# Patient Record
Sex: FEMALE | Race: WHITE | Employment: OTHER | RURAL
[De-identification: names, ages, dates, MRNs, and addresses within clinical notes are randomized per-mention and may not be internally consistent; named-entity substitution may affect disease eponyms.]

---

## 2017-01-06 ENCOUNTER — OFFICE VISIT (OUTPATIENT)
Dept: FAMILY MEDICINE CLINIC | Age: 82
End: 2017-01-06

## 2017-01-06 VITALS
BODY MASS INDEX: 21.38 KG/M2 | SYSTOLIC BLOOD PRESSURE: 143 MMHG | OXYGEN SATURATION: 96 % | DIASTOLIC BLOOD PRESSURE: 98 MMHG | HEIGHT: 66 IN | HEART RATE: 49 BPM | WEIGHT: 133 LBS

## 2017-01-06 DIAGNOSIS — I10 ESSENTIAL HYPERTENSION: ICD-10-CM

## 2017-01-06 DIAGNOSIS — E83.52 HYPERCALCEMIA: ICD-10-CM

## 2017-01-06 DIAGNOSIS — E05.20 TOXIC MULTINODULAR GOITER: Primary | ICD-10-CM

## 2017-01-06 DIAGNOSIS — E21.3 HYPERPARATHYROIDISM (HCC): ICD-10-CM

## 2017-01-06 PROBLEM — Z90.12 S/P LEFT MASTECTOMY: Status: ACTIVE | Noted: 2017-01-06

## 2017-01-06 RX ORDER — GLUCOSAMINE SULFATE 1500 MG
POWDER IN PACKET (EA) ORAL DAILY
COMMUNITY
End: 2017-05-12

## 2017-01-06 RX ORDER — DOCUSATE SODIUM 100 MG/1
100 CAPSULE, LIQUID FILLED ORAL
COMMUNITY
End: 2019-04-25 | Stop reason: ALTCHOICE

## 2017-01-06 RX ORDER — POTASSIUM CHLORIDE 750 MG/1
TABLET, FILM COATED, EXTENDED RELEASE ORAL
Qty: 180 TAB | Refills: 3 | Status: SHIPPED | OUTPATIENT
Start: 2017-01-06 | End: 2017-02-18 | Stop reason: SDUPTHER

## 2017-01-06 NOTE — PATIENT INSTRUCTIONS
Hyperthyroidism: Care Instructions  Your Care Instructions  Hyperthyroidism occurs when the thyroid gland makes too much thyroid hormone. This speeds up your metabolismhow your body uses energy. This condition can cause you to be very active, lose weight, and have sleep problems, eye problems, and a fast heart rate. It can also cause a goiter. A goiter is an enlarged thyroid gland that you can see at the front of the neck. Hyperthyroidism is often caused by Graves disease. In Graves' disease, the body's defense (immune) system attacks the thyroid gland. Your doctor may prescribe a beta-blocker medicine to slow your pulse and calm you down. But this is not a treatment for hyperthyroidism. It is given for your fast heart rate. Your doctor may also give you antithyroid medicine. This medicine keeps excess thyroid hormone in check. In some cases, doctors recommend radioactive iodine or surgery to remove the thyroid. After either of these treatments, you may need to take medicine to replace thyroid hormone for the rest of your life. Follow-up care is a key part of your treatment and safety. Be sure to make and go to all appointments, and call your doctor if you are having problems. Its also a good idea to know your test results and keep a list of the medicines you take. How can you care for yourself at home? · Take your medicines exactly as prescribed. You need to take the thyroid medicine at the same time each day. Call your doctor if you think you are having a problem with your medicine. · Graves' disease can make your eyes sore. Use artificial tears, eye drops, and sunglasses to protect your eyes from dryness, wind, and sun. Raise your head with pillows at night to prevent your eyes from swelling. In some cases, taping your eyelids shut at night will keep your eyes from being dry in the morning. · Make sure you get enough calcium.  Foods that are rich in calcium include milk, yogurt, cheese, and dark green vegetables. · If you need to gain weight, ask your doctor about special diets. · Do not eat kelp. Evelyne Barney is high in iodine, which can make hyperthyroidism worse. Evelyne Barney is commonly used in [employer] and other [place] foods. You can use iodized salt and eat bread and seafood. Try to eat a balanced diet. · Do not use caffeine and other stimulants. These can make symptoms worse, such as a fast heartbeat, nervousness, and problems focusing. · Do not smoke. Smoking can make your condition worse and may lead to more serious eye problems. If you need help quitting, talk to your doctor about stop-smoking programs and medicines. These can increase your chances of quitting for good. · Lower your stress. Learn to use biofeedback, guided imagery, meditation, or other methods to relax. · Use creams or ointments for irritated skin. Ask your doctor which type to use. · Tell all your doctors about your condition. They need to know because some medicines contain iodine. When should you call for help? Call your doctor now or seek immediate medical care if:  · You have symptoms of a sudden, very high thyroid level (thyroid storm). These include:  ¨ Being nauseated, vomiting, and having diarrhea. ¨ Sweating a lot. ¨ Feeling extremely restless and confused. ¨ Having a high fever. ¨ Having a fast heartbeat. · You have sudden vision changes or eye pain. · You have a fever or severe sore throat and are taking antithyroid medicines, such as PTU or methimazole. Watch closely for changes in your health, and be sure to contact your doctor if:  · You have a sore throat or have problems swallowing. · You have swollen, itchy, or red eyes or your other eye symptoms get worse, or you have new vision problems. · You have signs of a low thyroid level (hypothyroidism). You may feel very tired, confused, or weak. Where can you learn more? Go to http://frandy-josé miguel.info/.   Enter R657 in the search box to learn more about \"Hyperthyroidism: Care Instructions. \"  Current as of: July 28, 2016  Content Version: 11.1  © 8536-0885 [employer]. Care instructions adapted under license by [employer] (which disclaims liability or warranty for this information). If you have questions about a medical condition or this instruction, always ask your healthcare professional. [place] [address] any warranty or liability for your use of this information.

## 2017-01-06 NOTE — PROGRESS NOTES
Oakley Homans is a 80 y.o. female presenting for/with:    Hypertension    HPI  Grief reaction  Pt had recent loss of her  early Oct 2016 from a brief episode of cancer. Feelings of being down have been mild lately. Tremor has been improving. PHQ 2 / 9, over the last two weeks 1/6/2017   Little interest or pleasure in doing things Not at all   Feeling down, depressed or hopeless Not at all   Total Score PHQ 2 0   Trouble falling or staying asleep, or sleeping too much -   Feeling tired or having little energy -   Poor appetite or overeating -   Feeling bad about yourself - or that you are a failure or have let yourself or your family down -   Trouble concentrating on things such as school, work, reading or watching TV -   Moving or speaking so slowly that other people could have noticed; or the opposite being so fidgety that others notice -   Thoughts of being better off dead, or hurting yourself in some way -   PHQ 9 Score -     Pre-Diabetes. Sugars controlled well  Hypoglycemia: none  Tolerating current treatment well  Current medications include diet only. Never on meds for DM2. Lab Results   Component Value Date/Time    Hemoglobin A1c 5.7 04/08/2016 12:23 PM    Hemoglobin A1c 6.7 10/21/2015 10:15 AM    Hemoglobin A1c 6.6 06/04/2015 12:10 PM    Glucose 88 10/26/2016 12:13 PM    LDL, calculated 76 10/21/2015 10:15 AM    Creatinine 0.81 10/26/2016 12:13 PM     Lab Results   Component Value Date/Time    Microalbumin, urine 114.3 12/22/2015 12:21 PM     Follow- up for abn thyroid levels and abn PTH  Multinodular toxic goiter. Last labs show normal parathyroid but con't overactive thyroid. We added methimazole 5mg every day last visit and con't bisoprolol at 10mg every day. Still having some tremor. Less issues with  racing thoughts.     Lab Results   Component Value Date/Time    TSH 0.057 10/26/2016 12:13 PM    TSH 0.064 06/16/2016 12:57 PM    T3 Uptake 26 04/11/2016 05:42 PM    T4, Free 1.35 06/16/2016 12:57 PM    T4, Total 11.6 04/11/2016 05:42 PM     Lab Results   Component Value Date/Time    Calcium 11.4 10/26/2016 12:13 PM    PTH, Intact 36 10/26/2016 12:13 PM     Hypertension. Blood pressures have been stable. Management at last visit included con't bisoprolol 10mg qd. Current regimen: beta-blocker (due to hx of hyperthyroid). Also taking and magnesium, potassium. Prev on loop diuretic, has been held lately. Symptoms include no symptoms. Patient denies palpitations, edema. Lab review:   Lab Results   Component Value Date/Time    Sodium 142 10/26/2016 12:13 PM    Potassium 4.6 10/26/2016 12:13 PM    Chloride 102 10/26/2016 12:13 PM    CO2 24 10/26/2016 12:13 PM    Glucose 88 10/26/2016 12:13 PM    BUN 18 10/26/2016 12:13 PM    Creatinine 0.81 10/26/2016 12:13 PM    BUN/Creatinine ratio 22 10/26/2016 12:13 PM    GFR est AA 78 10/26/2016 12:13 PM    GFR est non-AA 67 10/26/2016 12:13 PM    Calcium 11.4 10/26/2016 12:13 PM     PMH, SH, Medications/Allergies: reviewed, on chart. Pt's  d/c from cancer late Oct 2016.     ROS:  Constitutional: No fever, chills or weight loss  Respiratory: No cough, SOB   CV: No recent chest pain, No Palpitations    Visit Vitals    BP (!) 143/98 (BP 1 Location: Right arm, BP Patient Position: At rest)    Pulse (!) 49    Ht 5' 6\" (1.676 m)    Wt 133 lb (60.3 kg)    SpO2 96%    BMI 21.47 kg/m2     Wt Readings from Last 3 Encounters:   01/06/17 133 lb (60.3 kg)   12/02/16 133 lb 3.2 oz (60.4 kg)   11/11/16 130 lb (59 kg)     BP Readings from Last 3 Encounters:   01/06/17 (!) 143/98   12/02/16 142/65   11/11/16 140/64     Physical Examination: General appearance - alert, well appearing, and in no distress  Mental status - alert, oriented to person, place, and time  Eyes - pupils equal and reactive, extraocular eye movements intact  ENT - bilateral external ears and nose normal. Normal lips  Neck - supple, no significant adenopathy, no thyromegaly or mass  Lymphatics - no palpable lymphadenopathy, no hepatosplenomegaly  Chest - clear to auscultation, no wheezes, rales or rhonchi, symmetric air entry  Heart - normal rate, regular rhythm, normal S1, S2, no murmurs, rubs, clicks or gallops  Extremities - peripheral pulses normal, no pedal edema, no clubbing or cyanosis    A/P:  Multinodular, mildly toxic goiter. Doing well on bisoprolol 10mg every day, DURANT 5mg every day. recheck labs today. Movement disorder/tremor  Better s/p starting DURANT. Probably due to uncontrolled hyperthyroid. Discussed thyroidectomy. PT is considering. Will think about surgical eval in next few visits. Grief reaction  Doing ok since last visit. Monitor. Osteoporosis. Fixing thyroid should help, but given fx risk, pt does meet criteria for 2y course of bisphosphonate or other agent. Plan DXA 2018    HTN  Decent control. con't current regimen. RF KCL 40meq /d. BrCA s/p L mastectomy  Mastectomy bra x2 and prosthesis ore  F/U 1mo or per labs.

## 2017-01-06 NOTE — MR AVS SNAPSHOT
Visit Information Date & Time Provider Department Dept. Phone Encounter #  
 1/6/2017 11:30 AM Hermelinda Kim, Jared Schultz [id number] Follow-up Instructions Return in about 4 weeks (around 2/3/2017). Upcoming Health Maintenance Date Due DTaP/Tdap/Td series (1 - Tdap) 6/2/2008 MEDICARE YEARLY EXAM 12/22/2016 GLAUCOMA SCREENING Q2Y 2/3/2018 Allergies as of 1/6/2017  Review Complete On: 1/6/2017 By: Hermelinda Kim MD  
  
 Severity Noted Reaction Type Reactions Contrast Agent [Iodine]  03/02/2015    Unknown (comments) Doxycycline  03/02/2015    Unknown (comments) Erythromycin  03/02/2015    Unknown (comments) Fosamax [Alendronate]  01/11/2016    Other (comments) Macrodantin [Nitrofurantoin Macrocrystalline]  03/02/2015    Unknown (comments) Sulfa (Sulfonamide Antibiotics)  03/02/2015    Hives Eyes turned red Current Immunizations  Reviewed on 10/26/2015 Name Date Influenza High Dose Vaccine PF 9/2/2016, 10/1/2015 Influenza Vaccine 10/17/2014 Pneumococcal Conjugate (PCV-13) 9/2/2016 Pneumococcal Vaccine (Unspecified Type) 10/1/2009 Td 6/1/2008 Zoster Vaccine, Live 6/1/2007 Not reviewed this visit You Were Diagnosed With   
  
 Codes Comments Toxic multinodular goiter    -  Primary ICD-10-CM: E05.20 ICD-9-CM: 242.20 Hyperparathyroidism ([facility]     ICD-10-CM: E21.3 ICD-9-CM: 252.00 Hypercalcemia     ICD-10-CM: T76.96 
ICD-9-CM: 275.42 Essential hypertension     ICD-10-CM: I10 
ICD-9-CM: 401.9 Vitals BP Pulse Height(growth percentile) Weight(growth percentile) SpO2 BMI  
 (!) 143/98 (BP 1 Location: Right arm, BP Patient Position: At rest) (!) 49 5' 6\" (1.676 m) 133 lb (60.3 kg) 96% 21.47 kg/m2 OB Status Smoking Status Hysterectomy Never Smoker BMI and BSA Data  Body Mass Index Body Surface Area  
 21.47 kg/m 2 1.68 m 2  
  
 Preferred Pharmacy Pharmacy Name Phone [facility] [facility] [address] Carry [phone] Your Updated Medication List  
  
   
This list is accurate as of: 1/6/17 12:45 PM.  Always use your most recent med list.  
  
  
  
  
 aspirin delayed-release 81 mg tablet Take  by mouth daily. bisoprolol 10 mg tablet Commonly known as:  Mary  Take 1 Tab by mouth daily. Indications: thyroid, pressure, heart  
  
 glucosamine-chondroitin 750-600 mg Tab Take  by mouth. methIMAzole 5 mg tablet Commonly known as:  TAPAZOLE  
TAKE 1 TABLET BY MOUTH DAILY  
  
 omeprazole 40 mg capsule Commonly known as:  PRILOSEC Take 1 Cap by mouth daily. potassium chloride SR 10 mEq tablet Commonly known as:  KLOR-CON 10  
TAKE 2 TABLETS BY MOUTH TWICE DAILY  
  
 STOOL SOFTENER 100 mg capsule Generic drug:  docusate sodium Take 100 mg by mouth two (2) times a day. VITAMIN B-12 1,000 mcg tablet Generic drug:  cyanocobalamin Take 1,000 mcg by mouth daily. VITAMIN D3 1,000 unit Cap Generic drug:  cholecalciferol Take  by mouth daily. Prescriptions Sent to Pharmacy Refills  
 potassium chloride SR (KLOR-CON 10) 10 mEq tablet 3 Sig: TAKE 2 TABLETS BY MOUTH TWICE DAILY Class: Normal  
 Pharmacy: [facility] [place] [address], [address] [place] [address] [address] Ph #: [phone] We Performed the Following CBC WITH AUTOMATED DIFF [39260 CPT(R)] COLLECTION VENOUS BLOOD,VENIPUNCTURE [id number] CPT(R)] METABOLIC PANEL, COMPREHENSIVE [08296 CPT(R)] TSH RFX ON ABNORMAL TO FREE T4 [[id number] Custom] Follow-up Instructions Return in about 4 weeks (around 2/3/2017). Patient Instructions Hyperthyroidism: Care Instructions Your Care Instructions Hyperthyroidism occurs when the thyroid gland makes too much thyroid hormone. This speeds up your metabolismhow your body uses energy. This condition can cause you to be very active, lose weight, and have sleep problems, eye problems, and a fast heart rate. It can also cause a goiter. A goiter is an enlarged thyroid gland that you can see at the front of the neck. Hyperthyroidism is often caused by Graves disease. In Graves' disease, the body's defense (immune) system attacks the thyroid gland. Your doctor may prescribe a beta-blocker medicine to slow your pulse and calm you down. But this is not a treatment for hyperthyroidism. It is given for your fast heart rate. Your doctor may also give you antithyroid medicine. This medicine keeps excess thyroid hormone in check. In some cases, doctors recommend radioactive iodine or surgery to remove the thyroid. After either of these treatments, you may need to take medicine to replace thyroid hormone for the rest of your life. Follow-up care is a key part of your treatment and safety. Be sure to make and go to all appointments, and call your doctor if you are having problems. Its also a good idea to know your test results and keep a list of the medicines you take. How can you care for yourself at home? · Take your medicines exactly as prescribed. You need to take the thyroid medicine at the same time each day. Call your doctor if you think you are having a problem with your medicine. · Graves' disease can make your eyes sore. Use artificial tears, eye drops, and sunglasses to protect your eyes from dryness, wind, and sun. Raise your head with pillows at night to prevent your eyes from swelling. In some cases, taping your eyelids shut at night will keep your eyes from being dry in the morning. · Make sure you get enough calcium. Foods that are rich in calcium include milk, yogurt, cheese, and dark green vegetables. · If you need to gain weight, ask your doctor about special diets. · Do not eat kelp. Mary Ellen Nolen is high in iodine, which can make hyperthyroidism worse. Mary Ellen Pinedas is commonly used in sushi and other [place] foods. You can use iodized salt and eat bread and seafood. Try to eat a balanced diet. · Do not use caffeine and other stimulants. These can make symptoms worse, such as a fast heartbeat, nervousness, and problems focusing. · Do not smoke. Smoking can make your condition worse and may lead to more serious eye problems. If you need help quitting, talk to your doctor about stop-smoking programs and medicines. These can increase your chances of quitting for good. · Lower your stress. Learn to use biofeedback, guided imagery, meditation, or other methods to relax. · Use creams or ointments for irritated skin. Ask your doctor which type to use. · Tell all your doctors about your condition. They need to know because some medicines contain iodine. When should you call for help? Call your doctor now or seek immediate medical care if: 
· You have symptoms of a sudden, very high thyroid level (thyroid storm). These include: ¨ Being nauseated, vomiting, and having diarrhea. ¨ Sweating a lot. ¨ Feeling extremely restless and confused. ¨ Having a high fever. ¨ Having a fast heartbeat. · You have sudden vision changes or eye pain. · You have a fever or severe sore throat and are taking antithyroid medicines, such as PTU or methimazole. Watch closely for changes in your health, and be sure to contact your doctor if: 
· You have a sore throat or have problems swallowing. · You have swollen, itchy, or red eyes or your other eye symptoms get worse, or you have new vision problems. · You have signs of a low thyroid level (hypothyroidism). You may feel very tired, confused, or weak. Where can you learn more? Go to http://frandy-josé miguel.info/. Enter A704 in the search box to learn more about \"Hyperthyroidism: Care Instructions. \" Current as of: July 28, 2016 Content Version: 11.1 © 1034-1202 [employer]. Care instructions adapted under license by [employer] (which disclaims liability or warranty for this information). If you have questions about a medical condition or this instruction, always ask your healthcare professional. [address] any warranty or liability for your use of this information. Introducing [facility]! Dear Maxwell Sibley: 
Thank you for requesting a [employer] account. Our records indicate that you already have an active [employer] account. You can access your account anytime at [email or web address] Did you know that you can access your hospital and ER discharge instructions at any time in [employer]? You can also review all of your test results from your hospital stay or ER visit. Additional Information If you have questions, please visit the Frequently Asked Questions section of the [employer] website at [email or web address] Remember, [employer] is NOT to be used for urgent needs. For medical emergencies, dial 911. Now available from your iPhone and Android! Please provide this summary of care documentation to your next provider. Your primary care clinician is listed as Laurian Goodell K. Bavuso. If you have any questions after today's visit, please call [phone].

## 2017-01-07 LAB
ALBUMIN SERPL-MCNC: 4.1 G/DL (ref 3.5–4.7)
ALBUMIN/GLOB SERPL: 1.5 {RATIO} (ref 1.1–2.5)
ALP SERPL-CCNC: 74 IU/L (ref 39–117)
ALT SERPL-CCNC: 8 IU/L (ref 0–32)
AST SERPL-CCNC: 14 IU/L (ref 0–40)
BASOPHILS # BLD AUTO: 0.1 X10E3/UL (ref 0–0.2)
BASOPHILS NFR BLD AUTO: 1 %
BILIRUB SERPL-MCNC: 1.4 MG/DL (ref 0–1.2)
BUN SERPL-MCNC: 22 MG/DL (ref 8–27)
BUN/CREAT SERPL: 23 (ref 11–26)
CALCIUM SERPL-MCNC: 11.5 MG/DL (ref 8.7–10.3)
CHLORIDE SERPL-SCNC: 104 MMOL/L (ref 96–106)
CO2 SERPL-SCNC: 26 MMOL/L (ref 18–29)
CREAT SERPL-MCNC: 0.94 MG/DL (ref 0.57–1)
EOSINOPHIL # BLD AUTO: 0.3 X10E3/UL (ref 0–0.4)
EOSINOPHIL NFR BLD AUTO: 6 %
ERYTHROCYTE [DISTWIDTH] IN BLOOD BY AUTOMATED COUNT: 14.4 % (ref 12.3–15.4)
GLOBULIN SER CALC-MCNC: 2.7 G/DL (ref 1.5–4.5)
GLUCOSE SERPL-MCNC: 104 MG/DL (ref 65–99)
HCT VFR BLD AUTO: 41.9 % (ref 34–46.6)
HGB BLD-MCNC: 14.1 G/DL (ref 11.1–15.9)
IMM GRANULOCYTES # BLD: 0.1 X10E3/UL (ref 0–0.1)
IMM GRANULOCYTES NFR BLD: 1 %
LYMPHOCYTES # BLD AUTO: 1.8 X10E3/UL (ref 0.7–3.1)
LYMPHOCYTES NFR BLD AUTO: 40 %
MCH RBC QN AUTO: 28.7 PG (ref 26.6–33)
MCHC RBC AUTO-ENTMCNC: 33.7 G/DL (ref 31.5–35.7)
MCV RBC AUTO: 85 FL (ref 79–97)
MONOCYTES # BLD AUTO: 0.4 X10E3/UL (ref 0.1–0.9)
MONOCYTES NFR BLD AUTO: 9 %
NEUTROPHILS # BLD AUTO: 2 X10E3/UL (ref 1.4–7)
NEUTROPHILS NFR BLD AUTO: 43 %
PLATELET # BLD AUTO: 244 X10E3/UL (ref 150–379)
POTASSIUM SERPL-SCNC: 4.9 MMOL/L (ref 3.5–5.2)
PROT SERPL-MCNC: 6.8 G/DL (ref 6–8.5)
RBC # BLD AUTO: 4.91 X10E6/UL (ref 3.77–5.28)
SODIUM SERPL-SCNC: 143 MMOL/L (ref 134–144)
TSH SERPL DL<=0.005 MIU/L-ACNC: 0.68 UIU/ML (ref 0.45–4.5)
WBC # BLD AUTO: 4.5 X10E3/UL (ref 3.4–10.8)

## 2017-01-16 ENCOUNTER — TELEPHONE (OUTPATIENT)
Dept: FAMILY MEDICINE CLINIC | Age: 82
End: 2017-01-16

## 2017-01-16 NOTE — TELEPHONE ENCOUNTER
Pt called having problems with her eye (watery and red) after starting tapazole.  advised pt to stop and bring it in on the next to discuss with the

## 2017-01-16 NOTE — TELEPHONE ENCOUNTER
Leanne would like to talk to Sigrid Laboy in regards to an eye problem she thinks is related to a new medication. Please give her a call back. Thank you.

## 2017-02-03 ENCOUNTER — OFFICE VISIT (OUTPATIENT)
Dept: FAMILY MEDICINE CLINIC | Age: 82
End: 2017-02-03

## 2017-02-03 VITALS
WEIGHT: 135 LBS | HEART RATE: 73 BPM | BODY MASS INDEX: 21.69 KG/M2 | DIASTOLIC BLOOD PRESSURE: 78 MMHG | TEMPERATURE: 97.8 F | HEIGHT: 66 IN | OXYGEN SATURATION: 98 % | SYSTOLIC BLOOD PRESSURE: 162 MMHG | RESPIRATION RATE: 16 BRPM

## 2017-02-03 DIAGNOSIS — C50.911 BILATERAL MALIGNANT NEOPLASM OF BREAST IN FEMALE, UNSPECIFIED SITE OF BREAST: ICD-10-CM

## 2017-02-03 DIAGNOSIS — Z13.31 SCREENING FOR DEPRESSION: ICD-10-CM

## 2017-02-03 DIAGNOSIS — Z13.39 SCREENING FOR ALCOHOLISM: ICD-10-CM

## 2017-02-03 DIAGNOSIS — I10 ESSENTIAL HYPERTENSION: ICD-10-CM

## 2017-02-03 DIAGNOSIS — Z79.899 ENCOUNTER FOR LONG-TERM (CURRENT) DRUG USE: ICD-10-CM

## 2017-02-03 DIAGNOSIS — Z00.00 ROUTINE GENERAL MEDICAL EXAMINATION AT A HEALTH CARE FACILITY: Primary | ICD-10-CM

## 2017-02-03 DIAGNOSIS — C50.912 BILATERAL MALIGNANT NEOPLASM OF BREAST IN FEMALE, UNSPECIFIED SITE OF BREAST: ICD-10-CM

## 2017-02-03 DIAGNOSIS — E05.20 TOXIC MULTINODULAR GOITER: ICD-10-CM

## 2017-02-03 NOTE — PROGRESS NOTES
Leanne Bhatt is a 80 y.o. female presenting for/with: Annual Wellness Visit and Advance Care Planning    HPI  Grief reaction  Pt had recent loss of her  early Oct 2016 from a brief episode of cancer. Feelings of being down have been mild lately. Tremor has been improving. PHQ 2 / 9, over the last two weeks 2/3/2017   Little interest or pleasure in doing things Not at all   Feeling down, depressed or hopeless Not at all   Total Score PHQ 2 0   Trouble falling or staying asleep, or sleeping too much -   Feeling tired or having little energy -   Poor appetite or overeating -   Feeling bad about yourself - or that you are a failure or have let yourself or your family down -   Trouble concentrating on things such as school, work, reading or watching TV -   Moving or speaking so slowly that other people could have noticed; or the opposite being so fidgety that others notice -   Thoughts of being better off dead, or hurting yourself in some way -   PHQ 9 Score -     Pre-Diabetes. Sugars controlled well  Hypoglycemia: none  Tolerating current treatment well  Current medications include diet only. Never on meds for DM2. Lab Results   Component Value Date/Time    Hemoglobin A1c 5.7 04/08/2016 12:23 PM    Hemoglobin A1c 6.7 10/21/2015 10:15 AM    Hemoglobin A1c 6.6 06/04/2015 12:10 PM    Glucose 104 01/06/2017 12:42 PM    LDL, calculated 76 10/21/2015 10:15 AM    Creatinine 0.94 01/06/2017 12:42 PM     Lab Results   Component Value Date/Time    Microalbumin, urine 114.3 12/22/2015 12:21 PM     Follow- up for abn thyroid levels and abn PTH  Multinodular toxic goiter. Last labs show normal parathyroid and improved overactive thyroid. We con't methimazole 5mg daily last visit, but pt noticed some itching rash 2wk ago and we d/c that. Sx resolved after d/c med. Con't bisoprolol at 10mg every day. Still having some tremor. Less issues with  racing thoughts.     Lab Results   Component Value Date/Time    TSH 0.684 01/06/2017 12:42 PM    TSH 0.064 06/16/2016 12:57 PM    T3 Uptake 26 04/11/2016 05:42 PM    T4, Free 1.35 06/16/2016 12:57 PM    T4, Total 11.6 04/11/2016 05:42 PM     Lab Results   Component Value Date/Time    Calcium 11.5 01/06/2017 12:42 PM    PTH, Intact 36 10/26/2016 12:13 PM     Hypertension. Blood pressures have been stable. Management at last visit included con't bisoprolol 10mg qd. Current regimen: beta-blocker (due to hx of hyperthyroid). Also taking and magnesium, potassium. Prev on loop diuretic, has been held lately. Symptoms include no symptoms. Patient denies palpitations, edema. Lab review:   Lab Results   Component Value Date/Time    Sodium 143 01/06/2017 12:42 PM    Potassium 4.9 01/06/2017 12:42 PM    Chloride 104 01/06/2017 12:42 PM    CO2 26 01/06/2017 12:42 PM    Glucose 104 01/06/2017 12:42 PM    BUN 22 01/06/2017 12:42 PM    Creatinine 0.94 01/06/2017 12:42 PM    BUN/Creatinine ratio 23 01/06/2017 12:42 PM    GFR est AA 65 01/06/2017 12:42 PM    GFR est non-AA 56 01/06/2017 12:42 PM    Calcium 11.5 01/06/2017 12:42 PM     PMH, SH, Medications/Allergies: reviewed, on chart. Pt's  d/c from cancer late Oct 2016.     ROS:  Constitutional: No fever, chills or weight loss  Respiratory: No cough, SOB   CV: No recent chest pain, No Palpitations    Visit Vitals    /78    Pulse 73    Temp 97.8 °F (36.6 °C) (Oral)    Resp 16    Ht 5' 6\" (1.676 m)    Wt 135 lb (61.2 kg)    SpO2 98%    BMI 21.79 kg/m2     Wt Readings from Last 3 Encounters:   02/03/17 135 lb (61.2 kg)   01/06/17 133 lb (60.3 kg)   12/02/16 133 lb 3.2 oz (60.4 kg)     BP Readings from Last 3 Encounters:   02/03/17 162/78   01/06/17 (!) 143/98   12/02/16 142/65     Physical Examination: General appearance - alert, well appearing, and in no distress  Mental status - alert, oriented to person, place, and time  Eyes - pupils equal and reactive, extraocular eye movements intact  ENT - bilateral external ears and nose normal. Normal lips  Neck - supple, no significant adenopathy, no thyromegaly or mass  Lymphatics - no palpable lymphadenopathy, no hepatosplenomegaly  Chest - clear to auscultation, no wheezes, rales or rhonchi, symmetric air entry  Heart - normal rate, regular rhythm, normal S1, S2, no murmurs, rubs, clicks or gallops  Extremities - peripheral pulses normal, no pedal edema, no clubbing or cyanosis    A/P:  Multinodular, mildly toxic goiter. Doing well on bisoprolol 10mg every day, but had rash with DURANT. Again discussed thyroidectomy. PT is considering. Will think about surgical eval in next few visits. Discussed PTU tx. Movement disorder/tremor  Better lately. Monitor. Grief reaction  Doing ok since last visit. Monitor. Osteoporosis. Fixing thyroid should help, but given fx risk, pt does meet criteria for other agent. PT does have breast ca hx, so evista may be a good choice. Pt will consider. Plan DXA 2018    HTN  Up today. Prev ok, ? 2nd to d/c DURANT. Trouble swallowing the KCl. Take sip water prior to taking pill, but if not doing well on that, consider change to powder. F/U 2mo.    ______________________________________________________________________    Lv Acuna is a 80 y.o. female and presents for annual Medicare Wellness Visit. Problem List: Reviewed with patient and discussed risk factors.     Patient Active Problem List   Diagnosis Code    Constipation K59.00    Breast cancer ([facility] C50.919    Hypertension I10    Hypopotassemia E87.6    GERD (gastroesophageal reflux disease) K21.9    Skin cancer of nose C43.1    Encounter for long-term (current) drug use Z79.899    Hypercalcemia E83.52    Hyperparathyroidism ([facility] E21.3    Osteoporosis M81.0    Toxic multinodular goiter E05.20    S/P left mastectomy Z90.12       Current medical providers:  Patient Care Team:  Tae Albert MD as PCP - General (Family Practice)    PMH, SH, Medications/Allergies: reviewed, on chart.    Female Alcohol Screening: On any occasion during the past 3 months, have you had more than 3 drinks containing alcohol? No    Do you average more than 7 drinks per week? No    ROS:  Constitutional: No fever, chills or weight loss  Respiratory: No cough, SOB   CV: No chest pain or Palpitations    Objective:  Visit Vitals    /78    Pulse 73    Temp 97.8 °F (36.6 °C) (Oral)    Resp 16    Ht 5' 6\" (1.676 m)    Wt 135 lb (61.2 kg)    SpO2 98%    BMI 21.79 kg/m2    Body mass index is 21.79 kg/(m^2). Assessment of cognitive impairment: Alert and oriented x 3    Depression Screen:   PHQ 2 / 9, over the last two weeks 2/3/2017   Little interest or pleasure in doing things Not at all   Feeling down, depressed or hopeless Not at all   Total Score PHQ 2 0   Trouble falling or staying asleep, or sleeping too much -   Feeling tired or having little energy -   Poor appetite or overeating -   Feeling bad about yourself - or that you are a failure or have let yourself or your family down -   Trouble concentrating on things such as school, work, reading or watching TV -   Moving or speaking so slowly that other people could have noticed; or the opposite being so fidgety that others notice -   Thoughts of being better off dead, or hurting yourself in some way -   PHQ 9 Score -       Fall Risk Assessment:    Fall Risk Assessment, last 12 mths 2/3/2017   Able to walk? Yes   Fall in past 12 months? No       Functional Ability:   Does the patient exhibit a steady gait? yes   How long did it take the patient to get up and walk from a sitting position? 3s   Is the patient self reliant?  (ie can do own laundry, meals, household chores)  yes     Does the patient handle his/her own medications? yes     Does the patient handle his/her own money? yes     Is the patients home safe (ie good lighting, handrails on stairs and bath, etc.)? yes     Did you notice or did patient express any hearing difficulties? no     Did you notice or did patient express any vision difficulties? no       Advance Care Planning:   Patient was offered the opportunity to discuss advance care planning:  yes     Does patient have an Advance Directive:  no   If no, did you provide information on Caring Connections? yes       Plan:      Health Maintenance   Topic Date Due    DTaP/Tdap/Td series (1 - Tdap) 06/02/2008    MEDICARE YEARLY EXAM  12/22/2016    GLAUCOMA SCREENING Q2Y  02/03/2018    OSTEOPOROSIS SCREENING (DEXA)  Completed    ZOSTER VACCINE AGE 60>  Completed    Pneumococcal 65+ High/Highest Risk  Completed    INFLUENZA AGE 9 TO ADULT  Completed       *Patient verbalized understanding and agreement with the plan. A copy of the After Visit Summary with personalized health plan was given to the patient today.

## 2017-02-03 NOTE — MR AVS SNAPSHOT
Visit Information Date & Time Provider Department Dept. Phone Encounter #  
 2/3/2017 11:30 AM Corinne Bugler, [address] [id number] Follow-up Instructions Return in about 2 months (around 4/3/2017). Follow-up and Disposition History Upcoming Health Maintenance Date Due DTaP/Tdap/Td series (1 - Tdap) 6/2/2008 MEDICARE YEARLY EXAM 12/22/2016 GLAUCOMA SCREENING Q2Y 2/3/2018 Allergies as of 2/3/2017  Review Complete On: 2/3/2017 By: Corinne Bugler, MD  
  
 Severity Noted Reaction Type Reactions Contrast Agent [Iodine]  03/02/2015    Unknown (comments) Doxycycline  03/02/2015    Unknown (comments) Erythromycin  03/02/2015    Unknown (comments) Fosamax [Alendronate]  01/11/2016    Other (comments) Macrodantin [Nitrofurantoin Macrocrystalline]  03/02/2015    Unknown (comments) Sulfa (Sulfonamide Antibiotics)  03/02/2015    Hives Eyes turned red Current Immunizations  Reviewed on 10/26/2015 Name Date Influenza High Dose Vaccine PF 9/2/2016, 10/1/2015 Influenza Vaccine 10/17/2014 Pneumococcal Conjugate (PCV-13) 9/2/2016 Pneumococcal Vaccine (Unspecified Type) 10/1/2009 Td 6/1/2008 Zoster Vaccine, Live 6/1/2007 Not reviewed this visit You Were Diagnosed With   
  
 Codes Comments Routine general medical examination at a health care facility    -  Primary ICD-10-CM: Z00.00 ICD-9-CM: V70.0 Screening for alcoholism     ICD-10-CM: Z13.89 ICD-9-CM: V79.1 Screening for depression     ICD-10-CM: Z13.89 ICD-9-CM: V79.0 Bilateral malignant neoplasm of breast in female, unspecified site of [facility]     ICD-10-CM: C50.911, R85.077 ICD-9-CM: 174.9 Encounter for long-term (current) drug use     ICD-10-CM: Z79.899 ICD-9-CM: V58.69 Essential hypertension     ICD-10-CM: I10 
ICD-9-CM: 401.9 Toxic multinodular goiter     ICD-10-CM: E05.20 ICD-9-CM: 242.20 Vitals BP Pulse Temp Resp Height(growth percentile) Weight(growth percentile) 162/78 73 97.8 °F (36.6 °C) (Oral) 16 5' 6\" (1.676 m) 135 lb (61.2 kg) SpO2 BMI OB Status Smoking Status 98% 21.79 kg/m2 Hysterectomy Never Smoker BMI and BSA Data Body Mass Index Body Surface Area 21.79 kg/m 2 1.69 m 2 Preferred Pharmacy Pharmacy Name Phone [facility] [facility] AT [facility]  [address] Dago Saunders [phone] Your Updated Medication List  
  
   
This list is accurate as of: 2/3/17  1:01 PM.  Always use your most recent med list.  
  
  
  
  
 aspirin delayed-release 81 mg tablet Take  by mouth daily. bisoprolol 10 mg tablet Commonly known as:  Mayelin Everton Take 1 Tab by mouth daily. Indications: thyroid, pressure, heart  
  
 glucosamine-chondroitin 750-600 mg Tab Take  by mouth. omeprazole 40 mg capsule Commonly known as:  PRILOSEC Take 1 Cap by mouth daily. potassium chloride SR 10 mEq tablet Commonly known as:  KLOR-CON 10  
TAKE 2 TABLETS BY MOUTH TWICE DAILY  
  
 STOOL SOFTENER 100 mg capsule Generic drug:  docusate sodium Take 100 mg by mouth two (2) times a day. VITAMIN B-12 1,000 mcg tablet Generic drug:  cyanocobalamin Take 1,000 mcg by mouth daily. VITAMIN D3 1,000 unit Cap Generic drug:  cholecalciferol Take  by mouth daily. We Performed the Following [place] [id number] [[id number] [facility]] Follow-up Instructions Return in about 2 months (around 4/3/2017). Introducing [facility]! Dear Lesly Benavides: 
Thank you for requesting a BESOS account. Our records indicate that you already have an active BESOS account. You can access your account anytime at [email or web address] Did you know that you can access your hospital and ER discharge instructions at any time in [employer]? You can also review all of your test results from your hospital stay or ER visit. Additional Information If you have questions, please visit the Frequently Asked Questions section of the Event[employer] website at [email or web address]. [email or web address][email or web address] Remember, Event[employer]q is NOT to be used for urgent needs. For medical emergencies, dial 911. Now available from your iPhone and Android! Please provide this summary of care documentation to your next provider. Your primary care clinician is listed as Keenan Barnett. If you have any questions after today's visit, please call [phone].

## 2017-03-28 ENCOUNTER — TELEPHONE (OUTPATIENT)
Dept: FAMILY MEDICINE CLINIC | Age: 82
End: 2017-03-28

## 2017-03-28 NOTE — TELEPHONE ENCOUNTER
Baby Hidden,    Could you please call Leanne back at [phone] with an Endocrinologist recommendation. Thank you.

## 2017-04-07 ENCOUNTER — OFFICE VISIT (OUTPATIENT)
Dept: FAMILY MEDICINE CLINIC | Age: 82
End: 2017-04-07

## 2017-04-07 VITALS
WEIGHT: 137 LBS | HEIGHT: 66 IN | OXYGEN SATURATION: 98 % | HEART RATE: 80 BPM | BODY MASS INDEX: 22.02 KG/M2 | RESPIRATION RATE: 16 BRPM | SYSTOLIC BLOOD PRESSURE: 155 MMHG | DIASTOLIC BLOOD PRESSURE: 94 MMHG | TEMPERATURE: 96.6 F

## 2017-04-07 DIAGNOSIS — E05.20 TOXIC MULTINODULAR GOITER: Primary | ICD-10-CM

## 2017-04-07 DIAGNOSIS — I10 ESSENTIAL HYPERTENSION: ICD-10-CM

## 2017-04-07 NOTE — PROGRESS NOTES
China Colindres is a 80 y.o. female presenting for/with:    Hypertension and Hand Pain (thumb on right hand)    HPI    Pre-Diabetes. Sugars controlled well  Hypoglycemia: none  Tolerating current treatment well  Current medications include diet only. Never on meds for DM2. Lab Results   Component Value Date/Time    Hemoglobin A1c 5.7 04/08/2016 12:23 PM    Hemoglobin A1c 6.7 10/21/2015 10:15 AM    Hemoglobin A1c 6.6 06/04/2015 12:10 PM    Glucose 104 01/06/2017 12:42 PM    LDL, calculated 76 10/21/2015 10:15 AM    Creatinine 0.94 01/06/2017 12:42 PM     Lab Results   Component Value Date/Time    Microalbumin, urine 114.3 12/22/2015 12:21 PM     Follow- up for abn thyroid levels and abn PTH  Multinodular toxic goiter. Last labs show normal parathyroid and improved overactive thyroid. We con't to hold methimazole last visit due to hx itching rash. Con't bisoprolol at 10mg every day. Still having some tremor. Less issues with  racing thoughts. Lab Results   Component Value Date/Time    TSH 0.684 01/06/2017 12:42 PM    TSH 0.064 06/16/2016 12:57 PM    T3 Uptake 26 04/11/2016 05:42 PM    T4, Free 1.35 06/16/2016 12:57 PM    T4, Total 11.6 04/11/2016 05:42 PM     Lab Results   Component Value Date/Time    Calcium 11.5 01/06/2017 12:42 PM    PTH, Intact 36 10/26/2016 12:13 PM     Hypertension. Blood pressures have been stable. Management at last visit included con't bisoprolol 10mg qd. Current regimen: beta-blocker (due to hx of hyperthyroid). Also taking and magnesium, potassium. Prev on loop diuretic, has been held lately. Symptoms include no symptoms. Patient denies palpitations, edema.   Lab review:   Lab Results   Component Value Date/Time    Sodium 143 01/06/2017 12:42 PM    Potassium 4.9 01/06/2017 12:42 PM    Chloride 104 01/06/2017 12:42 PM    CO2 26 01/06/2017 12:42 PM    Glucose 104 01/06/2017 12:42 PM    BUN 22 01/06/2017 12:42 PM    Creatinine 0.94 01/06/2017 12:42 PM    BUN/Creatinine ratio 23 01/06/2017 12:42 PM    GFR est AA 65 01/06/2017 12:42 PM    GFR est non-AA 56 01/06/2017 12:42 PM    Calcium 11.5 01/06/2017 12:42 PM     PMH, SH, Medications/Allergies: reviewed, on chart. Pt's  d/c from cancer late Oct 2016. ROS:  Constitutional: No fever, chills or weight loss  Respiratory: No cough, SOB   CV: No recent chest pain, No Palpitations    Visit Vitals    BP (!) 155/94 (BP 1 Location: Right arm, BP Patient Position: Sitting)    Pulse 80    Temp 96.6 °F (35.9 °C) (Oral)    Resp 16    Ht 5' 6\" (1.676 m)    Wt 137 lb (62.1 kg)    SpO2 98%    BMI 22.11 kg/m2     Wt Readings from Last 3 Encounters:   04/07/17 137 lb (62.1 kg)   02/03/17 135 lb (61.2 kg)   01/06/17 133 lb (60.3 kg)     BP Readings from Last 3 Encounters:   04/07/17 (!) 155/94   02/03/17 162/78   01/06/17 (!) 143/98     Physical Examination: General appearance - alert, well appearing, and in no distress  Mental status - alert, oriented to person, place, and time  Eyes - pupils equal and reactive, extraocular eye movements intact  ENT - bilateral external ears and nose normal. Normal lips  Neck - supple, no significant adenopathy, no palpable thyromegaly. Lymphatics - no palpable lymphadenopathy, no hepatosplenomegaly  Chest - clear to auscultation, no wheezes, rales or rhonchi, symmetric air entry  Heart - normal rate, regular rhythm, normal S1, S2, no murmurs, rubs, clicks or gallops  Extremities - peripheral pulses normal, no pedal edema, no clubbing or cyanosis    A/P:  Multinodular, mildly toxic goiter. Doing well on bisoprolol 10mg every day, but had rash with DURANT. Again discussed thyroidectomy. Ready for referral. Sent to Dr. Annette Monk. Movement disorder/tremor  Better lately. Monitor. Grief reaction  Doing ok since last visit. Monitor. Osteoporosis. Fixing thyroid should help, but given fx risk, pt does meet criteria for other agent. PT does have breast ca hx, so evista may be a good choice.  Pt will consider. Plan DXA 2018    HTN  Better. [id number] Fatmata Dave for now. con't current regimen. F/U post spec eval or in 6mo.

## 2017-04-07 NOTE — MR AVS SNAPSHOT
Visit Information Date & Time Provider Department Dept. Phone Encounter #  
 4/7/2017 11:30 AM Liliana Denton, Jared Schultz [id number] Follow-up Instructions Return in about 6 months (around 10/7/2017), or if symptoms worsen or fail to improve. Follow-up and Disposition History Upcoming Health Maintenance Date Due DTaP/Tdap/Td series (1 - Tdap) 6/2/2008 GLAUCOMA SCREENING Q2Y 2/3/2018 MEDICARE YEARLY EXAM 2/4/2018 Allergies as of 4/7/2017  Review Complete On: 4/7/2017 By: Liliana Denton MD  
  
 Severity Noted Reaction Type Reactions Contrast Agent [Iodine]  03/02/2015    Unknown (comments) Doxycycline  03/02/2015    Unknown (comments) Erythromycin  03/02/2015    Unknown (comments) Fosamax [Alendronate]  01/11/2016    Other (comments) Patient stated she is not allergic to this medication. Macrodantin [Nitrofurantoin Macrocrystalline]  03/02/2015    Unknown (comments) Sulfa (Sulfonamide Antibiotics)  03/02/2015    Hives Eyes turned red Tamoxifen  04/07/2017    Other (comments) Current Immunizations  Reviewed on 10/26/2015 Name Date Influenza High Dose Vaccine PF 9/2/2016, 10/1/2015 Influenza Vaccine 10/17/2014 Pneumococcal Conjugate (PCV-13) 9/2/2016 Pneumococcal Vaccine (Unspecified Type) 10/1/2009 Td 6/1/2008 Zoster Vaccine, Live 6/1/2007 Not reviewed this visit You Were Diagnosed With   
  
 Codes Comments Toxic multinodular goiter    -  Primary ICD-10-CM: E05.20 ICD-9-CM: 242.20 Essential hypertension     ICD-10-CM: I10 
ICD-9-CM: 401.9 Vitals BP Pulse Temp Resp Height(growth percentile) Weight(growth percentile) (!) 155/94 (BP 1 Location: Right arm, BP Patient Position: Sitting) 80 96.6 °F (35.9 °C) (Oral) 16 5' 6\" (1.676 m) 137 lb (62.1 kg) SpO2 BMI OB Status Smoking Status 98% 22.11 kg/m2 Hysterectomy Never Smoker BMI and BSA Data Body Mass Index Body Surface Area  
 22.11 kg/m 2 1.7 m 2 Preferred Pharmacy Pharmacy Name Phone [facility], [address] AT [facility] [address] [phone] Your Updated Medication List  
  
   
This list is accurate as of: 4/7/17 12:45 PM.  Always use your most recent med list.  
  
  
  
  
 aspirin delayed-release 81 mg tablet Take  by mouth daily. bisoprolol 10 mg tablet Commonly known as:  Mindy Mighty Take 1 Tab by mouth daily. Indications: thyroid, pressure, heart  
  
 glucosamine-chondroitin 750-600 mg Tab Take  by mouth. omeprazole 40 mg capsule Commonly known as:  PRILOSEC Take 1 Cap by mouth daily. potassium chloride SR 10 mEq tablet Commonly known as:  KLOR-CON 10 Take 2 Tabs by mouth daily. Indications: pressure, new lower dose STOOL SOFTENER 100 mg capsule Generic drug:  docusate sodium Take 100 mg by mouth two (2) times a day. VITAMIN B-12 1,000 mcg tablet Generic drug:  cyanocobalamin Take 1,000 mcg by mouth daily. VITAMIN D3 1,000 unit Cap Generic drug:  cholecalciferol Take  by mouth daily. We Performed the Following REFERRAL TO ENT-OTOLARYNGOLOGY [XYL33 Custom] Comments:  
 Please evaluate patient for toxic multinodular goiter, probably needs resection. Imaging at hospitals. Follow-up Instructions Return in about 6 months (around 10/7/2017), or if symptoms worsen or fail to improve. Referral Information Referral ID Referred By Referred To  
  
 [id number] [facility] [address], [address] [facility] Visits Status Start Date End Date 1 New Request 4/7/17 4/7/18 If your referral has a status of pending review or denied, additional information will be sent to support the outcome of this decision. Patient Instructions Donnie Setting Disease: Exercises Your Care Instructions Here are some examples of typical rehabilitation exercises for your condition. Start each exercise slowly. Ease off the exercise if you start to have pain. Your doctor or your physical or occupational therapist will tell you when you can start these exercises and which ones will work best for you. How to do the exercises Thumb lifts 1. Place your hand on a flat surface, with your palm up. 2. Lift your thumb away from your palm to make a \"C\" shape. 3. Hold for about 6 seconds. 4. Repeat 8 to 12 times. Passive thumb MP flexion 1. Hold your hand in front of you, and turn your hand so your little finger faces down and your thumb faces up. (Your hand should be in the position used for shaking someone's hand.) You may also rest your hand on a flat surface. 2. Use the fingers on your other hand to bend your thumb down at the point where your thumb connects to your palm. 3. Hold for at least 15 to 30 seconds. 4. Repeat 2 to 4 times. Finkelstein stretch 1. Hold your arms out in front of you. (Your hand should be in the position used for shaking someone's hand.) 2. Bend your thumb toward your palm. 3. Use your other hand to gently stretch your thumb and wrist downward until you feel the stretch on the thumb side of your wrist. 
4. Hold for at least 15 to 30 seconds. 5. Repeat 2 to 4 times. Resisted ulnar deviation Note: For this exercise, you will need elastic exercise material, such as surgical tubing or Thera-Band. 1. Sit leaning forward with your legs slightly spread and your elbow on your thigh. 2. Grasp one end of the band with your palm down, and step on the other end with the foot opposite the hand holding the band. 3. Slowly bend your wrist sideways and away from your knee. 4. Repeat 8 to 12 times. Follow-up care is a key part of your treatment and safety.  Be sure to make and go to all appointments, and call your doctor if you are having problems. It's also a good idea to know your test results and keep a list of the medicines you take. Where can you learn more? Go to http://frandy-josé miguel.info/. Enter Y557 in the search box to learn more about \"De Quervain's Disease: Exercises. \" Current as of: May 23, 2016 Content Version: 11.2 © 7813-8820 Advizzer. Care instructions adapted under license by [employer] (which disclaims liability or warranty for this information). If you have questions about a medical condition or this instruction, always ask your healthcare professional. [address] any warranty or liability for your use of this information. If you have any questions regarding LookAcross, you may call LookAcross support at [phone]. Patient Instructions History Introducing [facility]! Dear Janeth Bonilla: 
Thank you for requesting a Messagemind account. Our records indicate that you already have an active Messagemind account. You can access your account anytime at [email or web address] Did you know that you can access your hospital and ER discharge instructions at any time in Messagemind? You can also review all of your test results from your hospital stay or ER visit. Additional Information If you have questions, please visit the Frequently Asked Questions section of the Messagemind website at [email or web address] Remember, Messagemind is NOT to be used for urgent needs. For medical emergencies, dial 911. Now available from your iPhone and Android! Please provide this summary of care documentation to your next provider. Your primary care clinician is listed as Po Barnett. If you have any questions after today's visit, please call [phone].

## 2017-04-07 NOTE — PATIENT INSTRUCTIONS
Meaghan No Disease: Exercises  Your Care Instructions  Here are some examples of typical rehabilitation exercises for your condition. Start each exercise slowly. Ease off the exercise if you start to have pain. Your doctor or your physical or occupational therapist will tell you when you can start these exercises and which ones will work best for you. How to do the exercises  Thumb lifts    1. Place your hand on a flat surface, with your palm up. 2. Lift your thumb away from your palm to make a \"C\" shape. 3. Hold for about 6 seconds. 4. Repeat 8 to 12 times. Passive thumb MP flexion    1. Hold your hand in front of you, and turn your hand so your little finger faces down and your thumb faces up. (Your hand should be in the position used for shaking someone's hand.) You may also rest your hand on a flat surface. 2. Use the fingers on your other hand to bend your thumb down at the point where your thumb connects to your palm. 3. Hold for at least 15 to 30 seconds. 4. Repeat 2 to 4 times. Finkelstein stretch    1. Hold your arms out in front of you. (Your hand should be in the position used for shaking someone's hand.)  2. Bend your thumb toward your palm. 3. Use your other hand to gently stretch your thumb and wrist downward until you feel the stretch on the thumb side of your wrist.  4. Hold for at least 15 to 30 seconds. 5. Repeat 2 to 4 times. Resisted ulnar deviation    Note: For this exercise, you will need elastic exercise material, such as surgical tubing or Thera-Band. 1. Sit leaning forward with your legs slightly spread and your elbow on your thigh. 2. Grasp one end of the band with your palm down, and step on the other end with the foot opposite the hand holding the band. 3. Slowly bend your wrist sideways and away from your knee. 4. Repeat 8 to 12 times. Follow-up care is a key part of your treatment and safety.  Be sure to make and go to all appointments, and call your doctor if you are having problems. It's also a good idea to know your test results and keep a list of the medicines you take. Where can you learn more? Go to http://frandy-josé miguel.info/. Enter N986 in the search box to learn more about \"De Quervain's Disease: Exercises. \"  Current as of: May 23, 2016  Content Version: 11.2  © 1371-1254 ISC8. Care instructions adapted under license by [employer] (which disclaims liability or warranty for this information). If you have questions about a medical condition or this instruction, always ask your healthcare professional. [address] any warranty or liability for your use of this information. If you have any questions regarding Mindmancert, you may call SnapMyAd support at [phone].

## 2017-04-14 ENCOUNTER — OFFICE VISIT (OUTPATIENT)
Dept: FAMILY MEDICINE CLINIC | Age: 82
End: 2017-04-14

## 2017-04-14 VITALS
TEMPERATURE: 96.5 F | RESPIRATION RATE: 19 BRPM | OXYGEN SATURATION: 98 % | DIASTOLIC BLOOD PRESSURE: 76 MMHG | HEART RATE: 61 BPM | SYSTOLIC BLOOD PRESSURE: 138 MMHG

## 2017-04-14 DIAGNOSIS — G25.5 CHOREA: ICD-10-CM

## 2017-04-14 DIAGNOSIS — E05.20 TOXIC MULTINODULAR GOITER: Primary | ICD-10-CM

## 2017-04-14 DIAGNOSIS — I10 ESSENTIAL HYPERTENSION: ICD-10-CM

## 2017-04-14 RX ORDER — QUETIAPINE FUMARATE 25 MG/1
25 TABLET, FILM COATED ORAL
Qty: 30 TAB | Refills: 3 | Status: SHIPPED | OUTPATIENT
Start: 2017-04-14 | End: 2017-05-12

## 2017-04-14 NOTE — PROGRESS NOTES
Miranda Infante is a 80 y.o. female presenting for/with:    Dizziness (Since wednesday feels very dizzy and double vision. ); Ankle swelling (right worse than left); Indigestion (feels extremely dehydrated. ); Tremors (entire body. ); and Fatigue    HPI    Pre-Diabetes. Sugars controlled well  Hypoglycemia: none  Tolerating current treatment well  Current medications include diet only. Never on meds for DM2. Lab Results   Component Value Date/Time    Hemoglobin A1c 5.7 04/08/2016 12:23 PM    Hemoglobin A1c 6.7 10/21/2015 10:15 AM    Hemoglobin A1c 6.6 06/04/2015 12:10 PM    Glucose 104 01/06/2017 12:42 PM    LDL, calculated 76 10/21/2015 10:15 AM    Creatinine 0.94 01/06/2017 12:42 PM     Lab Results   Component Value Date/Time    Microalbumin, urine 114.3 12/22/2015 12:21 PM     Follow- up for abn thyroid levels and abn PTH  Multinodular toxic goiter. Last labs show normal parathyroid and improved overactive thyroid. We con't to hold methimazole last visit due to hx itching rash. Con't bisoprolol at 10mg every day. Still having some tremor. Less issues with  racing thoughts. Lab Results   Component Value Date/Time    TSH 0.684 01/06/2017 12:42 PM    TSH 0.064 06/16/2016 12:57 PM    T3 Uptake 26 04/11/2016 05:42 PM    T4, Free 1.35 06/16/2016 12:57 PM    T4, Total 11.6 04/11/2016 05:42 PM     Lab Results   Component Value Date/Time    Calcium 11.5 01/06/2017 12:42 PM    PTH, Intact 36 10/26/2016 12:13 PM     Hypertension. Blood pressures have been stable. Management at last visit included con't bisoprolol 10mg qd. Current regimen: beta-blocker (due to hx of hyperthyroid). Also taking and magnesium, potassium. Prev on loop diuretic, has been held lately. Symptoms include no symptoms. Patient denies palpitations, edema.   Lab review:   Lab Results   Component Value Date/Time    Sodium 143 01/06/2017 12:42 PM    Potassium 4.9 01/06/2017 12:42 PM    Chloride 104 01/06/2017 12:42 PM    CO2 26 01/06/2017 12:42 PM    Glucose 104 01/06/2017 12:42 PM    BUN 22 01/06/2017 12:42 PM    Creatinine 0.94 01/06/2017 12:42 PM    BUN/Creatinine ratio 23 01/06/2017 12:42 PM    GFR est AA 65 01/06/2017 12:42 PM    GFR est non-AA 56 01/06/2017 12:42 PM    Calcium 11.5 01/06/2017 12:42 PM     PMH, SH, Medications/Allergies: reviewed, on chart. Pt's  d/c from cancer late Oct 2016. ROS:  Constitutional: No fever, chills or weight loss  Respiratory: No cough, SOB   CV: No recent chest pain, No Palpitations    Visit Vitals    /76 (BP 1 Location: Right arm, BP Patient Position: Sitting)    Pulse 61    Temp 96.5 °F (35.8 °C) (Oral)    Resp 19    SpO2 98%     Wt Readings from Last 3 Encounters:   04/07/17 137 lb (62.1 kg)   02/03/17 135 lb (61.2 kg)   01/06/17 133 lb (60.3 kg)     BP Readings from Last 3 Encounters:   04/14/17 138/76   04/07/17 (!) 155/94   02/03/17 162/78     Physical Examination: General appearance - alert, well appearing, and in no distress  Mental status - alert, oriented to person, place, and time  Eyes - pupils equal and reactive, extraocular eye movements intact. No sign copper deposits on direct opthalmoscopy. ENT - bilateral external ears and nose normal. Normal lips  Neck - supple, no significant adenopathy, no palpable thyromegaly. Lymphatics - no palpable lymphadenopathy, no hepatosplenomegaly  Chest - clear to auscultation, no wheezes, rales or rhonchi, symmetric air entry  Heart - normal rate, regular rhythm, normal S1, S2, no murmurs, rubs, clicks or gallops  Extremities - peripheral pulses normal, no pedal edema, no clubbing or cyanosis    A/P:  Multinodular, mildly toxic goiter. Doing well on bisoprolol 10mg every day, but tremor worsening. Try to get in with Dr. Fredrick Cook. 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 Ca++ is high, not low, so probably not hypocalcemia related. Also in ddx is soraida's chorea and brain lesion.  Consider neurology eval. Start seroquel 25mg qhs, and get an MRI head/brain. Meclizine prn dizziness. Not able to easily test for huntingtons, so will defer that to Neuro if we initiate that referral.    Osteoporosis. Fixing thyroid should help, but given fx risk, pt does meet criteria for other agent. PT does have breast ca hx, so evista may be a good choice. Pt will consider. Plan DXA 2018    HTN  In goal. con't current regimen. F/U per labs/studies or per spec eval, or in 3mo.

## 2017-04-14 NOTE — PATIENT INSTRUCTIONS
For dizziness, you can take bonine (meclizine) OTC 12.5mg every 4hours as needed. Chorea Information Page  A patient primer from the [facility]    What is chorea? Chorea refers to involuntary movements characterized by their random, brief, and non-rhythmic character. They are often described as seeming to flow from one body part to another unpredictably, though they can also be confined to a single area of the body (such as the mouth area or hands). Choreic movements can often look like restlessness or fidgeting to those not familiar with them. They can usually be reduced to some degree when the affected person concentrates on suppressing them. The movements can also be masked or hidden when they are combined with normal voluntary movements. Patients with chorea will sometimes become clumsy or drop objects repeatedly, and chorea can lead to falling when it affects walking. The characteristic pattern of walking that occurs in chorea is often easily recognizable by movement disorders neurologists. What causes chorea? Chorea is a symptom and not a specific disease, similar to the way a fever can happen for many different reasons. Chorea can be caused by a variety of abnormal processes in the body, including metabolic derangements, exposure to certain drugs or toxins, genetic and degenerative diseases of the brain, infections, tumors, and disorders of the immune and inflammatory systems of the body. When these processes affect a specific region of the brain called the basal ganglia and its connections, they can produce chorea. Is chorea the same thing as Jewells disease? Jewells disease is a genetic disease caused by a specific gene mutation. It is the most widely recognized cause of chorea, but it is just one cause, and not even the most common one. Do patients with Parkinsons disease get chorea?      In patients who take medications to treat the symptoms of Parkinsons disease, the medications can cause choreic movements that are more commonly referred to as dyskinesias. These movements occur secondary to the anti-parkinsonian medications and are not caused by Parkinsons disease directly. Medications that are used to treat Parkinsons disease can sometimes cause other abnormal movements as well, such as dystonia. My doctor mentioned athetosis. Is athetosis the same thing as chorea? What is choreoathetosis? Athetosis is a form of chorea that is slow and writhing in quality. Most movement disorders neurologists consider it a form of chorea, and often athetosis can change over time into chorea and vice versa. When both chorea and athetosis occur at the same time, it is sometimes referred to as choreoathetosis. Athetosis and choreoathetosis can sometimes be mistaken for another involuntary movement called dystonia. Who does chorea affect? Since many different types of diseases and disorders can cause chorea, it can affect almost anyone from infants and children to the elderly, and it can occur in both males and females. The number of people affected with specific diseases that cause chorea is known and the number of people who have chorea or develop it every year is difficult to study. How is chorea diagnosed? Chorea is diagnosed clinically, meaning that it is based on the examination of an experienced physician. There is currently no objective test that differentiates chorea from other types of involuntary movements. The clinical evaluation includes a detailed history of other medical problems, prior surgeries, previous infections you may have had, exposures to medications and toxins (including alcohol and illegal drugs), and a family history of diseases that occurred in your relatives. You should be ready to answer questions such as when the movements started, whether it was a quick or gradual onset, and any associated symptoms. It can be helpful to try to obtain a list of all the medications you have been prescribed within a year of onset of involuntary movements from your pharmacy. Once the clinical diagnosis of chorea is made, your physician will likely need to order additional tests to try to identify the underlying cause of the movements. This usually includes blood tests and imaging studies of the brain such as magnetic resonance imaging (MRI), and can sometimes include more specialized tests. Determining the cause of chorea can sometimes be challenging even for movement disorders specialists, and in some patients with chorea the cause cannot be identified. How is chorea treated? The treatment of chorea is first directed at treating the underlying cause of the movements if possible. This may include discontinuing a medication, correcting a metabolic abnormality, or medically treating an autoimmune, infectious, rheumatologic, or endocrinologic cause of chorea. In some cases in which chorea is due to prior damage to the brain or an ongoing degenerative process, there may not be a treatment available to influence the underlying disease process. The choreic movements themselves can be treated with medications that can help to suppress them. In very specific cases neurosurgical procedures can sometimes be considered. Where do I find more information on Chorea? Please feel free to contact me, Dr. David Toro, at the [facility] (Contact Us Form) for further information. Other sites online tend to be disease-specific, and are most helpful once a diagnosis is made.

## 2017-04-14 NOTE — MR AVS SNAPSHOT
Visit Information Date & Time Provider Department Dept. Phone Encounter #  
 4/14/2017  2:00 PM Madalyn Salgado, Jared Schultz [id number] Follow-up Instructions Return in about 3 months (around 7/14/2017). Your Appointments 6/9/2017 11:30 AM  
ESTABLISHED PATIENT with MD Kera Montano 38 ([address]) Appt Note: 2 MO F/U  
 [address] [place] [address],[address] [address] [phone]  
  
   
 [address] [place] [address],[address] [address]  
  
    
 10/6/2017 11:30 AM  
ESTABLISHED PATIENT with MD Kera Montano 38 ([address]) Appt Note: 6 mo f/u  
 [address] [place] [address],[address] [address] [phone] Upcoming Health Maintenance Date Due DTaP/Tdap/Td series (1 - Tdap) 6/2/2008 GLAUCOMA SCREENING Q2Y 2/3/2018 MEDICARE YEARLY EXAM 2/4/2018 Allergies as of 4/14/2017  Review Complete On: 4/14/2017 By: Dulce Giron Severity Noted Reaction Type Reactions Contrast Agent [Iodine]  03/02/2015    Unknown (comments) Doxycycline  03/02/2015    Unknown (comments) Erythromycin  03/02/2015    Unknown (comments) Fosamax [Alendronate]  01/11/2016    Other (comments) Patient stated she is not allergic to this medication. Macrodantin [Nitrofurantoin Macrocrystalline]  03/02/2015    Unknown (comments) Sulfa (Sulfonamide Antibiotics)  03/02/2015    Hives Eyes turned red Tamoxifen  04/07/2017    Other (comments) Current Immunizations  Reviewed on 10/26/2015 Name Date Influenza High Dose Vaccine PF 9/2/2016, 10/1/2015 Influenza Vaccine 10/17/2014 Pneumococcal Conjugate (PCV-13) 9/2/2016 Pneumococcal Vaccine (Unspecified Type) 10/1/2009 Td 6/1/2008 Zoster Vaccine, Live 6/1/2007 Not reviewed this visit You Were Diagnosed With   
  
 Codes Comments Toxic multinodular goiter    -  Primary ICD-10-CM: E05.20 ICD-9-CM: 242.20 Essential hypertension     ICD-10-CM: I10 
ICD-9-CM: 401. 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  
  
   
This list is accurate as of: 4/14/17  3:53 PM.  Always use your most recent med list.  
  
  
  
  
 aspirin delayed-release 81 mg tablet Take  by mouth daily. bisoprolol 10 mg tablet Commonly known as:  Glenard Pott Take 1 Tab by mouth daily. Indications: thyroid, pressure, heart  
  
 glucosamine-chondroitin 750-600 mg Tab Take  by mouth. omeprazole 40 mg capsule Commonly known as:  PRILOSEC Take 1 Cap by mouth daily. potassium chloride SR 10 mEq tablet Commonly known as:  KLOR-CON 10 Take 2 Tabs by mouth daily. Indications: pressure, new lower dose QUEtiapine 25 mg tablet Commonly known as:  SEROquel Take 1 Tab by mouth nightly. Indications: tremors/chorea STOOL SOFTENER 100 mg capsule Generic drug:  docusate sodium Take 100 mg by mouth two (2) times a day. VITAMIN B-12 1,000 mcg tablet Generic drug:  cyanocobalamin Take 1,000 mcg by mouth daily. VITAMIN D3 1,000 unit Cap Generic drug:  cholecalciferol Take  by mouth daily. Prescriptions Sent to Pharmacy Refills QUEtiapine (SEROQUEL) 25 mg tablet 3 Sig: Take 1 Tab by mouth nightly. Indications: tremors/chorea Class: Normal  
 Pharmacy: [facility], [address] [facility] [address] [facility] #: [phone] Route: Oral  
  
Follow-up Instructions Return in about 3 months (around 7/14/2017). To-Do List   
 04/14/2017 Imaging:  MRI BRAIN WO CONT Patient Instructions For dizziness, you can take bonine (meclizine) OTC 12.5mg every 4hours as needed. Chorea Information Page A patient primer from the [address] [facility] What is chorea? Chorea refers to involuntary movements characterized by their random, brief, and non-rhythmic character. They are often described as seeming to flow from one body part to another unpredictably, though they can also be confined to a single area of the body (such as the mouth area or hands). Choreic movements can often look like restlessness or fidgeting to those not familiar with them. They can usually be reduced to some degree when the affected person concentrates on suppressing them. The movements can also be masked or hidden when they are combined with normal voluntary movements. Patients with chorea will sometimes become clumsy or drop objects repeatedly, and chorea can lead to falling when it affects walking. The characteristic pattern of walking that occurs in chorea is often easily recognizable by movement disorders neurologists. What causes chorea? Chorea is a symptom and not a specific disease, similar to the way a fever can happen for many different reasons. Chorea can be caused by a variety of abnormal processes in the body, including metabolic derangements, exposure to certain drugs or toxins, genetic and degenerative diseases of the brain, infections, tumors, and disorders of the immune and inflammatory systems of the body. When these processes affect a specific region of the brain called the basal ganglia and its connections, they can produce chorea. Is chorea the same thing as Kensals disease?   
 
Kensals disease is a genetic disease caused by a specific gene mutation. It is the most widely recognized cause of chorea, but it is just one cause, and not even the most common one. Do patients with Parkinsons disease get chorea? In patients who take medications to treat the symptoms of Parkinsons disease, the medications can cause choreic movements that are more commonly referred to as dyskinesias. These movements occur secondary to the anti-parkinsonian medications and are not caused by Parkinsons disease directly. Medications that are used to treat Parkinsons disease can sometimes cause other abnormal movements as well, such as dystonia. My doctor mentioned athetosis. Is athetosis the same thing as chorea? What is choreoathetosis? Athetosis is a form of chorea that is slow and writhing in quality. Most movement disorders neurologists consider it a form of chorea, and often athetosis can change over time into chorea and vice versa. When both chorea and athetosis occur at the same time, it is sometimes referred to as choreoathetosis. Athetosis and choreoathetosis can sometimes be mistaken for another involuntary movement called dystonia. Who does chorea affect? Since many different types of diseases and disorders can cause chorea, it can affect almost anyone from infants and children to the elderly, and it can occur in both males and females. The number of people affected with specific diseases that cause chorea is known and the number of people who have chorea or develop it every year is difficult to study. How is chorea diagnosed? Chorea is diagnosed clinically, meaning that it is based on the examination of an experienced physician. There is currently no objective test that differentiates chorea from other types of involuntary movements.  The clinical evaluation includes a detailed history of other medical problems, prior surgeries, previous infections you may have had, exposures to medications and toxins (including alcohol and illegal drugs), and a family history of diseases that occurred in your relatives. You should be ready to answer questions such as when the movements started, whether it was a quick or gradual onset, and any associated symptoms. It can be helpful to try to obtain a list of all the medications you have been prescribed within a year of onset of involuntary movements from your pharmacy. Once the clinical diagnosis of chorea is made, your physician will likely need to order additional tests to try to identify the underlying cause of the movements. This usually includes blood tests and imaging studies of the brain such as magnetic resonance imaging (MRI), and can sometimes include more specialized tests. Determining the cause of chorea can sometimes be challenging even for movement disorders specialists, and in some patients with chorea the cause cannot be identified. How is chorea treated? The treatment of chorea is first directed at treating the underlying cause of the movements if possible. This may include discontinuing a medication, correcting a metabolic abnormality, or medically treating an autoimmune, infectious, rheumatologic, or endocrinologic cause of chorea. In some cases in which chorea is due to prior damage to the brain or an ongoing degenerative process, there may not be a treatment available to influence the underlying disease process. The choreic movements themselves can be treated with medications that can help to suppress them. In very specific cases neurosurgical procedures can sometimes be considered. Where do I find more information on Chorea? Please feel free to contact me, Dr. Percy Velazquez, at the [facility] (Contact Us Form) for further information. Other sites online tend to be disease-specific, and are most helpful once a diagnosis is made. Introducing \[facility]\"" & HEALTH SERVICES! Dear Shermon Merlin: 
Thank you for requesting a [employer] account. Our records indicate that you already have an active [employer] account. You can access your account anytime at [email or web address] Did you know that you can access your hospital and ER discharge instructions at any time in [employer]? You can also review all of your test results from your hospital stay or ER visit. Additional Information If you have questions, please visit the Frequently Asked Questions section of the [employer] website at [email or web address] Remember, [employer] is NOT to be used for urgent needs. For medical emergencies, dial 911. Now available from your iPhone and Android! Please provide this summary of care documentation to your next provider. Your primary care clinician is listed as Rosa Barnett. If you have any questions after today's visit, please call [phone].

## 2017-04-20 ENCOUNTER — HOSPITAL ENCOUNTER (OUTPATIENT)
Dept: ULTRASOUND IMAGING | Age: 82
Discharge: HOME | End: 2017-04-20
Attending: OTOLARYNGOLOGY
Payer: MEDICARE

## 2017-04-20 ENCOUNTER — HOSPITAL ENCOUNTER (OUTPATIENT)
Dept: MRI IMAGING | Age: 82
Discharge: HOME | End: 2017-04-20
Attending: FAMILY MEDICINE
Payer: MEDICARE

## 2017-04-20 DIAGNOSIS — G25.5 CHOREA: ICD-10-CM

## 2017-04-20 DIAGNOSIS — E04.9 GOITER: ICD-10-CM

## 2017-04-20 PROCEDURE — 70551 MRI BRAIN STEM W/O DYE: CPT

## 2017-04-20 PROCEDURE — 76536 US EXAM OF HEAD AND NECK: CPT

## 2017-04-24 ENCOUNTER — TELEPHONE (OUTPATIENT)
Dept: FAMILY MEDICINE CLINIC | Age: 82
End: 2017-04-24

## 2017-04-24 NOTE — TELEPHONE ENCOUNTER
Leanne Cartagena called and asked if you could please give her a call back at [phone]. She went to see Dr Ronnie Meléndez and they made her an apt to have some needle biopsies done. They wanted her to check with you and make sure it's ok to go off her low dose aspirin until after May 1st.  Could you please call her back at advice. Thank you.

## 2017-04-24 NOTE — TELEPHONE ENCOUNTER
It would be fine for her to stop her aspirin for a couple of months to decrease risk of biopsy problems. Please inform.

## 2017-05-01 ENCOUNTER — HOSPITAL ENCOUNTER (OUTPATIENT)
Dept: ULTRASOUND IMAGING | Age: 82
Discharge: HOME | End: 2017-05-01
Attending: OTOLARYNGOLOGY
Payer: MEDICARE

## 2017-05-01 PROCEDURE — 88173 CYTOPATH EVAL FNA REPORT: CPT | Performed by: OTOLARYNGOLOGY

## 2017-05-01 PROCEDURE — 10022 US GUIDE FINE NDL ASP W IMAGE: CPT

## 2017-05-01 PROCEDURE — 88172 CYTP DX EVAL FNA 1ST EA SITE: CPT | Performed by: OTOLARYNGOLOGY

## 2017-05-12 ENCOUNTER — OFFICE VISIT (OUTPATIENT)
Dept: ENDOCRINOLOGY | Age: 82
End: 2017-05-12

## 2017-05-12 VITALS
BODY MASS INDEX: 22.11 KG/M2 | HEART RATE: 51 BPM | DIASTOLIC BLOOD PRESSURE: 81 MMHG | HEIGHT: 66 IN | WEIGHT: 137.6 LBS | SYSTOLIC BLOOD PRESSURE: 172 MMHG

## 2017-05-12 DIAGNOSIS — E05.20 TOXIC MULTINODULAR GOITER: Primary | ICD-10-CM

## 2017-05-12 DIAGNOSIS — E21.3 HYPERPARATHYROIDISM (HCC): ICD-10-CM

## 2017-05-12 RX ORDER — METHIMAZOLE 5 MG/1
TABLET ORAL
Qty: 16 TAB | Refills: 11 | Status: SHIPPED | OUTPATIENT
Start: 2017-05-12 | End: 2017-05-17 | Stop reason: SDUPTHER

## 2017-05-12 NOTE — MR AVS SNAPSHOT
Visit Information Date & Time Provider Department Dept. Phone Encounter #  
 5/12/2017 10:50 AM Tamar Holland, [address] [place] [address] Diabetes and Endocrinology [phone] [id number] Follow-up Instructions Return in about 5 months (around 10/12/2017). Your Appointments 6/9/2017 11:30 AM  
ESTABLISHED PATIENT with MD Kera Mooney 38 ([address]) Appt Note: 2 MO F/U  
 [address] [address] [phone]  
  
   
 [address] [address]  
  
    
 10/6/2017 11:30 AM  
ESTABLISHED PATIENT with MD Kera Mooney 38 ([address]) Appt Note: 6 mo f/u  
 [address] [place] [address],[address] [address] [phone] Upcoming Health Maintenance Date Due DTaP/Tdap/Td series (1 - Tdap) 6/2/2008 INFLUENZA AGE 9 TO ADULT 8/1/2017 GLAUCOMA SCREENING Q2Y 2/3/2018 MEDICARE YEARLY EXAM 2/4/2018 Allergies as of 5/12/2017  Review Complete On: 5/12/2017 By: Tamar Holland MD  
  
 Severity Noted Reaction Type Reactions Contrast Agent [Iodine]  03/02/2015    Unknown (comments) Doxycycline  03/02/2015    Unknown (comments) Erythromycin  03/02/2015    Unknown (comments) Macrodantin [Nitrofurantoin Macrocrystalline]  03/02/2015    Unknown (comments) Sulfa (Sulfonamide Antibiotics)  03/02/2015    Hives Eyes turned red Tamoxifen  04/07/2017    Other (comments) Current Immunizations  Reviewed on 10/26/2015 Name Date Influenza High Dose Vaccine PF 9/2/2016, 10/1/2015 Influenza Vaccine 10/17/2014 Pneumococcal Conjugate (PCV-13) 9/2/2016 Pneumococcal Vaccine (Unspecified Type) 10/1/2009 Td 6/1/2008 Zoster Vaccine, Live 6/1/2007 Not reviewed this visit You Were Diagnosed With   
  
 Codes Comments Toxic multinodular goiter    -  Primary ICD-10-CM: E05.20 ICD-9-CM: 242.20 Hyper[facility] ([facility]     ICD-10-CM: E21.3 ICD-9-CM: 252.00 Vitals BP Pulse Height(growth percentile) Weight(growth percentile) BMI OB Status 172/81 (BP 1 Location: Right arm, BP Patient Position: Sitting) (!) 51 5' 6\" (1.676 m) 137 lb 9.6 oz (62.4 kg) 22.21 kg/m2 Hysterectomy Smoking Status Never Smoker Vitals History BMI and BSA Data Body Mass Index Body Surface Area  
 22.21 kg/m 2 1.7 m 2 Preferred Pharmacy Pharmacy Name Phone [address], [address] AT [facility] OF  [address] & [address] [facility] [phone] Your Updated Medication List  
  
   
This list is accurate as of: 5/12/17 12:12 PM.  Always use your most recent med list.  
  
  
  
  
 aspirin delayed-release 81 mg tablet Take  by mouth daily. bisoprolol 10 mg tablet Commonly known as:  Randy Merry Take 1 Tab by mouth daily. Indications: thyroid, pressure, heart  
  
 glucosamine-chondroitin 750-600 mg Tab Take  by mouth two (2) times a day. methIMAzole 5 mg tablet Commonly known as:  TAPAZOLE Take one tablet on Monday, Wednesday, Friday and Saturday for thyroid  
  
 omeprazole 40 mg capsule Commonly known as:  PRILOSEC Take 1 Cap by mouth daily. potassium chloride SR 10 mEq tablet Commonly known as:  KLOR-CON 10 Take 2 Tabs by mouth daily. Indications: pressure, new lower dose STOOL SOFTENER 100 mg capsule Generic drug:  docusate sodium Take 100 mg by mouth three (3) times daily as needed. VITAMIN B-12 1,000 mcg tablet Generic drug:  cyanocobalamin Take 1,000 mcg by mouth daily. Prescriptions Printed Refills  
 methIMAzole (TAPAZOLE) 5 mg tablet 11 Sig: Take one tablet on Monday, Wednesday, Friday and Saturday for thyroid Class: Print Follow-up Instructions Return in about 5 months (around 10/12/2017). To-Do List   
 Around 07/12/2017 Lab: METABOLIC PANEL, BASIC Around 07/12/2017 Lab:  T4, FREE Around 07/12/2017 Lab:  TSH 3RD GENERATION Around 10/12/2017 Lab:  METABOLIC PANEL, BASIC Around 10/12/2017 Lab:  T4, FREE Around 10/12/2017 Lab:  TSH 3RD GENERATION Patient Instructions 1) Please restart methimazole 5 mg 4 times per week on Mon, Wed, Fri, and Sat. If you have any recurrence of rash or eye problems, stop immediately and call me at [phone]. 
 
2) Stop the vitamin D as this could be making your calcium level worse. 3) Go to Dr. Emiliano Edwards office and repeat your labs in 2 months and I'll contact you with the results to see if your calcium is looking better and if your thyroid is doing better. Introducing \[facility]\"" & HEALTH SERVICES! Dear Lizette Brady: 
Thank you for requesting a [employer] account. Our records indicate that you already have an active [employer] account. You can access your account anytime at [email or web address] Did you know that you can access your hospital and ER discharge instructions at any time in [employer]? You can also review all of your test results from your hospital stay or ER visit. Additional Information If you have questions, please visit the Frequently Asked Questions section of the [employer] website at [email or web address] Remember, [employer] is NOT to be used for urgent needs. For medical emergencies, dial 911. Now available from your iPhone and Android! Please provide this summary of care documentation to your next provider. Your primary care clinician is listed as George Barnett. If you have any questions after today's visit, please call [phone].

## 2017-05-12 NOTE — PATIENT INSTRUCTIONS
1) Please restart methimazole 5 mg 4 times per week on Mon, Wed, Fri, and Sat. If you have any recurrence of rash or eye problems, stop immediately and call me at [phone].    2) Stop the vitamin D as this could be making your calcium level worse. 3) Go to Dr. Jeanne Chao office and repeat your labs in 2 months and I'll contact you with the results to see if your calcium is looking better and if your thyroid is doing better.

## 2017-05-12 NOTE — PROGRESS NOTES
Chief Complaint   Patient presents with    Thyroid Problem     pcp and pharmacy confirmed     History of Present Illness: Leanne Fitch is a 80 y.o. female who is a new patient for thyroid. In March 2015, she was found to have a low TSH of 0.16 and this was just watched over time and her TSH slowly drifted down over the next year and was 0.06 in 4/16. Had a thyroid ultrasound in 6/16 at [facility] that showed a multinodular goiter with largest nodule of 2.9 cm in the right mid lobe. Her fiancee's health declined in the summer of 2016 and he eventually passed away in 10/16. She was experiencing a lot of stress during this period and developed more tremors with jerky movements that were difficult to control. Has had some chest discomfort on the left side but feels her heart beating more prominently on the right side and changing her position can relieve with discomfort. Her TSH was down to 0.05 in 10/16 and Dr. Shubham Mata decided to give a trial of methimazole 5 mg once daily and with taking this she started feeling better and energy was improving and tremors were improving. Started to gain some of the weight back she had lost which she estimated to be over 15 lbs. However, she developed some watering and redness of her eyes and small areas of intermittent rash on the arms and legs in Jan 2017 and was advised to stop the methimazole and her symptoms went away. Her TSH was back to normal at 0.68 in 1/17. With coming off the methimazole she started feeling worse again and was referred to Dr. Katrina Gordillo whom she saw on 4/20/17. He repeated labs that showed her TSH was down to 0.15 with a normal FT4 of 1.27 and T3 of 127. He ordered a thyroid ultrasound that showed a right parathyroid atypical mass vs lymph node and was sent for biopsy of her right lobe nodule and this was done on 5/1/17 and came back benign and she was sent to me for further evaluation. Bowels are constipated but unchanged. Some cold intolerance.   Has had some return of fatigue off the methimazole and less desire for food off the methimazole. Has also had high calcium levels in the 11-11.7 range over the past 2 years in our system with a PTH of 88 in 12/15 and has been 36-53 range on repeat testing. However, has been told at least 15 years ago that her calcium level was high. Did have one kidney stone years ago. Is on vitamin D 1000 units daily but no calcium supplements or mvi. Vitamin D was 57 in 7/15. Her 24 hour urine calcium was low at 49 in 7/15. 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    On C-scope 2008      Past Surgical History:   Procedure Laterality Date    HX APPENDECTOMY      HX BREAST LUMPECTOMY      HX BREAST LUMPECTOMY Left 1998    HX BREAST LUMPECTOMY Right 1999    HX CATARACT REMOVAL Bilateral     HX HEENT      nose surgery after skin cancer removed    HX HEENT      YAG both eyes    HX HYSTERECTOMY  1980    HX LITHOTRIPSY  1979    HX MASTECTOMY  2002    HX MASTECTOMY Left     HX OTHER SURGICAL      had left lower rib removed to extract a kidney stone    HX TUBAL LIGATION       Current Outpatient Prescriptions   Medication Sig    potassium chloride SR (KLOR-CON 10) 10 mEq tablet Take 2 Tabs by mouth daily. Indications: pressure, new lower dose    cholecalciferol (VITAMIN D3) 1,000 unit cap Take  by mouth daily.  docusate sodium (STOOL SOFTENER) 100 mg capsule Take 100 mg by mouth three (3) times daily as needed.     omeprazole (PRILOSEC) 40 mg capsule Take 1 Cap by mouth daily.  bisoprolol (ZEBETA) 10 mg tablet Take 1 Tab by mouth daily. Indications: thyroid, pressure, heart    aspirin delayed-release 81 mg tablet Take  by mouth daily.  GLUCOSAMINE HCL/CHONDR PRESCOTT A NA (GLUCOSAMINE-CHONDROITIN) 750-600 mg tab Take  by mouth two (2) times a day.  cyanocobalamin (VITAMIN B-12) 1,000 mcg tablet Take 1,000 mcg by mouth daily. No current facility-administered medications for this visit. Allergies   Allergen Reactions    Contrast Agent [Iodine] Unknown (comments)    Doxycycline Unknown (comments)    Erythromycin Unknown (comments)    Macrodantin [Nitrofurantoin Macrocrystalline] Unknown (comments)    Sulfa (Sulfonamide Antibiotics) Hives     Eyes turned red    Tamoxifen Other (comments)     Family History   Problem Relation Age of Onset    Cancer Mother      lymphoma    Heart Disease Father     Heart Disease Maternal Grandmother     Diabetes Maternal Grandfather     Heart Disease Paternal Grandmother     Colon Cancer Maternal Aunt     Other Daughter      Overdose    Thyroid Disease Neg Hx      Social History     Social History    Marital status: SINGLE     Spouse name: N/A    Number of children: N/A    Years of education: N/A     Occupational History    Not on file. Social History Main Topics    Smoking status: Never Smoker    Smokeless tobacco: Never Used    Alcohol use 0.0 oz/week     0 Standard drinks or equivalent per week      Comment: on the holidays may have a mixed    Drug use: No    Sexual activity: Not on file     Other Topics Concern    Not on file     Social History Narrative    Lives in [place] with her grandson. Had 3 children (1 son and 2 daughters and one of her daughters passed away). . Then her 2nd fiancee passed away in October 2016 after 25 years of being together. 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   Likes to play [employer] and [employer] and garden club. Review of Systems:  - Constitutional Symptoms: no fevers, chills, (+) weight loss as above  - Eyes: some blurry vision, intermittent double vision  - Cardiovascular: occ chest pain, no palpitations  - Respiratory: occ cough and shortness of breath if rushing to get to the phone  - Gastrointestinal: occ dysphagia and abdominal pain  - Musculoskeletal: no joint pains, some weakness in her knees  - Integumentary: no rashes   - Neurological: no numbness, tingling, or headaches  - Psychiatric: no depression or anxiety  - Endocrine: no heat or cold intolerance, no polyuria or polydipsia    Physical Examination:  Blood pressure 172/81, pulse (!) 51, height 5' 6\" (1.676 m), weight 137 lb 9.6 oz (62.4 kg). - General: pleasant, no distress, good eye contact  - HEENT: no exopthalmos, no periorbital edema, no scleral/conjunctival injection, EOMI, no lid lag or stare  - Neck: supple, small goiter with nodular feel, no masses, lymph nodes, or carotid bruits, no supraclavicular or dorsocervical fat pads  - Cardiovascular: regular with occ extra beats, bradycardic, normal S1 and S2, no murmurs/rubs/gallops   - Respiratory: clear to auscultation bilaterally  - Gastrointestinal: soft, nontender, nondistended, no masses, no hepatosplenomegaly  - Musculoskeletal: no proximal muscle weakness in upper or lower extremities  - Integumentary: no acanthosis nigricans, no abdominal striae, no rashes, no edema  - Neurological: reflexes 3+ at biceps, (+) tremor  - Psychiatric: normal mood and affect    Data Reviewed:   - labs as above  - reviewed thyroid ultrasound reports  - reviewed pathology report    Assessment/Plan:   1. Toxic multinodular goiter:  In March 2015, she was found to have a low TSH of 0.16 and this was just watched over time and her TSH slowly drifted down over the next year and was 0.06 in 4/16.   Had a thyroid ultrasound in 6/16 at [facility] that showed a multinodular goiter with largest nodule of 2.9 cm in the right mid lobe. Her TSH was down to 0.05 in 10/16 and Dr. Jerman Torres decided to give a trial of methimazole 5 mg once daily and with taking this she started feeling better and energy was improving and tremors were improving. Started to gain some of the weight back she had lost which she estimated to be over 15 lbs. However, she developed some watering and redness of her eyes and small areas of intermittent rash on the arms and legs in Jan 2017 and was advised to stop the methimazole and her symptoms went away. Her TSH was back to normal at 0.68 in 1/17. With coming off the methimazole she started feeling worse again and was referred to Dr. Red Alvarado whom she saw on 4/20/17. He repeated labs that showed her TSH was down to 0.15 with a normal FT4 of 1.27 and T3 of 127. He ordered a thyroid ultrasound that showed a right parathyroid atypical mass vs lymph node and was sent for biopsy of her right lobe nodule and this was done on 5/1/17 and came back benign and she was sent to me for further evaluation. It sounds like she has developed hyperthyroidism from her nodules and with the methimazole, she was able to normalize her TSH and symptoms were improving. It's unclear if her mild rash and watering and redness of her eyes was truly from the methimazole and she is willing to restart the methimazole at a lower dose of 5 mg 4x/week. I told her if she has any return of side effects to stop the med immediately and call me. We then can consider a trial of PTU vs pursuing radioactive iodine. I would not favor surgery given her age and other co-morbidities. - begin methimazole 5 mg 4x/week  - check TSH and free T4 in 2 months prior to next visit     2. Hyperparathyroidism [facility]): Has had high calcium levels in the 11-11.7 range over the past 2 years in our system with a PTH of 88 in 12/15 and has been 36-53 range on repeat testing. However, has been told at least 15 years ago that her calcium level was high.   Did have one kidney stone years ago. Is on vitamin D 1000 units daily but no calcium supplements or mvi. Vitamin D was 57 in 7/15. Her 24 hour urine calcium was low at 49 in 7/15. It's possible the hyperthyroidism may be exacerbating the hypercalcemia so hopefully with addressing this as above, this will help with the calcium. I will have her stop the vitamin D to avoid hyperabsorption of dietary calcium. Treatment of this condition is surgical and would not want to pursue this unless her calcium kecia over 12.  - stop vitamin D   - check bmp in 2 months and prior to next visit        Patient Instructions   1) Please restart methimazole 5 mg 4 times per week on Mon, Wed, Fri, and Sat. If you have any recurrence of rash or eye problems, stop immediately and call me at [phone].    2) Stop the vitamin D as this could be making your calcium level worse. 3) Go to Dr. Reyes Garcia office and repeat your labs in 2 months and I'll contact you with the results to see if your calcium is looking better and if your thyroid is doing better. Follow-up Disposition:  Return in about 5 months (around 10/12/2017).     Copy sent to:  Dr. Kylee Matthews via [facility]  Dr. Fletcher Agent follow up: 7/6/17    Sent her the following message in a letter:    Resulted Orders   TSH 3RD GENERATION   Result Value Ref Range    TSH 0.889 0.450 - 4.500 uIU/mL    Narrative    Performed at:  [address]  [id number]  : Meño Veliz MD, Phone:  [phone]   METABOLIC PANEL, BASIC   Result Value Ref Range    Glucose 113 (H) 65 - 99 mg/dL    BUN 27 8 - 27 mg/dL    Creatinine 0.94 0.57 - 1.00 mg/dL    GFR est non-AA 56 (L) >59 mL/min/1.73    GFR est AA 65 >59 mL/min/1.73    BUN/Creatinine ratio 29 (H) 12 - 28    Sodium 146 (H) 134 - 144 mmol/L    Potassium 4.8 3.5 - 5.2 mmol/L    Chloride 105 96 - 106 mmol/L    CO2 25 18 - 29 mmol/L    Calcium 11.6 (H) 8.7 - 10.3 mg/dL    Narrative Performed at:  [address]  [id number]  : Lucio Berry MD, Phone:  [phone]   T4, FREE   Result Value Ref Range    T4, Free 1.04 0.82 - 1.77 ng/dL    Narrative    Performed at:  [address]  [id number]  : Lucio Berry MD, Phone:  [phone]       1) TSH is a thyroid test.  Your level is 0.889 which is normal and at goal of 0.5-2.0. This test goes opposite of your thyroid dose and suggests your dose of methimazole is working well. I saw in the note from Taniya Krueger that you are having a mild rash on your upper back and it's unclear if this could be from the methimazole or not. If this is continuing, you could try taking 1 tab 3x/week instead of 4x/week to see if the slightly lower dose will help. 2) Your calcium level is still high at 11.6 and up from 10.8 at last visit. As long as this remains under 12, we will follow this for now. Drink at least 4 8oz glasses of water everyday to stay hydrated to prevent your calcium level from going higher.

## 2017-05-17 RX ORDER — METHIMAZOLE 5 MG/1
TABLET ORAL
Qty: 52 TAB | Refills: 3 | Status: SHIPPED | OUTPATIENT
Start: 2017-05-17 | End: 2017-09-08 | Stop reason: SDUPTHER

## 2017-05-19 ENCOUNTER — LAB ONLY (OUTPATIENT)
Dept: FAMILY MEDICINE CLINIC | Age: 82
End: 2017-05-19

## 2017-05-19 DIAGNOSIS — E21.3 HYPERPARATHYROIDISM (HCC): ICD-10-CM

## 2017-05-19 DIAGNOSIS — E05.20 TOXIC MULTINODULAR GOITER: ICD-10-CM

## 2017-05-19 NOTE — MR AVS SNAPSHOT
Visit Information Date & Time Provider Department Dept. Phone Encounter #  
 5/19/2017 11:45 AM [facility] NURSE [address] [place] [address] [id number] Your Appointments 6/9/2017 11:30 AM  
ESTABLISHED PATIENT with Jeffy Lala MD  
[address] ([address]) Appt Note: 2 MO F/U  
 1000 [facility] [address] [facility],[address] [address] [phone]  
  
   
 1000 [facility] [address] [facility],[address] [address]  
  
    
 10/6/2017 11:30 AM  
ESTABLISHED PATIENT with Jeffy Lala MD  
[address] ([address]) Appt Note: 6 mo f/u  
 1000 [facility] [address] [place] [address],[address] [address] [phone]  
  
    
 10/13/2017 11:50 AM  
Follow Up with Nahomi Pena MD  
[facility] [address] [place] [address]) Appt Note: 5 month f/u  
 [address] [facility] [address] [phone] [address] [place] [address] Upcoming Health Maintenance Date Due DTaP/Tdap/Td series (1 - Tdap) 6/2/2008 INFLUENZA AGE 9 TO ADULT 8/1/2017 GLAUCOMA SCREENING Q2Y 2/3/2018 MEDICARE YEARLY EXAM 2/4/2018 Allergies as of 5/19/2017  Review Complete On: 5/12/2017 By: Nahomi Pena MD  
  
 Severity Noted Reaction Type Reactions Contrast Agent [Iodine]  03/02/2015    Unknown (comments) Doxycycline  03/02/2015    Unknown (comments) Erythromycin  03/02/2015    Unknown (comments) Macrodantin [Nitrofurantoin Macrocrystalline]  03/02/2015    Unknown (comments) Sulfa (Sulfonamide Antibiotics)  03/02/2015    Hives Eyes turned red Tamoxifen  04/07/2017    Other (comments) Current Immunizations  Reviewed on 10/26/2015 Name Date Influenza High Dose Vaccine PF 9/2/2016, 10/1/2015 Influenza Vaccine 10/17/2014 Pneumococcal Conjugate (PCV-13) 9/2/2016 Pneumococcal Vaccine (Unspecified Type) 10/1/2009 Td 6/1/2008 Zoster Vaccine, Live 6/1/2007 Not reviewed this visit You Were Diagnosed With   
  
 Codes Comments Toxic multinodular goiter     ICD-10-CM: E05.20 ICD-9-CM: 242.20 Hyperparathyroidism ([address]     ICD-10-CM: E21.3 ICD-9-CM: 252.00 Vitals OB Status Smoking Status Hysterectomy Never Smoker Preferred Pharmacy Pharmacy Name Phone [address] [facility], [address] AT [facility] [address] [place] [phone] Your Updated Medication List  
  
   
This list is accurate as of: 5/19/17 12:10 PM.  Always use your most recent med list.  
  
  
  
  
 aspirin delayed-release 81 mg tablet Take  by mouth daily. bisoprolol 10 mg tablet Commonly known as:  Yamini Meiers Take 1 Tab by mouth daily. Indications: thyroid, pressure, heart  
  
 glucosamine-chondroitin 750-600 mg Tab Take  by mouth two (2) times a day. methIMAzole 5 mg tablet Commonly known as:  TAPAZOLE Take one tablet on Monday, Wednesday, Friday and Saturday for thyroid  
  
 omeprazole 40 mg capsule Commonly known as:  PRILOSEC Take 1 Cap by mouth daily. potassium chloride SR 10 mEq tablet Commonly known as:  KLOR-CON 10 Take 2 Tabs by mouth daily. Indications: pressure, new lower dose STOOL SOFTENER 100 mg capsule Generic drug:  docusate sodium Take 100 mg by mouth three (3) times daily as needed. VITAMIN B-12 1,000 mcg tablet Generic drug:  cyanocobalamin Take 1,000 mcg by mouth daily. We Performed the Following METABOLIC PANEL, BASIC [75499 CPT(R)] T4, FREE [id number] CPT(R)] TSH 3RD GENERATION [62272 CPT(R)] Introducing [facility] & HEALTH SERVICES! Dear Lizette Brady: 
Thank you for requesting a [employer] account. Our records indicate that you already have an active [employer] account. You can access your account anytime at [email or web address] Did you know that you can access your hospital and ER discharge instructions at any time in [employer]? You can also review all of your test results from your hospital stay or ER visit. Additional Information If you have questions, please visit the Frequently Asked Questions section of the [employer] website at [email or web address] Remember, [employer] is NOT to be used for urgent needs. For medical emergencies, dial 911. Now available from your iPhone and Android! Please provide this summary of care documentation to your next provider. Your primary care clinician is listed as Markus Lanes K. Bavuso. If you have any questions after today's visit, please call [phone].

## 2017-05-20 LAB
BUN SERPL-MCNC: 14 MG/DL (ref 8–27)
BUN/CREAT SERPL: 16 (ref 12–28)
CALCIUM SERPL-MCNC: 10.8 MG/DL (ref 8.7–10.3)
CHLORIDE SERPL-SCNC: 106 MMOL/L (ref 96–106)
CO2 SERPL-SCNC: 27 MMOL/L (ref 18–29)
CREAT SERPL-MCNC: 0.85 MG/DL (ref 0.57–1)
GLUCOSE SERPL-MCNC: 117 MG/DL (ref 65–99)
POTASSIUM SERPL-SCNC: 4.1 MMOL/L (ref 3.5–5.2)
SODIUM SERPL-SCNC: 144 MMOL/L (ref 134–144)
T4 FREE SERPL-MCNC: 1.35 NG/DL (ref 0.82–1.77)
TSH SERPL DL<=0.005 MIU/L-ACNC: 0.08 UIU/ML (ref 0.45–4.5)

## 2017-05-22 NOTE — PROGRESS NOTES
I'm confused. Why did she go on 5/19 to have her labs drawn? She was supposed to go in 2 months. This has not given enough time to see the effect of restarting the methimazole.

## 2017-06-09 ENCOUNTER — OFFICE VISIT (OUTPATIENT)
Dept: FAMILY MEDICINE CLINIC | Age: 82
End: 2017-06-09

## 2017-06-09 VITALS
DIASTOLIC BLOOD PRESSURE: 63 MMHG | RESPIRATION RATE: 18 BRPM | BODY MASS INDEX: 21.89 KG/M2 | OXYGEN SATURATION: 98 % | WEIGHT: 135.6 LBS | HEART RATE: 60 BPM | TEMPERATURE: 96.8 F | SYSTOLIC BLOOD PRESSURE: 144 MMHG

## 2017-06-09 DIAGNOSIS — E05.20 TOXIC MULTINODULAR GOITER: Primary | ICD-10-CM

## 2017-06-09 DIAGNOSIS — E21.3 HYPERPARATHYROIDISM (HCC): ICD-10-CM

## 2017-06-09 DIAGNOSIS — I10 ESSENTIAL HYPERTENSION: ICD-10-CM

## 2017-06-09 RX ORDER — QUETIAPINE FUMARATE 25 MG/1
TABLET, FILM COATED ORAL
Refills: 3 | COMMUNITY
Start: 2017-04-14 | End: 2017-06-09 | Stop reason: ALTCHOICE

## 2017-06-09 NOTE — MR AVS SNAPSHOT
Visit Information Date & Time Provider Department Dept. Phone Encounter #  
 6/9/2017 11:30 AM Kourtney Gold, Jared Schultz [id number] Follow-up Instructions Return in about 3 months (around 9/9/2017). Routing History Follow-up and Disposition History Your Appointments 10/6/2017 11:30 AM  
ESTABLISHED PATIENT with Kourtney Gold MD  
[address] [facility] Appt Note: 6 mo f/u  
 1000 [facility] [address] [facility],5[address] [address] [phone]  
  
   
 [address] [address]  
  
    
 10/13/2017 11:50 AM  
Follow Up with Isaias Gates MD  
[facility]) Appt Note: 5 month f/u  
 [address] [phone] [address] [place] [address] Upcoming Health Maintenance Date Due INFLUENZA AGE 9 TO ADULT 8/1/2017 GLAUCOMA SCREENING Q2Y 2/3/2018 MEDICARE YEARLY EXAM 2/4/2018 DTaP/Tdap/Td series (2 - Td) 6/9/2027 Allergies as of 6/9/2017  Review Complete On: 6/9/2017 By: Kourtney Gold MD  
  
 Severity Noted Reaction Type Reactions Contrast Agent [Iodine]  03/02/2015    Unknown (comments) Doxycycline  03/02/2015    Unknown (comments) Erythromycin  03/02/2015    Unknown (comments) Macrodantin [Nitrofurantoin Macrocrystalline]  03/02/2015    Unknown (comments) Sulfa (Sulfonamide Antibiotics)  03/02/2015    Hives Eyes turned red Tamoxifen  04/07/2017    Other (comments) Current Immunizations  Reviewed on 6/9/2017 Name Date Influenza High Dose Vaccine PF 9/2/2016, 10/1/2015 Influenza Vaccine 10/17/2014 Pneumococcal Conjugate (PCV-13) 9/2/2016 Pneumococcal Vaccine (Unspecified Type) 10/1/2009 Td 6/1/2008 Zoster Vaccine, Live 6/1/2007  Reviewed by Saran Caceres on 6/9/2017 at 11:52 AM  
 You Were Diagnosed With   
  
 Codes Comments Toxic multinodular goiter    -  Primary ICD-10-CM: E05.20 ICD-9-CM: 242.20 Hyperparathyroidism ([address]     ICD-10-CM: E21.3 ICD-9-CM: 252.00 Essential hypertension     ICD-10-CM: I10 
ICD-9-CM: 401.9 Vitals BP Pulse Temp Resp Weight(growth percentile) SpO2  
 144/63 (BP 1 Location: Right arm, BP Patient Position: Sitting) 60 96.8 °F (36 °C) (Oral) 18 135 lb 9.6 oz (61.5 kg) 98% BMI OB Status Smoking Status 21.89 kg/m2 Hysterectomy Never Smoker BMI and BSA Data Body Mass Index Body Surface Area  
 21.89 kg/m 2 1.69 m 2 Preferred Pharmacy Pharmacy Name Phone [address], [address] AT [facility] OF  [address] Dave Sifuentes [phone] Your Updated Medication List  
  
   
This list is accurate as of: 6/9/17 12:54 PM.  Always use your most recent med list.  
  
  
  
  
 aspirin delayed-release 81 mg tablet Take  by mouth daily. bisoprolol 10 mg tablet Commonly known as:  Chris Farhad Take 1 Tab by mouth daily. Indications: thyroid, pressure, heart  
  
 glucosamine-chondroitin 750-600 mg Tab Take  by mouth two (2) times a day. methIMAzole 5 mg tablet Commonly known as:  TAPAZOLE Take one tablet on Monday, Wednesday, Friday and Saturday for thyroid  
  
 omeprazole 40 mg capsule Commonly known as:  PRILOSEC Take 1 Cap by mouth daily. STOOL SOFTENER 100 mg capsule Generic drug:  docusate sodium Take 100 mg by mouth three (3) times daily as needed. VITAMIN B-12 1,000 mcg tablet Generic drug:  cyanocobalamin Take 1,000 mcg by mouth daily. Follow-up Instructions Return in about 3 months (around 9/9/2017). Introducing [facility] & HEALTH SERVICES! Dear Aline Palmer: 
Thank you for requesting a [employer] account. Our records indicate that you already have an active [employer] account.   You can access your account anytime at [email or web address] Did you know that you can access your hospital and ER discharge instructions at any time in [employer]? You can also review all of your test results from your hospital stay or ER visit. Additional Information If you have questions, please visit the Frequently Asked Questions section of the [employer] website at [email or web address] Remember, [employer] is NOT to be used for urgent needs. For medical emergencies, dial 911. Now available from your iPhone and Android! Please provide this summary of care documentation to your next provider. Your primary care clinician is listed as Rose Barnett. If you have any questions after today's visit, please call [phone].

## 2017-06-09 NOTE — Clinical Note
PT doing a lot better since last visit, movement is much more smooth. No rash with the lower dose DURANT. She'll be getting labs next mo.  Sal

## 2017-06-09 NOTE — PROGRESS NOTES
Ariella Palacios is a 80 y.o. female presenting for/with:    Hypertension (2 month follow up )    HPI  Follow- up for abn thyroid levels and abn PTH  Multinodular toxic goiter. Last labs show normal parathyroid and improved overactive thyroid. Saw Endocrine after ENT eval didn't show any neoplasm and not interested in thyroidectomy at this point. Was r/s on lower dose DURANT, no rash this time and no eye issues. Remains on bisoprolol 10mg every day. Tremor and movement issues much better. Racing thoughts mostly resolved. Also ha d some hyper PTH, is off Vit D now, and Endo is following that. Tried seroquel, but was very sedating the next AM and pt d/c'd that after discussing with her pharmacist. Hasn't missed it, sleeping ok now. Lab Results   Component Value Date/Time    TSH 0.081 05/19/2017 11:57 AM    T3 Uptake 26 04/11/2016 05:42 PM    T4, Free 1.35 05/19/2017 11:57 AM    T4, Total 11.6 04/11/2016 05:42 PM     Lab Results   Component Value Date/Time    Calcium 10.8 05/19/2017 11:57 AM    PTH, Intact 36 10/26/2016 12:13 PM     Hypertension. Blood pressures have been stable. Management at last visit included con't bisoprolol 10mg qd. Current regimen: beta-blocker (due to hx of hyperthyroid). Symptoms include no symptoms. Patient denies palpitations, edema. Lab review:   Lab Results   Component Value Date/Time    Sodium 144 05/19/2017 11:57 AM    Potassium 4.1 05/19/2017 11:57 AM    Chloride 106 05/19/2017 11:57 AM    CO2 27 05/19/2017 11:57 AM    Glucose 117 05/19/2017 11:57 AM    BUN 14 05/19/2017 11:57 AM    Creatinine 0.85 05/19/2017 11:57 AM    BUN/Creatinine ratio 16 05/19/2017 11:57 AM    GFR est AA 73 05/19/2017 11:57 AM    GFR est non-AA 64 05/19/2017 11:57 AM    Calcium 10.8 05/19/2017 11:57 AM     PMH, SH, Medications/Allergies: reviewed, on chart. Pt's  d/c from cancer late Oct 2016.     ROS:  Constitutional: No fever, chills or weight loss  Respiratory: No cough, SOB   CV: No recent chest pain, No Palpitations    Visit Vitals    /63 (BP 1 Location: Right arm, BP Patient Position: Sitting)    Pulse 60    Temp 96.8 °F (36 °C) (Oral)    Resp 18    Wt 135 lb 9.6 oz (61.5 kg)    SpO2 98%    BMI 21.89 kg/m2     Wt Readings from Last 3 Encounters:   06/09/17 135 lb 9.6 oz (61.5 kg)   05/12/17 137 lb 9.6 oz (62.4 kg)   04/07/17 137 lb (62.1 kg)     BP Readings from Last 3 Encounters:   06/09/17 144/63   05/12/17 172/81   04/14/17 138/76     Physical Examination: General appearance - alert, well appearing, and in no distress  Mental status - alert, oriented to person, place, and time  Eyes - pupils equal and reactive, extraocular eye movements intact. ENT - bilateral external ears and nose normal. Normal lips  Neck - supple, no significant adenopathy, no palpable thyromegaly. Lymphatics - no palpable lymphadenopathy, no hepatosplenomegaly  Chest - clear to auscultation, no wheezes, rales or rhonchi, symmetric air entry  Heart - normal rate, regular rhythm, normal S1, S2, no murmurs, rubs, clicks or gallops  Extremities - peripheral pulses normal, no pedal edema, no clubbing or cyanosis  Neuro- minimal tremor now. A/P:  Multinodular, mildly toxic goiter. Doing well on bisoprolol 10mg every day and lower dose DURANT 5mg x4d/wk. See Dr. Evangelina Valles for Endo f/u as directed, labs in [address] [place] [address] in Fall. Movement disorder/chorea  Reasonable to assume that it was due to hyperthyroid. No sign brain lesion on MRI, just white matter dz. Will hold off on seroquel 25mg, was too sedating. Osteoporosis. Fixing thyroid should help, but given fx risk, pt does meet criteria for other agent. PT does have breast ca hx, so evista may be a good choice. Pt will consider. Plan DXA 2018    HTN  In goal. con't current regimen. D/C Mg++ and KCl since not on diuretics. F/U 3mo.

## 2017-07-03 ENCOUNTER — OFFICE VISIT (OUTPATIENT)
Dept: FAMILY MEDICINE CLINIC | Age: 82
End: 2017-07-03

## 2017-07-03 VITALS
OXYGEN SATURATION: 96 % | HEIGHT: 66 IN | SYSTOLIC BLOOD PRESSURE: 160 MMHG | BODY MASS INDEX: 22.66 KG/M2 | HEART RATE: 60 BPM | WEIGHT: 141 LBS | DIASTOLIC BLOOD PRESSURE: 101 MMHG | RESPIRATION RATE: 16 BRPM | TEMPERATURE: 97.2 F

## 2017-07-03 DIAGNOSIS — E05.20 TOXIC MULTINODULAR GOITER: ICD-10-CM

## 2017-07-03 DIAGNOSIS — R26.9 GAIT ABNORMALITY: Primary | ICD-10-CM

## 2017-07-03 DIAGNOSIS — R21 RASH: ICD-10-CM

## 2017-07-03 DIAGNOSIS — E21.3 HYPERPARATHYROIDISM (HCC): ICD-10-CM

## 2017-07-03 NOTE — PROGRESS NOTES
Chief Complaint   Patient presents with    Rash     back         HPI:      Yuval Chavira is a 80 y.o. female. She presents today with her daughter Miguel Angel Mancera. New Issues:  Has not been taking care of herself since her  got sick last year and she was focusing on him. She is still experiencing shaking despite the Methimazole. She has a rash now on her upper back that she does not know if it could be related to the medication. She changed all of her detergent and products about 5 months ago when her dermatologist treated her for a rash on her elbows. She has tried creams at home for the itching. She has been having a lot of issues with her strength and balance. Allergies   Allergen Reactions    Contrast Agent [Iodine] Unknown (comments)    Doxycycline Unknown (comments)    Erythromycin Unknown (comments)    Macrodantin [Nitrofurantoin Macrocrystalline] Unknown (comments)    Sulfa (Sulfonamide Antibiotics) Hives     Eyes turned red    Tamoxifen Other (comments)       Current Outpatient Prescriptions   Medication Sig    methIMAzole (TAPAZOLE) 5 mg tablet Take one tablet on Monday, Wednesday, Friday and Saturday for thyroid    docusate sodium (STOOL SOFTENER) 100 mg capsule Take 100 mg by mouth three (3) times daily as needed.  omeprazole (PRILOSEC) 40 mg capsule Take 1 Cap by mouth daily.  bisoprolol (ZEBETA) 10 mg tablet Take 1 Tab by mouth daily. Indications: thyroid, pressure, heart    aspirin delayed-release 81 mg tablet Take  by mouth daily.  GLUCOSAMINE HCL/CHONDR PRESCOTT A NA (GLUCOSAMINE-CHONDROITIN) 750-600 mg tab Take  by mouth two (2) times a day.  cyanocobalamin (VITAMIN B-12) 1,000 mcg tablet Take 1,000 mcg by mouth daily. No current facility-administered medications for this visit.         Past Medical History:   Diagnosis Date    Breast cancer ([facility]     Constipation     First degree AV block     GERD (gastroesophageal reflux disease)     Hypercalcemia 10/26/2015  [facility]     Hypertension     Hypopotassemia     IGT (impaired glucose tolerance)     Neuropathy, diabetic ([facility]     Osteoporosis     2008 Elbow Fx    Other and unspecified hyperlipidemia     Pneumonia 2006    RLL    Skin cancer of nose     Toxic multinodular goiter     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    On C-scope 2008        Past Surgical History:   Procedure Laterality Date    HX APPENDECTOMY      HX BREAST LUMPECTOMY      HX BREAST LUMPECTOMY Left 1998    HX BREAST LUMPECTOMY Right 1999    HX CATARACT REMOVAL Bilateral     HX HEENT      nose surgery after skin cancer removed    HX HEENT      YAG both eyes    HX HYSTERECTOMY  1980    HX LITHOTRIPSY  1979    HX MASTECTOMY  2002    HX MASTECTOMY Left     HX OTHER SURGICAL      had left lower rib removed to extract a kidney stone    HX TUBAL LIGATION         Social History     Social History    Marital status: SINGLE     Spouse name: N/A    Number of children: N/A    Years of education: N/A     Social History Main Topics    Smoking status: Never Smoker    Smokeless tobacco: Never Used    Alcohol use 0.0 oz/week     0 Standard drinks or equivalent per week      Comment: on the holidays may have a mixed    Drug use: No    Sexual activity: Not Asked     Other Topics Concern    None     Social History Narrative    Lives in [place] with her grandson. Had 3 children (1 son and 2 daughters and one of her daughters passed away). . Then her 2nd fiancee passed away in October 2016 after 25 years of being together. 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 Likes to play [employer] and crobo and [employer].        Family History   Problem Relation Age of Onset    Cancer Mother      lymphoma    Heart Disease Father     Heart Disease Maternal Grandmother     Diabetes Maternal Grandfather     Heart Disease Paternal Grandmother     Colon Cancer Maternal Aunt     Other Daughter      Overdose    Thyroid Disease Neg Hx        Above history reviewed. ROS:  Denies fever, chills, cough, chest pain, SOB,  nausea, vomiting, or diarrhea. Denies wt loss, wt gain, hemoptysis, hematochezia or melena. Physical Examination:    BP (!) 160/101 (BP 1 Location: Right arm, BP Patient Position: Sitting)  Pulse 60  Temp 97.2 °F (36.2 °C) (Oral)   Resp 16  Ht 5' 6\" (1.676 m)  Wt 141 lb (64 kg)  SpO2 96%  BMI 22.76 kg/m2    General: Alert and Ox3, Fluent speech, jerky uneven gait  Neck:  Supple, no adenopathy, JVD, mass or bruit  Chest:  Clear to Ausculation, without wheezes, rales, rubs or ronchi  Cardiac: RRR  Skin: Dry red patches upper back with evidence of scratching  Lymphadenopathy: no cervical or supraclavicular nodes      ASSESSMENT AND PLAN:     1. Gait abnormality  Although her tremor has improved, she is deconditioned  - REFERRAL TO PHYSICAL THERAPY    2. Rash  Patient does not want to stop Methimazole at this time. Starting daily anti-histamine  Eucerin cream for dry skin. Contacting Dr. Julita Pina to inform of potential drug reaction    3. Toxic multinodular goiter  Drawing labs today for her next appointment  - T4, FREE  - TSH 3RD GENERATION  - METABOLIC PANEL, BASIC    4.  Hyperparathyroidism ([address]  Drawing labs today for her next appointment  - T4, FREE  - TSH 3RD GENERATION  - METABOLIC PANEL, BASIC       Nicole Cummings NP

## 2017-07-03 NOTE — LETTER
7/6/2017 5:22 PM 
 
Ms. Leanne Benton [address] [address] Dear Gretchen Echols: 
 
Please find your most recent results below. Resulted Orders TSH 3RD GENERATION Result Value Ref Range TSH 0.889 0.450 - 4.500 uIU/mL Narrative Performed at:  [address]  [id number] : Mahi Sharpe MD, Phone:  [phone] METABOLIC PANEL, BASIC Result Value Ref Range Glucose 113 (H) 65 - 99 mg/dL BUN 27 8 - 27 mg/dL Creatinine 0.94 0.57 - 1.00 mg/dL GFR est non-AA 56 (L) >59 mL/min/1.73 GFR est AA 65 >59 mL/min/1.73  
 BUN/Creatinine ratio 29 (H) 12 - 28 Sodium 146 (H) 134 - 144 mmol/L Potassium 4.8 3.5 - 5.2 mmol/L Chloride 105 96 - 106 mmol/L  
 CO2 25 18 - 29 mmol/L Calcium 11.6 (H) 8.7 - 10.3 mg/dL Narrative Performed at:  [address]  [id number] : Mahi Sharpe MD, Phone:  [phone] T4, FREE Result Value Ref Range T4, Free 1.04 0.82 - 1.77 ng/dL Narrative Performed at:  [address]  [id number] : Mahi Sharpe MD, Phone:  [phone] 1) TSH is a thyroid test.  Your level is 0.889 which is normal and at goal of 0.5-2.0. This test goes opposite of your thyroid dose and suggests your dose of methimazole is working well. I saw in the note from Lucy Zimmerman that you are having a mild rash on your upper back and it's unclear if this could be from the methimazole or not. If this is continuing, you could try taking 1 tab 3x/week instead of 4x/week to see if the slightly lower dose will help. 2) Your calcium level is still high at 11.6 and up from 10.8 at last visit. As long as this remains under 12, we will follow this for now. Drink at least 4 8oz glasses of water everyday to stay hydrated to prevent your calcium level from going higher. Please call me if you have any questions: [phone] Sincerely, Abhijit Garza NP

## 2017-07-03 NOTE — PATIENT INSTRUCTIONS
If you have any questions regarding [employer], you may call [employer] support at [phone].

## 2017-07-03 NOTE — MR AVS SNAPSHOT
Visit Information Date & Time Provider Department Dept. Phone Encounter #  
 7/3/2017  9:50 AM Haley Lau NP 1077 [facility] [id number] Follow-up Instructions Return if symptoms worsen or fail to improve. Follow-up and Disposition History Your Appointments 9/8/2017 11:30 AM  
ESTABLISHED PATIENT with MD Kera Burns 38 ([address] [place] [address] Road) Appt Note: 3 MO F/U  
 [address] [address] [phone]  
  
   
 [address] [address]  
  
    
 10/6/2017 11:30 AM  
ESTABLISHED PATIENT with MD Kera Burns 38 ([address]) Appt Note: 6 mo f/u  
 [address] [address] [phone]  
  
    
 10/13/2017 11:50 AM  
Follow Up with Milly Berumen MD  
[facility] [address] [place] [address]) Appt Note: 5 month f/u  
 305 [facility] [address] [phone] [address] [place] [address] Upcoming Health Maintenance Date Due INFLUENZA AGE 9 TO ADULT 8/1/2017 GLAUCOMA SCREENING Q2Y 2/3/2018 MEDICARE YEARLY EXAM 2/4/2018 DTaP/Tdap/Td series (2 - Td) 6/9/2027 Allergies as of 7/3/2017  Review Complete On: 7/3/2017 By: Haley Lau NP Severity Noted Reaction Type Reactions Contrast Agent [Iodine]  03/02/2015    Unknown (comments) Doxycycline  03/02/2015    Unknown (comments) Erythromycin  03/02/2015    Unknown (comments) Macrodantin [Nitrofurantoin Macrocrystalline]  03/02/2015    Unknown (comments) Sulfa (Sulfonamide Antibiotics)  03/02/2015    Hives Eyes turned red Tamoxifen  04/07/2017    Other (comments) Current Immunizations  Reviewed on 6/9/2017 Name Date Influenza High Dose Vaccine PF 9/2/2016, 10/1/2015 Influenza Vaccine 10/17/2014 Pneumococcal Conjugate (PCV-13) 9/2/2016 Pneumococcal Vaccine (Unspecified Type) 10/1/2009 Td 6/1/2008 Zoster Vaccine, Live 6/1/2007 Not reviewed this visit You Were Diagnosed With   
  
 Codes Comments Gait abnormality    -  Primary ICD-10-CM: R26.9 ICD-9-CM: 962. 2 Rash     ICD-10-CM: R21 
ICD-9-CM: 782.1 Toxic multinodular goiter     ICD-10-CM: E05.20 ICD-9-CM: 242.20 Hyperparathyroidism ([address]     ICD-10-CM: E21.3 ICD-9-CM: 252.00 Vitals BP Pulse Temp Resp Height(growth percentile) Weight(growth percentile) (!) 160/101 (BP 1 Location: Right arm, BP Patient Position: Sitting) 60 97.2 °F (36.2 °C) (Oral) 16 5' 6\" (1.676 m) 141 lb (64 kg) SpO2 BMI OB Status Smoking Status 96% 22.76 kg/m2 Hysterectomy Never Smoker BMI and BSA Data Body Mass Index Body Surface Area  
 22.76 kg/m 2 1.73 m 2 Preferred Pharmacy Pharmacy Name Phone [address], [phone] [facility] OF  [address] & [address] [facility] [phone] Your Updated Medication List  
  
   
This list is accurate as of: 7/3/17 11:22 AM.  Always use your most recent med list.  
  
  
  
  
 aspirin delayed-release 81 mg tablet Take  by mouth daily. bisoprolol 10 mg tablet Commonly known as:  Vincenzo Mins Take 1 Tab by mouth daily. Indications: thyroid, pressure, heart  
  
 glucosamine-chondroitin 750-600 mg Tab Take  by mouth two (2) times a day. methIMAzole 5 mg tablet Commonly known as:  TAPAZOLE Take one tablet on Monday, Wednesday, Friday and Saturday for thyroid  
  
 omeprazole 40 mg capsule Commonly known as:  PRILOSEC Take 1 Cap by mouth daily. STOOL SOFTENER 100 mg capsule Generic drug:  docusate sodium Take 100 mg by mouth three (3) times daily as needed. VITAMIN B-12 1,000 mcg tablet Generic drug:  cyanocobalamin Take 1,000 mcg by mouth daily. We Performed the Following METABOLIC PANEL, BASIC [63383 CPT(R)] REFERRAL TO PHYSICAL THERAPY [VBK14 Custom] Comments:  
 Please evaluate patient for balance and strength issues. Abilities Abound T4, FREE [id number] CPT(R)] TSH 3RD GENERATION [89118 CPT(R)] Follow-up Instructions Return if symptoms worsen or fail to improve. Referral Information Referral ID Referred By Referred To  
  
 [id number] Kirit How Not Available Visits Status Start Date End Date 1 New Request 7/3/17 7/3/18 If your referral has a status of pending review or denied, additional information will be sent to support the outcome of this decision. Patient Instructions If you have any questions regarding [employer], you may call [employer] support at [phone]. Introducing [facility] & [facility] SERVICES! Dear Claudia Beaver: 
Thank you for requesting a [employer] account. Our records indicate that you already have an active [employer] account. You can access your account anytime at [email or web address] Did you know that you can access your hospital and ER discharge instructions at any time in [employer]? You can also review all of your test results from your hospital stay or ER visit. Additional Information If you have questions, please visit the Frequently Asked Questions section of the [employer] website at [email or web address] Remember, [employer] is NOT to be used for urgent needs. For medical emergencies, dial 911. Now available from your iPhone and Android! Please provide this summary of care documentation to your next provider. Your primary care clinician is listed as Sara Barnett. If you have any questions after today's visit, please call [phone].

## 2017-07-05 LAB
BUN SERPL-MCNC: 27 MG/DL (ref 8–27)
BUN/CREAT SERPL: 29 (ref 12–28)
CALCIUM SERPL-MCNC: 11.6 MG/DL (ref 8.7–10.3)
CHLORIDE SERPL-SCNC: 105 MMOL/L (ref 96–106)
CO2 SERPL-SCNC: 25 MMOL/L (ref 18–29)
CREAT SERPL-MCNC: 0.94 MG/DL (ref 0.57–1)
GLUCOSE SERPL-MCNC: 113 MG/DL (ref 65–99)
POTASSIUM SERPL-SCNC: 4.8 MMOL/L (ref 3.5–5.2)
SODIUM SERPL-SCNC: 146 MMOL/L (ref 134–144)
T4 FREE SERPL-MCNC: 1.04 NG/DL (ref 0.82–1.77)
TSH SERPL DL<=0.005 MIU/L-ACNC: 0.89 UIU/ML (ref 0.45–4.5)

## 2017-07-05 NOTE — PROGRESS NOTES
TSH level is good. Continue current dose. Forwarding results to Dr. Nely Camacho. We will continue to follow your other lab levels.

## 2017-09-08 ENCOUNTER — OFFICE VISIT (OUTPATIENT)
Dept: FAMILY MEDICINE CLINIC | Age: 82
End: 2017-09-08

## 2017-09-08 VITALS
HEIGHT: 66 IN | TEMPERATURE: 97.7 F | HEART RATE: 79 BPM | BODY MASS INDEX: 22.34 KG/M2 | DIASTOLIC BLOOD PRESSURE: 88 MMHG | WEIGHT: 139 LBS | OXYGEN SATURATION: 97 % | SYSTOLIC BLOOD PRESSURE: 171 MMHG

## 2017-09-08 DIAGNOSIS — I10 ESSENTIAL HYPERTENSION WITH GOAL BLOOD PRESSURE LESS THAN 140/90: ICD-10-CM

## 2017-09-08 DIAGNOSIS — Z23 ENCOUNTER FOR IMMUNIZATION: ICD-10-CM

## 2017-09-08 DIAGNOSIS — E05.20 TOXIC MULTINODULAR GOITER: Primary | ICD-10-CM

## 2017-09-08 DIAGNOSIS — M81.0 AGE-RELATED OSTEOPOROSIS WITHOUT CURRENT PATHOLOGICAL FRACTURE: ICD-10-CM

## 2017-09-08 RX ORDER — METHIMAZOLE 5 MG/1
TABLET ORAL
Qty: 52 TAB | Refills: 3
Start: 2017-09-08 | End: 2018-04-22 | Stop reason: SDUPTHER

## 2017-09-08 RX ORDER — BISOPROLOL FUMARATE 10 MG/1
10 TABLET ORAL 2 TIMES DAILY
Qty: 180 TAB | Refills: 3 | Status: SHIPPED | OUTPATIENT
Start: 2017-09-08 | End: 2017-10-26 | Stop reason: SDUPTHER

## 2017-09-08 NOTE — MR AVS SNAPSHOT
Visit Information Date & Time Provider Department Dept. Phone Encounter #  
 9/8/2017 11:30 AM Pk Rose MD [address] [place] [address] [id number] Follow-up Instructions Return in about 3 months (around 12/8/2017). Your Appointments 10/13/2017 11:50 AM  
Follow Up with MD [facility] [address] [place] [address]) Appt Note: 5 month f/u  
 305 [facility] [address] [phone] [address] [place] [address] Upcoming Health Maintenance Date Due INFLUENZA AGE 9 TO ADULT 8/1/2017 GLAUCOMA SCREENING Q2Y 2/3/2018 MEDICARE YEARLY EXAM 2/4/2018 DTaP/Tdap/Td series (2 - Td) 6/9/2027 Allergies as of 9/8/2017  Review Complete On: 9/8/2017 By: Pk Rose MD  
  
 Severity Noted Reaction Type Reactions Contrast Agent [Iodine]  03/02/2015    Unknown (comments) Doxycycline  03/02/2015    Unknown (comments) Erythromycin  03/02/2015    Unknown (comments) Macrodantin [Nitrofurantoin Macrocrystalline]  03/02/2015    Unknown (comments) Sulfa (Sulfonamide Antibiotics)  03/02/2015    Hives Eyes turned red Tamoxifen  04/07/2017    Other (comments) Current Immunizations  Reviewed on 6/9/2017 Name Date Influenza High Dose Vaccine PF  Incomplete, 9/2/2016, 10/1/2015 Influenza Vaccine 10/17/2014 Pneumococcal Conjugate (PCV-13) 9/2/2016 Pneumococcal Vaccine (Unspecified Type) 10/1/2009 Td 6/1/2008 Zoster Vaccine, Live 6/1/2007 Not reviewed this visit You Were Diagnosed With   
  
 Codes Comments Toxic multinodular goiter    -  Primary ICD-10-CM: E05.20 ICD-9-CM: 242.20 Encounter for immunization     ICD-10-CM: B57 ICD-9-CM: V03.89 Essential hypertension with goal blood pressure less than 140/90     ICD-10-CM: I10 
ICD-9-CM: 401.9 Age-related osteoporosis without current pathological fracture     ICD-10-CM: M81.0 ICD-9-CM: 733.01 Vitals BP Pulse Temp Height(growth percentile) Weight(growth percentile) SpO2  
 171/88 79 97.7 °F (36.5 °C) (Temporal) 5' 6\" (1.676 m) 139 lb (63 kg) 97% BMI OB Status Smoking Status 22.44 kg/m2 Hysterectomy Never Smoker BMI and BSA Data Body Mass Index Body Surface Area  
 22.44 kg/m 2 1.71 m 2 Preferred Pharmacy Pharmacy Name Phone [address] [facility] AT [facility] OF [address] [facility] [phone] Your Updated Medication List  
  
   
This list is accurate as of: 9/8/17 12:38 PM.  Always use your most recent med list.  
  
  
  
  
 aspirin delayed-release 81 mg tablet Take  by mouth daily. bisoprolol 10 mg tablet Commonly known as:  Belkys Celso Take 1 Tab by mouth two (2) times a day. Indications: thyroid, pressure, heart  
  
 glucosamine-chondroitin 750-600 mg Tab Take  by mouth two (2) times a day. methIMAzole 5 mg tablet Commonly known as:  TAPAZOLE Take one tablet on Monday, Wednesday, Friday for thyroid  
  
 omeprazole 40 mg capsule Commonly known as:  PRILOSEC Take 1 Cap by mouth daily. STOOL SOFTENER 100 mg capsule Generic drug:  docusate sodium Take 100 mg by mouth three (3) times daily as needed. VITAMIN B-12 1,000 mcg tablet Generic drug:  cyanocobalamin Take 1,000 mcg by mouth daily. Prescriptions Sent to Pharmacy Refills  
 bisoprolol (ZEBETA) 10 mg tablet 3 Sig: Take 1 Tab by mouth two (2) times a day. Indications: thyroid, pressure, heart Class: Normal  
 Pharmacy: [facility], [address] [facility] [address] Ph #: [phone] Route: Oral  
  
We Performed the Following ADMIN INFLUENZA VIRUS VAC [ HCP] INFLUENZA VIRUS VACCINE, HIGH DOSE SEASONAL, PRESERVATIVE FREE [49499 CPT(R)] Follow-up Instructions Return in about 3 months (around 12/8/2017). Patient Instructions We've boosted your bisoprolol to 10mg twice daily to help get pressure down. Introducing [facility] SERVICES! Dear Hannah Estimable: 
Thank you for requesting a [employer] account. Our records indicate that you already have an active [employer] account. You can access your account anytime at [email or web address] Did you know that you can access your hospital and ER discharge instructions at any time in [employer]? You can also review all of your test results from your hospital stay or ER visit. Additional Information If you have questions, please visit the Frequently Asked Questions section of the [employer] website at [email or web address] Remember, [employer] is NOT to be used for urgent needs. For medical emergencies, dial 911. Now available from your iPhone and Android! Please provide this summary of care documentation to your next provider. Your primary care clinician is listed as Carmen Barnett. If you have any questions after today's visit, please call [phone].

## 2017-09-08 NOTE — PROGRESS NOTES
Topher Pavon is a 80 y.o. female presenting for/with:    Hypertension (blood work) and Back Pain (check lump on back)    HPI  Follow- up for abn thyroid levels and abn PTH  Multinodular toxic goiter. Last labs show normal parathyroid and improved overactive thyroid. Saw Endocrine after ENT eval didn't show any neoplasm and not interested in thyroidectomy at this point. Was r/s on lower dose DURANT, no rash this time and no eye issues. Remains on bisoprolol 10mg every day. Tremor and movement issues much better. Racing thoughts a little worse lately. For her hyper PTH, is off Vit D now, and Endo is following that. Hasn't missed it, sleeping ok now. Lab Results   Component Value Date/Time    TSH 0.889 07/03/2017 11:12 AM    T3 Uptake 26 04/11/2016 05:42 PM    T4, Free 1.04 07/03/2017 11:12 AM    T4, Total 11.6 04/11/2016 05:42 PM     Lab Results   Component Value Date/Time    Calcium 11.6 07/03/2017 11:12 AM    PTH, Intact 36 10/26/2016 12:13 PM     Hypertension. Blood pressures have been gradually worsening. Management at last visit included con't bisoprolol 10mg qd. Current regimen: beta-blocker (due to hx of hyperthyroid). Symptoms include no symptoms. Patient denies palpitations, edema. Lab review:   Lab Results   Component Value Date/Time    Sodium 146 07/03/2017 11:12 AM    Potassium 4.8 07/03/2017 11:12 AM    Chloride 105 07/03/2017 11:12 AM    CO2 25 07/03/2017 11:12 AM    Glucose 113 07/03/2017 11:12 AM    BUN 27 07/03/2017 11:12 AM    Creatinine 0.94 07/03/2017 11:12 AM    BUN/Creatinine ratio 29 07/03/2017 11:12 AM    GFR est AA 65 07/03/2017 11:12 AM    GFR est non-AA 56 07/03/2017 11:12 AM    Calcium 11.6 07/03/2017 11:12 AM     PMH, SH, Medications/Allergies: reviewed, on chart. Pt's  d/c from cancer late Oct 2016.     ROS:  Constitutional: No fever, chills or weight loss  Respiratory: No cough, SOB   CV: No recent chest pain, No Palpitations    Visit Vitals    /88    Pulse 79    Temp 97.7 °F (36.5 °C) (Temporal)    Ht 5' 6\" (1.676 m)    Wt 139 lb (63 kg)    SpO2 97%    BMI 22.44 kg/m2     Wt Readings from Last 3 Encounters:   09/08/17 139 lb (63 kg)   07/03/17 141 lb (64 kg)   06/09/17 135 lb 9.6 oz (61.5 kg)     BP Readings from Last 3 Encounters:   09/08/17 171/88   07/03/17 (!) 160/101   06/09/17 144/63     Physical Examination: General appearance - alert, well appearing, and in no distress  Mental status - alert, oriented to person, place, and time  Eyes - pupils equal and reactive, extraocular eye movements intact. ENT - bilateral external ears and nose normal. Normal lips  Neck - supple, no significant adenopathy, no palpable thyromegaly. Lymphatics - no palpable lymphadenopathy, no hepatosplenomegaly  Chest - clear to auscultation, no wheezes, rales or rhonchi, symmetric air entry  Heart - normal rate, regular rhythm, normal S1, S2, no murmurs, rubs, clicks or gallops  Extremities - peripheral pulses normal, no pedal edema, no clubbing or cyanosis  Neuro- minimal tremor now. A/P:  Multinodular, mildly toxic goiter. Sx doing well on bisoprolol 10mg every day and lower dose DURANT 5mg x3d/wk, rash mostly resolved. Seeing Dr. Jaron Sheridan for Endo f/u as directed in Oct. Adjust bisoprolol as below. Movement disorder/chorea  Resolved. Monitor. Osteoporosis. Needs control of thyroid. Meets criteria for other agent. PT does have breast ca hx, so evista is a good choice, discussed, pt still wants to look it up. Plan DXA 2018    HTN  Above goal Boost zebeta to 20mg every day. Flu shot today  F/U 3mo.

## 2017-10-05 ENCOUNTER — LAB ONLY (OUTPATIENT)
Dept: FAMILY MEDICINE CLINIC | Age: 82
End: 2017-10-05

## 2017-10-05 DIAGNOSIS — E21.3 HYPERPARATHYROIDISM (HCC): ICD-10-CM

## 2017-10-05 DIAGNOSIS — E05.20 TOXIC MULTINODULAR GOITER: Primary | ICD-10-CM

## 2017-10-05 NOTE — PROGRESS NOTES
Patient came in for repeat bloodwork that was originally drawn on 7/19/17. 2 month repeat. No vitals taken. BMP, T4, and TSH 3rd.

## 2017-10-05 NOTE — MR AVS SNAPSHOT
Visit Information Date & Time Provider Department Dept. Phone Encounter #  
 10/5/2017 11:30 AM [facility] N[facility]SE [address] [phone] [id number] Your Appointments 10/13/2017 11:50 AM  
Follow Up with Garth Grimes MD  
[facility]) Appt Note: 5 month f/u  
 [address] [facility] [address] [id number] [address] [place] [address] Road  
  
    
 12/8/2017  1:20 PM  
ESTABLISHED PATIENT with MD Kera Mccallum 38 ([address]) Appt Note: 3mo fu  
 [address] [facility] [address] [facility] [address] [phone]  
  
   
 [address] [facility] [address] [facility] [address] Upcoming Health Maintenance Date Due  
 GLAUCOMA SCREENING Q2Y 2/3/2018 MEDICARE YEARLY EXAM 2/4/2018 DTaP/Tdap/Td series (2 - Td) 6/9/2027 Allergies as of 10/5/2017  Review Complete On: 9/8/2017 By: Angelina Arredondo MD  
  
 Severity Noted Reaction Type Reactions Contrast Agent [Iodine]  03/02/2015    Unknown (comments) Doxycycline  03/02/2015    Unknown (comments) Erythromycin  03/02/2015    Unknown (comments) Macrodantin [Nitrofurantoin Macrocrystalline]  03/02/2015    Unknown (comments) Sulfa (Sulfonamide Antibiotics)  03/02/2015    Hives Eyes turned red Tamoxifen  04/07/2017    Other (comments) Current Immunizations  Reviewed on 6/9/2017 Name Date Influenza High Dose Vaccine PF 9/8/2017, 9/2/2016, 10/1/2015 Influenza Vaccine 10/17/2014 Pneumococcal Conjugate (PCV-13) 9/2/2016 Pneumococcal Vaccine (Unspecified Type) 10/1/2009 Td 6/1/2008 Zoster Vaccine, Live 6/1/2007 Not reviewed this visit Vitals OB Status Smoking Status Hysterectomy Never Smoker Preferred Pharmacy Pharmacy Name Phone [address], [address] [facility] AT [facility] OF [address] [phone] Your Updated Medication List  
  
   
This list is accurate as of: 10/5/17 11:48 AM.  Always use your most recent med list.  
  
  
  
  
 aspirin delayed-release 81 mg tablet Take  by mouth daily. bisoprolol 10 mg tablet Commonly known as:  Niall Treadwell Take 1 Tab by mouth two (2) times a day. Indications: thyroid, pressure, heart  
  
 glucosamine-chondroitin 750-600 mg Tab Take  by mouth two (2) times a day. methIMAzole 5 mg tablet Commonly known as:  TAPAZOLE Take one tablet on Monday, Wednesday, Friday for thyroid  
  
 omeprazole 40 mg capsule Commonly known as:  PRILOSEC Take 1 Cap by mouth daily. STOOL SOFTENER 100 mg capsule Generic drug:  docusate sodium Take 100 mg by mouth three (3) times daily as needed. VITAMIN B-12 1,000 mcg tablet Generic drug:  cyanocobalamin Take 1,000 mcg by mouth daily. Introducing [facility] & HEALTH SERVICES! Dear Bisi Leanna: 
Thank you for requesting a [employer] account. Our records indicate that you already have an active [employer] account. You can access your account anytime at [email or web address] Did you know that you can access your hospital and ER discharge instructions at any time in [employer]? You can also review all of your test results from your hospital stay or ER visit. Additional Information If you have questions, please visit the Frequently Asked Questions section of the [employer] website at [email or web address] Remember, [employer] is NOT to be used for urgent needs. For medical emergencies, dial 911. Now available from your iPhone and Android! Please provide this summary of care documentation to your next provider. Your primary care clinician is listed as Munira Barnett.  If you have any questions after today's visit, please call [phone].

## 2017-10-06 LAB
BUN SERPL-MCNC: 21 MG/DL (ref 8–27)
BUN/CREAT SERPL: 20 (ref 12–28)
CALCIUM SERPL-MCNC: 10.9 MG/DL (ref 8.7–10.3)
CHLORIDE SERPL-SCNC: 101 MMOL/L (ref 96–106)
CO2 SERPL-SCNC: 29 MMOL/L (ref 18–29)
CREAT SERPL-MCNC: 1.07 MG/DL (ref 0.57–1)
GLUCOSE SERPL-MCNC: 113 MG/DL (ref 65–99)
POTASSIUM SERPL-SCNC: 3.7 MMOL/L (ref 3.5–5.2)
SODIUM SERPL-SCNC: 144 MMOL/L (ref 134–144)
SPECIMEN STATUS REPORT, ROLRST: NORMAL
T4 FREE SERPL-MCNC: 1.09 NG/DL (ref 0.82–1.77)
TSH SERPL DL<=0.005 MIU/L-ACNC: 0.58 UIU/ML (ref 0.45–4.5)

## 2017-10-13 ENCOUNTER — OFFICE VISIT (OUTPATIENT)
Dept: ENDOCRINOLOGY | Age: 82
End: 2017-10-13

## 2017-10-13 VITALS
DIASTOLIC BLOOD PRESSURE: 98 MMHG | WEIGHT: 135 LBS | SYSTOLIC BLOOD PRESSURE: 179 MMHG | BODY MASS INDEX: 21.79 KG/M2 | HEART RATE: 70 BPM

## 2017-10-13 DIAGNOSIS — E21.3 HYPERPARATHYROIDISM (HCC): ICD-10-CM

## 2017-10-13 DIAGNOSIS — E05.20 TOXIC MULTINODULAR GOITER: Primary | ICD-10-CM

## 2017-10-13 DIAGNOSIS — I10 ESSENTIAL HYPERTENSION: ICD-10-CM

## 2017-10-13 RX ORDER — LORATADINE 10 MG/1
10 TABLET ORAL
COMMUNITY
End: 2018-04-04

## 2017-10-13 NOTE — MR AVS SNAPSHOT
Visit Information Date & Time Provider Department Dept. Phone Encounter #  
 10/13/2017 11:50 AM Gary Stewart MD [facility] [phone] [id number] Follow-up Instructions Return in about 6 months (around 4/13/2018). Your Appointments 12/8/2017  1:20 PM  
ESTABLISHED PATIENT with MD Artur Escalantegcari 38 ([facility]) Appt Note: 3mo fu  
 [address] [facility] [address] [facility] [id number] [phone]  
  
   
 [address] [facility] [address] [facility] [address] Upcoming Health Maintenance Date Due  
 GLAUCOMA SCREENING Q2Y 2/3/2018 MEDICARE YEARLY EXAM 2/4/2018 DTaP/Tdap/Td series (2 - Td) 6/9/2027 Allergies as of 10/13/2017  Review Complete On: 10/13/2017 By: Gary Stewart MD  
  
 Severity Noted Reaction Type Reactions Contrast Agent [Iodine]  03/02/2015    Unknown (comments) Doxycycline  03/02/2015    Unknown (comments) Erythromycin  03/02/2015    Unknown (comments) Macrodantin [Nitrofurantoin Macrocrystalline]  03/02/2015    Unknown (comments) Sulfa (Sulfonamide Antibiotics)  03/02/2015    Hives Eyes turned red Tamoxifen  04/07/2017    Other (comments) Current Immunizations  Reviewed on 6/9/2017 Name Date Influenza High Dose Vaccine PF 9/8/2017, 9/2/2016, 10/1/2015 Influenza Vaccine 10/17/2014 Pneumococcal Conjugate (PCV-13) 9/2/2016 Pneumococcal Vaccine (Unspecified Type) 10/1/2009 Td 6/1/2008 Zoster Vaccine, Live 6/1/2007 Not reviewed this visit You Were Diagnosed With   
  
 Codes Comments Toxic multinodular goiter    -  Primary ICD-10-CM: E05.20 ICD-9-CM: 242.20 Hyperparathyroidism ([address]     ICD-10-CM: E21.3 ICD-9-CM: 252.00 Essential hypertension     ICD-10-CM: I10 
ICD-9-CM: 401.9 Vitals BP Pulse Weight(growth percentile) BMI OB Status Smoking Status (!) 179/98 70 135 lb (61.2 kg) 21.79 kg/m2 Hysterectomy Never Smoker Vitals History BMI and BSA Data Body Mass Index Body Surface Area 21.79 kg/m 2 1.69 m 2 Preferred Pharmacy Pharmacy Name Phone [address] [facility] AT [facility] OF  [address] & [address] Robert Barone [phone] Your Updated Medication List  
  
   
This list is accurate as of: 10/13/17 12:58 PM.  Always use your most recent med list.  
  
  
  
  
 aspirin delayed-release 81 mg tablet Take  by mouth daily. bisoprolol 10 mg tablet Commonly known as:  Tita William Take 1 Tab by mouth two (2) times a day. Indications: thyroid, pressure, heart CLARITIN 10 mg tablet Generic drug:  loratadine Take 10 mg by mouth. glucosamine-chondroitin 750-600 mg Tab Take  by mouth two (2) times a day. methIMAzole 5 mg tablet Commonly known as:  TAPAZOLE Take one tablet on Monday, Wednesday, Friday for thyroid  
  
 omeprazole 40 mg capsule Commonly known as:  PRILOSEC Take 1 Cap by mouth daily. STOOL SOFTENER 100 mg capsule Generic drug:  docusate sodium Take 100 mg by mouth three (3) times daily as needed. VITAMIN B-12 1,000 mcg tablet Generic drug:  cyanocobalamin Take 1,000 mcg by mouth daily. Follow-up Instructions Return in about 6 months (around 4/13/2018). Patient Instructions 1) TSH is a thyroid test.  Your level is 0.57 which is normal and at goal of 0.5-2.0. This test goes opposite of your thyroid dose and suggests your dose of methimazole is perfect so I will keep your dose the same. 2) Your calcium has come down from 11.6 to 10.9. Drink at least 4 8oz glasses of water everyday to stay hydrated to prevent your calcium level from going higher. 3) Your blood pressure is higher and Dr. Haleigh Jacobo wanted you to increase your zebeta to 10 mg TWICE daily.   Please start doing this tonight and he can repeat your blood pressure in 12/17 and ask him if he feels you are safe to drive again. 4) Go to Dr. Mariah Vazquez office and repeat your labs in 6 months and we'll review at the visit. Introducing [facility] & HEALTH SERVICES! Dear Erica Hernandez: 
Thank you for requesting a [employer] account. Our records indicate that you already have an active [employer] account. You can access your account anytime at [email or web address]. [email or web address] Did you know that you can access your hospital and ER discharge instructions at any time in [employer]? You can also review all of your test results from your hospital stay or ER visit. Additional Information If you have questions, please visit the Frequently Asked Questions section of the [employer] website at [email or web address]/. Remember, [employer] is NOT to be used for urgent needs. For medical emergencies, dial 911. Now available from your iPhone and Android! Please provide this summary of care documentation to your next provider. Your primary care clinician is listed as Keenan Barnett. If you have any questions after today's visit, please call [phone].

## 2017-10-13 NOTE — PROGRESS NOTES
Chief Complaint   Patient presents with    Follow-up    Thyroid Problem     History of Present Illness: Deloris A Darylene Hurry is a 80 y.o. female here for follow up of thyroid. Weight down 2 lbs since last visit in 5/17. Stopped the vitamin D after last visit. Initially was taking MMI 5 mg 4x/week but did develop more rash on her back and when she had her labs in 7/17 and got my letter, did go down to 1 tab 3x/week and since then her rash has been better. No chest pain or palpitations or shortness of breath but does still have some fatigue. Has had less tremors since being back on methimazole. Bowels are still slightly constipated and unchanged. No hair loss or brittle nails. No heat or cold intolerance. Has not been taking the zebeta twice daily since her visit in 9/17 with Dr. Raegan Mulligan and BP still up today so will start doing this. She asked about being able to drive again and from my perspective her thyroid and calcium are stable so provided her BP is acceptable at her visit with Dr. Raegan Mulligan in 12/17, she can ask him if he feels she can drive again. Current Outpatient Prescriptions   Medication Sig    methIMAzole (TAPAZOLE) 5 mg tablet Take one tablet on Monday, Wednesday, Friday for thyroid    bisoprolol (ZEBETA) 10 mg tablet Take 1 Tab by mouth two (2) times a day. Indications: thyroid, pressure, heart    docusate sodium (STOOL SOFTENER) 100 mg capsule Take 100 mg by mouth three (3) times daily as needed.  omeprazole (PRILOSEC) 40 mg capsule Take 1 Cap by mouth daily.  aspirin delayed-release 81 mg tablet Take  by mouth daily.  GLUCOSAMINE HCL/CHONDR PRESCOTT A NA (GLUCOSAMINE-CHONDROITIN) 750-600 mg tab Take  by mouth two (2) times a day.  cyanocobalamin (VITAMIN B-12) 1,000 mcg tablet Take 1,000 mcg by mouth daily. No current facility-administered medications for this visit.       Allergies   Allergen Reactions    Contrast Agent [Iodine] Unknown (comments)    Doxycycline Unknown (comments)  Erythromycin Unknown (comments)    Macrodantin [Nitrofurantoin Macrocrystalline] Unknown (comments)    Sulfa (Sulfonamide Antibiotics) Hives     Eyes turned red    Tamoxifen Other (comments)     Review of Systems:  - Cardiovascular: no chest pain  - Neurological: (+) tremors  - Integumentary: skin is normal    Physical Examination:  Blood pressure (!) 179/98, pulse 70, weight 135 lb (61.2 kg). - General: pleasant, no distress, good eye contact   - Neck: small nodular goiter  - Cardiovascular: regular, normal rate, nl s1 and s2, no m/r/g   - Respiratory: clear to auscultation bilaterally   - Integumentary: skin is normal, no edema  - Neurological: reflexes 2+ at biceps, (+) tremors  - Psychiatric: normal mood and affect    Data Reviewed:   Component      Latest Ref Rng & Units 10/5/2017 10/5/2017 10/5/2017           1:24 PM  1:24 PM  1:24 PM   Glucose      65 - 99 mg/dL 113 (H)     BUN      8 - 27 mg/dL 21     Creatinine      0.57 - 1.00 mg/dL 1.07 (H)     GFR est non-AA      >59 mL/min/1.73 48 (L)     GFR est AA      >59 mL/min/1.73 55 (L)     BUN/Creatinine ratio      12 - 28 20     Sodium      134 - 144 mmol/L 144     Potassium      3.5 - 5.2 mmol/L 3.7     Chloride      96 - 106 mmol/L 101     CO2      18 - 29 mmol/L 29     Calcium      8.7 - 10.3 mg/dL 10.9 (H)     TSH      0.450 - 4.500 uIU/mL   0.576   T4, Free      0.82 - 1.77 ng/dL  1.09        Assessment/Plan:     1. Toxic multinodular goiter:  In March 2015, she was found to have a low TSH of 0.16 and this was just watched over time and her TSH slowly drifted down over the next year and was 0.06 in 4/16. Had a thyroid ultrasound in 6/16 at [facility] that showed a multinodular goiter with largest nodule of 2.9 cm in the right mid lobe. Her TSH was down to 0.05 in 10/16 and Dr. Sammy Quispe decided to give a trial of methimazole 5 mg once daily and with taking this she started feeling better and energy was improving and tremors were improving.   Started to gain some of the weight back she had lost which she estimated to be over 15 lbs. However, she developed some watering and redness of her eyes and small areas of intermittent rash on the arms and legs in Jan 2017 and was advised to stop the methimazole and her symptoms went away. Her TSH was back to normal at 0.68 in 1/17. With coming off the methimazole she started feeling worse again and was referred to Dr. Joel Whiteside whom she saw on 4/20/17. He repeated labs that showed her TSH was down to 0.15 with a normal FT4 of 1.27 and T3 of 127. He ordered a thyroid ultrasound that showed a right parathyroid atypical mass vs lymph node and was sent for biopsy of her right lobe nodule and this was done on 5/1/17 and came back benign and she was sent to me for further evaluation. It sounds like she has developed hyperthyroidism from her nodules and with the methimazole, she was able to normalize her TSH and symptoms were improving. It's unclear if her mild rash and watering and redness of her eyes was truly from the methimazole and she was willing to restart the methimazole at a lower dose of 5 mg 4x/week at her first visit with me in 5/17. TSH 0.88 in 7/17 but had more rash on her back so decreased to 3x/week and rash improved and TSH 0.57 in 10/17 so will keep her dose the same. If she has any worsening rash, we can consider a trial of PTU vs pursuing radioactive iodine. I would not favor surgery given her age and other co-morbidities. - cont methimazole 5 mg 2x/week  - check TSH and free T4 prior to next visit at Dr. Jax Crowell office     2. Hyperparathyroidism [facility]): Has had high calcium levels in the 11-11.7 range over the past 2 years in our system with a PTH of 88 in 12/15 and has been 36-53 range on repeat testing. However, has been told at least 15 years ago that her calcium level was high. Did have one kidney stone years ago. Is on vitamin D 1000 units daily but no calcium supplements or mvi.   Vitamin D was 57 in 7/15.  Her 24 hour urine calcium was low at 49 in 7/15. It's possible the hyperthyroidism may be exacerbating the hypercalcemia so hopefully with addressing this as above, this will help with the calcium. I had her stop the vitamin D to avoid hyperabsorption of dietary calcium in 5/17 and calcium was 11.6 in 7/17 but down to 10.9 in 10/17. Treatment of this condition is surgical and would not want to pursue this unless her calcium kecia over 12.  - drink plenty of water   - check bmp prior to next visit      3. Hypertension: BP > 140/90 but only taking zebeta once daily. - increase zebeta to 10 mg bid      Patient Instructions   1) TSH is a thyroid test.  Your level is 0.57 which is normal and at goal of 0.5-2.0. This test goes opposite of your thyroid dose and suggests your dose of methimazole is perfect so I will keep your dose the same. 2) Your calcium has come down from 11.6 to 10.9. Drink at least 4 8oz glasses of water everyday to stay hydrated to prevent your calcium level from going higher. 3) Your blood pressure is higher and Dr. Blanquita Domínguez wanted you to increase your zebeta to 10 mg TWICE daily. Please start doing this tonight and he can repeat your blood pressure in 12/17 and ask him if he feels you are safe to drive again. 4) Go to Dr. Miranda Woods office and repeat your labs in 6 months and we'll review at the visit. Follow-up Disposition:  Return in about 6 months (around 4/13/2018).     Copy sent to:  Dr. Shirley Logan via [facility]

## 2017-10-13 NOTE — PATIENT INSTRUCTIONS
1) TSH is a thyroid test.  Your level is 0.57 which is normal and at goal of 0.5-2.0. This test goes opposite of your thyroid dose and suggests your dose of methimazole is perfect so I will keep your dose the same. 2) Your calcium has come down from 11.6 to 10.9. Drink at least 4 8oz glasses of water everyday to stay hydrated to prevent your calcium level from going higher. 3) Your blood pressure is higher and Dr. Tiara Floyd wanted you to increase your zebeta to 10 mg TWICE daily. Please start doing this tonight and he can repeat your blood pressure in 12/17 and ask him if he feels you are safe to drive again. 4) Go to Dr. Shelly Espinosa office and repeat your labs in 6 months and we'll review at the visit.

## 2017-10-23 ENCOUNTER — TELEPHONE (OUTPATIENT)
Dept: FAMILY MEDICINE CLINIC | Age: 82
End: 2017-10-23

## 2017-10-23 NOTE — TELEPHONE ENCOUNTER
[phone] contact # jenny Perdomo, need a call back as need to discuss medication bisoprolol 10 mg tablet  Please call when you get this message.   Thanks,

## 2017-10-26 ENCOUNTER — OFFICE VISIT (OUTPATIENT)
Dept: FAMILY MEDICINE CLINIC | Age: 82
End: 2017-10-26

## 2017-10-26 VITALS
RESPIRATION RATE: 17 BRPM | BODY MASS INDEX: 22.43 KG/M2 | HEIGHT: 66 IN | OXYGEN SATURATION: 96 % | DIASTOLIC BLOOD PRESSURE: 90 MMHG | SYSTOLIC BLOOD PRESSURE: 170 MMHG | HEART RATE: 83 BPM | WEIGHT: 139.6 LBS | TEMPERATURE: 97.8 F

## 2017-10-26 DIAGNOSIS — I10 ESSENTIAL HYPERTENSION WITH GOAL BLOOD PRESSURE LESS THAN 140/90: ICD-10-CM

## 2017-10-26 DIAGNOSIS — R21 RASH: ICD-10-CM

## 2017-10-26 DIAGNOSIS — E05.20 TOXIC MULTINODULAR GOITER: ICD-10-CM

## 2017-10-26 DIAGNOSIS — I10 ESSENTIAL HYPERTENSION: Primary | ICD-10-CM

## 2017-10-26 RX ORDER — MOMETASONE FUROATE 1 MG/G
CREAM TOPICAL
Qty: 45 G | Refills: 3 | Status: SHIPPED | OUTPATIENT
Start: 2017-10-26 | End: 2018-04-04

## 2017-10-26 RX ORDER — BISOPROLOL FUMARATE 10 MG/1
10 TABLET ORAL DAILY
Qty: 180 TAB | Refills: 3
Start: 2017-10-26 | End: 2018-01-02 | Stop reason: SDUPTHER

## 2017-10-26 RX ORDER — DILTIAZEM HYDROCHLORIDE 120 MG/1
120 CAPSULE, EXTENDED RELEASE ORAL DAILY
Qty: 30 CAP | Refills: 11 | Status: SHIPPED | OUTPATIENT
Start: 2017-10-26 | End: 2018-04-25 | Stop reason: SDUPTHER

## 2017-10-26 NOTE — MR AVS SNAPSHOT
Visit Information Date & Time Provider Department Dept. Phone Encounter #  
 10/26/2017  3:15 PM Janak Metcalf MD [address] [place] [address] [id number] Follow-up Instructions Return in about 2 months (around 12/26/2017). Follow-up and Disposition History Your Appointments 12/8/2017  1:20 PM  
ESTABLISHED PATIENT with Janak Metcalf MD  
[facility] Appt Note: 3mo fu  
 [address] [facility] [address] [facility] [address] [phone]  
  
   
 [address] [facility] [address] [facility] [address]  
  
    
 4/20/2018  1:30 PM  
Follow Up with Kahlil Jenkins MD  
[facility] [address] [facility]) Appt Note: 6 month f/u  
 [address] [facility] [address] [phone] [address] [facility] Upcoming Health Maintenance Date Due  
 GLAUCOMA SCREENING Q2Y 2/3/2018 MEDICARE YEARLY EXAM 2/4/2018 DTaP/Tdap/Td series (2 - Td) 6/9/2027 Allergies as of 10/26/2017  Review Complete On: 10/26/2017 By: Rafael Short Severity Noted Reaction Type Reactions Contrast Agent [Iodine]  03/02/2015    Unknown (comments) Doxycycline  03/02/2015    Unknown (comments) Erythromycin  03/02/2015    Unknown (comments) Macrodantin [Nitrofurantoin Macrocrystalline]  03/02/2015    Unknown (comments) Sulfa (Sulfonamide Antibiotics)  03/02/2015    Hives Eyes turned red Tamoxifen  04/07/2017    Other (comments) Current Immunizations  Reviewed on 10/26/2017 Name Date Influenza High Dose Vaccine PF 9/8/2017, 9/2/2016, 10/1/2015 Influenza Vaccine 10/17/2014 Pneumococcal Conjugate (PCV-13) 9/2/2016 Pneumococcal Vaccine (Unspecified Type) 10/1/2009 Td 6/1/2008 Zoster Vaccine, Live 6/1/2007 Reviewed by Rafael Short on 10/26/2017 at  3:33 PM  
You Were Diagnosed With   
  
 Codes Comments Essential hypertension    -  Primary ICD-10-CM: I10 
ICD-9-CM: 401.9 Essential hypertension with goal blood pressure less than 140/90     ICD-10-CM: I10 
ICD-9-CM: 401.9 Toxic multinodular goiter     ICD-10-CM: E05.20 ICD-9-CM: 242.20 Rash     ICD-10-CM: R21 
ICD-9-CM: 782.1 Vitals BP Pulse Temp Resp Height(growth percentile) 170/90 (BP 1 Location: Right arm, BP Patient Position: Sitting) 83 97.8 °F (36.6 °C) (Temporal) 17 5' 6\" (1.676 m) Weight(growth percentile) SpO2 BMI OB Status Smoking Status 139 lb 9.6 oz (63.3 kg) 96% 22.53 kg/m2 Hysterectomy Never Smoker Vitals History BMI and BSA Data Body Mass Index Body Surface Area  
 22.53 kg/m 2 1.72 m 2 Preferred Pharmacy Pharmacy Name Phone [facility], [address] AT [facility] OF  3 & [facility] [place] [place] [phone] Your Updated Medication List  
  
   
This list is accurate as of: 10/26/17  3:54 PM.  Always use your most recent med list.  
  
  
  
  
 aspirin delayed-release 81 mg tablet Take  by mouth daily. bisoprolol 10 mg tablet Commonly known as:  Maria Antonia Finder Take 1 Tab by mouth daily. Indications: thyroid, pressure, heart CLARITIN 10 mg tablet Generic drug:  loratadine Take 10 mg by mouth. dilTIAZem  mg XR capsule Commonly known as:  DILACOR XR Take 1 Cap by mouth daily. Indications: pressure and heart  
  
 glucosamine-chondroitin 750-600 mg Tab Take  by mouth two (2) times a day. methIMAzole 5 mg tablet Commonly known as:  TAPAZOLE Take one tablet on Monday, Wednesday, Friday for thyroid  
  
 mometasone 0.1 % topical cream  
Commonly known as:  ELOCON  
AAA to itchy rash daily  
  
 omeprazole 40 mg capsule Commonly known as:  PRILOSEC Take 1 Cap by mouth daily. STOOL SOFTENER 100 mg capsule Generic drug:  docusate sodium Take 100 mg by mouth three (3) times daily as needed. VITAMIN B-12 1,000 mcg tablet Generic drug:  cyanocobalamin Take 1,000 mcg by mouth daily. Prescriptions Sent to Pharmacy Refills  
 dilTIAZem XR (DILACOR XR) 120 mg XR capsule 11 Sig: Take 1 Cap by mouth daily. Indications: pressure and heart Class: Normal  
 Pharmacy: [facility] [address]  Ph #: [phone] Route: Oral  
 mometasone (ELOCON) 0.1 % topical cream 3 Sig: AAA to itchy rash daily Class: Normal  
 Pharmacy: [facility] [address]  Ph #: [phone] Follow-up Instructions Return in about 2 months (around 12/26/2017). Patient Instructions Go back to the 1 pill bisoprolol daily. We're adding a helper for pressure and pulse called diltiazem. Introducing \[facility]\"" & [facility] SERVICES! Dear Bisi Mcgrawor: 
Thank you for requesting a MashMe.TV account. Our records indicate that you already have an active [email or web address] account. You can access your account anytime at [email or web address] Did you know that you can access your hospital and ER discharge instructions at any time in MashMe.TV? You can also review all of your test results from your hospital stay or ER visit. Additional Information If you have questions, please visit the Frequently Asked Questions section of the [email or web address] website at [email or web address] Remember, [email or web address].TV is NOT to be used for urgent needs. For medical emergencies, dial 911. Now available from your iPhone and Android! Please provide this summary of care documentation to your next provider. Your primary care clinician is listed as Munira Barnett. If you have any questions after today's visit, please call [phone].

## 2017-10-26 NOTE — PROGRESS NOTES
Rozina Crowley is a 80 y.o. female presenting for/with:    Rash    HPI  Follow- up for abn thyroid levels and abn PTH  Multinodular toxic goiter. Last labs show normal parathyroid and improved overactive thyroid. Saw Endocrine. Thought pt should bump up her bisoprolol to 20mg due to high pulse and BP. Pt tried that, but her intermittent rash got a lot worse after that. Remains on lower dose DURANT. Tremor and movement issues in remission. Racing thoughts a little worse lately. For her hyper PTH, is off Vit D now, and Endo is following that. Hasn't missed it, sleeping ok now. Lab Results   Component Value Date/Time    TSH 0.576 10/05/2017 01:24 PM    T3 Uptake 26 04/11/2016 05:42 PM    T4, Free 1.09 10/05/2017 01:24 PM    T4, Total 11.6 04/11/2016 05:42 PM     Lab Results   Component Value Date/Time    Calcium 10.9 10/05/2017 01:24 PM    PTH, Intact 36 10/26/2016 12:13 PM     Hypertension. Blood pressures have been up still. Management at last visit included con't bisoprolol 10mg qd. Current regimen: beta-blocker (due to hx of hyperthyroid). Symptoms include rash to volar forearms, itchy. Patient denies palpitations, edema. Lab review:   Lab Results   Component Value Date/Time    Sodium 144 10/05/2017 01:24 PM    Potassium 3.7 10/05/2017 01:24 PM    Chloride 101 10/05/2017 01:24 PM    CO2 29 10/05/2017 01:24 PM    Glucose 113 10/05/2017 01:24 PM    BUN 21 10/05/2017 01:24 PM    Creatinine 1.07 10/05/2017 01:24 PM    BUN/Creatinine ratio 20 10/05/2017 01:24 PM    GFR est AA 55 10/05/2017 01:24 PM    GFR est non-AA 48 10/05/2017 01:24 PM    Calcium 10.9 10/05/2017 01:24 PM     PMH, SH, Medications/Allergies: reviewed, on chart. Pt's  d/c from cancer late Oct 2016.     ROS:  Constitutional: No fever, chills or weight loss  Respiratory: No cough, SOB   CV: No recent chest pain, No Palpitations    Visit Vitals    /90 (BP 1 Location: Right arm, BP Patient Position: Sitting)    Pulse 83    Temp 97.8 °F (36.6 °C) (Temporal)    Resp 17    Ht 5' 6\" (1.676 m)    Wt 139 lb 9.6 oz (63.3 kg)    SpO2 96%    BMI 22.53 kg/m2     Wt Readings from Last 3 Encounters:   10/26/17 139 lb 9.6 oz (63.3 kg)   10/13/17 135 lb (61.2 kg)   09/08/17 139 lb (63 kg)     BP Readings from Last 3 Encounters:   10/26/17 170/90   10/13/17 (!) 179/98   09/08/17 171/88   +4#  Physical Examination: General appearance - alert, well appearing, and in no distress  Mental status - alert, oriented to person, place, and time  Eyes - pupils equal and reactive, extraocular eye movements intact. ENT - bilateral external ears and nose normal. Normal lips  Neck - supple, no significant adenopathy, no palpable thyromegaly. Lymphatics - no palpable lymphadenopathy, no hepatosplenomegaly  Chest - clear to auscultation, no wheezes, rales or rhonchi, symmetric air entry  Heart - normal rate, regular rhythm, normal S1, S2, no murmurs, rubs, clicks or gallops  Extremities - peripheral pulses normal, no pedal edema, no clubbing or cyanosis. mlid pappilar eruption to dorsal and volar upper ext mostly below the elbow. Neuro- minimal tremor. A/P:  Multinodular, mildly toxic goiter. Pressure up even on BID bisoprolol 10mg. Rash worsening. Cut back to 10mg every day. Con't lower dose DURANT 5mg x3d/wk, rash mostly resolved. Seeing Dr. Quinn Crew for Endo f/u as directed in APR    Movement disorder/chorea  Still quiescent. Monitor. Osteoporosis. Needs control of thyroid. Meets criteria for other agent. PT does have breast ca hx, so evista is a good choice, discussed, pt still wants to look it up. Plan DXA 2018    HTN  Above goal. Pulse still up too. Had rash with higher dose zebeta 20mg, so cut back to 10mg every day and add diltiazem 120mg every day. Tx rash with elocon. F/U 2mo.

## 2017-10-26 NOTE — PATIENT INSTRUCTIONS
Go back to the 1 pill bisoprolol daily. We're adding a helper for pressure and pulse called diltiazem.

## 2017-12-08 ENCOUNTER — OFFICE VISIT (OUTPATIENT)
Dept: FAMILY MEDICINE CLINIC | Age: 82
End: 2017-12-08

## 2017-12-08 VITALS
BODY MASS INDEX: 23.01 KG/M2 | OXYGEN SATURATION: 98 % | SYSTOLIC BLOOD PRESSURE: 130 MMHG | HEART RATE: 71 BPM | WEIGHT: 143.2 LBS | TEMPERATURE: 97.8 F | DIASTOLIC BLOOD PRESSURE: 82 MMHG | HEIGHT: 66 IN | RESPIRATION RATE: 16 BRPM

## 2017-12-08 DIAGNOSIS — C50.912 BILATERAL MALIGNANT NEOPLASM OF BREAST IN FEMALE, ESTROGEN RECEPTOR POSITIVE, UNSPECIFIED SITE OF BREAST (HCC): ICD-10-CM

## 2017-12-08 DIAGNOSIS — C50.911 BILATERAL MALIGNANT NEOPLASM OF BREAST IN FEMALE, ESTROGEN RECEPTOR POSITIVE, UNSPECIFIED SITE OF BREAST (HCC): ICD-10-CM

## 2017-12-08 DIAGNOSIS — M81.0 AGE-RELATED OSTEOPOROSIS WITHOUT CURRENT PATHOLOGICAL FRACTURE: ICD-10-CM

## 2017-12-08 DIAGNOSIS — Z17.0 BILATERAL MALIGNANT NEOPLASM OF BREAST IN FEMALE, ESTROGEN RECEPTOR POSITIVE, UNSPECIFIED SITE OF BREAST (HCC): ICD-10-CM

## 2017-12-08 DIAGNOSIS — I10 ESSENTIAL HYPERTENSION: ICD-10-CM

## 2017-12-08 DIAGNOSIS — E05.20 TOXIC MULTINODULAR GOITER: Primary | ICD-10-CM

## 2017-12-08 RX ORDER — RALOXIFENE HYDROCHLORIDE 60 MG/1
60 TABLET, FILM COATED ORAL DAILY
Qty: 30 TAB | Refills: 11 | Status: SHIPPED | OUTPATIENT
Start: 2017-12-08 | End: 2017-12-08 | Stop reason: SDUPTHER

## 2017-12-08 NOTE — PATIENT INSTRUCTIONS
Osteoporosis: Care Instructions  Your Care Instructions    Osteoporosis causes bones to become thin and weak. It is much more common in women than in men. Osteoporosis may be very advanced before you know you have it. Sometimes the first sign is a broken bone in the hip, spine, or wrist or sudden pain in your middle or lower back. Follow-up care is a key part of your treatment and safety. Be sure to make and go to all appointments, and call your doctor if you are having problems. It's also a good idea to know your test results and keep a list of the medicines you take. How can you care for yourself at home? · Your doctor may prescribe a bisphosphonate, such as risedronate (Actonel) or alendronate (Fosamax), for osteoporosis. If you are taking one of these medicines by mouth:  ¨ Take your medicine with a full glass of water when you first get up in the morning. ¨ Do not lie down, eat, drink a beverage, or take any other medicine for at least 30 minutes after taking the drug. This helps prevent stomach problems. ¨ Do not take your medicine late in the day if you forgot to take it in the morning. Skip it, and take the usual dose the next morning. ¨ If you have side effects, tell your doctor. He or she may prescribe another medicine. · Get enough calcium and vitamin D. The [place] of Medicine recommends adults younger than age 46 need 1,000 mg of calcium and 600 IU of vitamin D each day. Women ages 46 to 79 need 1,200 mg of calcium and 600 IU of vitamin D each day. Men ages 46 to 79 need 1,000 mg of calcium and 600 IU of vitamin D each day. Adults 71 and older need 1,200 mg of calcium and 800 IU of vitamin D each day. ¨ Eat foods rich in calcium, like yogurt, cheese, milk, and dark green vegetables. This is a good way to get the calcium you need. You can get vitamin D from eggs, fatty fish, cereal, and milk. ¨ Talk to your doctor about taking a calcium plus vitamin D supplement. Be careful, though.  Adults ages 23 to 48 should not get more than 2,500 mg of calcium and 4,000 IU of vitamin D each day, whether it is from supplements and/or food. Adults ages 46 and older should not get more than 2,000 mg of calcium and 4,000 IU of vitamin D each day from supplements and/or food. · Limit alcohol to 2 drinks a day for men and 1 drink a day for women. Too much alcohol can cause health problems. · Do not smoke. Smoking puts you at a much higher risk for osteoporosis. If you need help quitting, talk to your doctor about stop-smoking programs and medicines. These can increase your chances of quitting for good. · Get regular bone-building exercise. Weight-bearing and resistance exercises keep bones healthy by working the muscles and bones against gravity. Start out at an exercise level that feels right for you. Add a little at a time until you can do the following:  ¨ Do 30 minutes of weight-bearing exercise on most days of the week. Walking, jogging, stair climbing, and dancing are good choices. ¨ Do resistance exercises with weights or elastic bands 2 to 3 days a week. · Reduce your risk of falls:  ¨ Wear supportive shoes with low heels and nonslip soles. ¨ Use a cane or walker, if you need it. Use shower chairs and bath benches. Put in handrails on stairways, around your shower or tub area, and near the toilet. ¨ Keep stairs, porches, and walkways well lit. Use night-lights. ¨ Remove throw rugs and other objects that are in the way. ¨ Avoid icy, wet, or slippery surfaces. ¨ Keep a cordless phone and a flashlight with new batteries by your bed. When should you call for help? Watch closely for changes in your health, and be sure to contact your doctor if you have any problems. Where can you learn more? Go to http://frandy-josé miguel.info/. Enter K100 in the search box to learn more about \"Osteoporosis: Care Instructions. \"  Current as of:  May 12, 2017  Content Version: 11.4  © 4837-2826 Healthwise, Incorporated. Care instructions adapted under license by [employer] (which disclaims liability or warranty for this information). If you have questions about a medical condition or this instruction, always ask your healthcare professional. [address] any warranty or liability for your use of this information. If you have any questions regarding mychart, you may call Retrace support at [phone].

## 2017-12-08 NOTE — MR AVS SNAPSHOT
Visit Information Date & Time Provider Department Dept. Phone Encounter #  
 12/8/2017  1:20 PM Jade Myers MD [facility] Rowdy Schultz [id number] Follow-up Instructions Return in about 3 months (around 3/8/2018). Follow-up and Disposition History Your Appointments 4/20/2018  1:30 PM  
Follow Up with MD [facility] Appt Note: 6 month f/u  
 [address] [facility] [address] [phone] 570 [facility] Upcoming Health Maintenance Date Due  
 GLAUCOMA SCREENING Q2Y 2/3/2018 MEDICARE YEARLY EXAM 2/4/2018 DTaP/Tdap/Td series (2 - Td) 6/9/2027 Allergies as of 12/8/2017  Review Complete On: 12/8/2017 By: Rigoberto Hands, LPN Severity Noted Reaction Type Reactions Contrast Agent [Iodine]  03/02/2015    Unknown (comments) Doxycycline  03/02/2015    Unknown (comments) Erythromycin  03/02/2015    Unknown (comments) Macrodantin [Nitrofurantoin Macrocrystalline]  03/02/2015    Unknown (comments) Sulfa (Sulfonamide Antibiotics)  03/02/2015    Hives Eyes turned red Tamoxifen  04/07/2017    Other (comments) Current Immunizations  Reviewed on 10/26/2017 Name Date Influenza High Dose Vaccine PF 9/8/2017, 9/2/2016, 10/1/2015 Influenza Vaccine 10/17/2014 Pneumococcal Conjugate (PCV-13) 9/2/2016 Pneumococcal Vaccine (Unspecified Type) 10/1/2009 Td 6/1/2008 Zoster Vaccine, Live 6/1/2007 Not reviewed this visit You Were Diagnosed With   
  
 Codes Comments Toxic multinodular goiter    -  Primary ICD-10-CM: E05.20 ICD-9-CM: 242.20 Essential hypertension     ICD-10-CM: I10 
ICD-9-CM: 401.9 Age-related osteoporosis without current pathological fracture     ICD-10-CM: M81.0 ICD-9-CM: 733.01   
 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  
 23.11 kg/m 2 1.74 m 2 Preferred Pharmacy Pharmacy Name Phone [facility], [address] AT [facility] OF  3 & ZIG[facility]INZ [facility]. Jossy Forbes [phone] Your Updated Medication List  
  
   
This list is accurate as of: 12/8/17  2:31 PM.  Always use your most recent med list.  
  
  
  
  
 aspirin delayed-release 81 mg tablet Take  by mouth daily. bisoprolol 10 mg tablet Commonly known as:  Ralene Blacksmith Take 1 Tab by mouth daily. Indications: thyroid, pressure, heart CLARITIN 10 mg tablet Generic drug:  loratadine Take 10 mg by mouth. dilTIAZem  mg XR capsule Commonly known as:  DILACOR XR Take 1 Cap by mouth daily. Indications: pressure and heart  
  
 glucosamine-chondroitin 750-600 mg Tab Take  by mouth two (2) times a day. methIMAzole 5 mg tablet Commonly known as:  TAPAZOLE Take one tablet on Monday, Wednesday, Friday for thyroid  
  
 mometasone 0.1 % topical cream  
Commonly known as:  ELOCON  
AAA to itchy rash daily  
  
 omeprazole 40 mg capsule Commonly known as:  PRILOSEC Take 1 Cap by mouth daily. raloxifene 60 mg tablet Commonly known as:  EVISTA Take 1 Tab by mouth daily. STOOL SOFTENER 100 mg capsule Generic drug:  docusate sodium Take 100 mg by mouth three (3) times daily as needed. VITAMIN B-12 1,000 mcg tablet Generic drug:  cyanocobalamin Take 1,000 mcg by mouth daily. Prescriptions Sent to Pharmacy Refills  
 raloxifene (EVISTA) 60 mg tablet 11 Sig: Take 1 Tab by mouth daily. Class: Normal  
 Pharmacy: [facility] [place] [address], [address] [place] [address] [address]  #: [phone] Route: Oral  
  
Follow-up Instructions Return in about 3 months (around 3/8/2018). Patient Instructions Osteoporosis: Care Instructions Your Care Instructions Osteoporosis causes bones to become thin and weak. It is much more common in women than in men. Osteoporosis may be very advanced before you know you have it. Sometimes the first sign is a broken bone in the hip, spine, or wrist or sudden pain in your middle or lower back. Follow-up care is a key part of your treatment and safety. Be sure to make and go to all appointments, and call your doctor if you are having problems. It's also a good idea to know your test results and keep a list of the medicines you take. How can you care for yourself at home? · Your doctor may prescribe a bisphosphonate, such as risedronate (Actonel) or alendronate (Fosamax), for osteoporosis. If you are taking one of these medicines by mouth: 
¨ Take your medicine with a full glass of water when you first get up in the morning. ¨ Do not lie down, eat, drink a beverage, or take any other medicine for at least 30 minutes after taking the drug. This helps prevent stomach problems. ¨ Do not take your medicine late in the day if you forgot to take it in the morning. Skip it, and take the usual dose the next morning. ¨ If you have side effects, tell your doctor. He or she may prescribe another medicine. · Get enough calcium and vitamin D. The [place] of Medicine recommends adults younger than age 46 need 1,000 mg of calcium and 600 IU of vitamin D each day.  Women ages 46 to 79 need 1,200 mg of calcium and 600 IU of vitamin D each day. Men ages 46 to 79 need 1,000 mg of calcium and 600 IU of vitamin D each day. Adults 71 and older need 1,200 mg of calcium and 800 IU of vitamin D each day. ¨ Eat foods rich in calcium, like yogurt, cheese, milk, and dark green vegetables. This is a good way to get the calcium you need. You can get vitamin D from eggs, fatty fish, cereal, and milk. ¨ Talk to your doctor about taking a calcium plus vitamin D supplement. Be careful, though. Adults ages 23 to 48 should not get more than 2,500 mg of calcium and 4,000 IU of vitamin D each day, whether it is from supplements and/or food. Adults ages 46 and older should not get more than 2,000 mg of calcium and 4,000 IU of vitamin D each day from supplements and/or food. · Limit alcohol to 2 drinks a day for men and 1 drink a day for women. Too much alcohol can cause health problems. · Do not smoke. Smoking puts you at a much higher risk for osteoporosis. If you need help quitting, talk to your doctor about stop-smoking programs and medicines. These can increase your chances of quitting for good. · Get regular bone-building exercise. Weight-bearing and resistance exercises keep bones healthy by working the muscles and bones against gravity. Start out at an exercise level that feels right for you. Add a little at a time until you can do the following: ¨ Do 30 minutes of weight-bearing exercise on most days of the week. Walking, jogging, stair climbing, and dancing are good choices. ¨ Do resistance exercises with weights or elastic bands 2 to 3 days a week. · Reduce your risk of falls: 
¨ Wear supportive shoes with low heels and nonslip soles. ¨ Use a cane or walker, if you need it. Use shower chairs and bath benches. Put in handrails on stairways, around your shower or tub area, and near the toilet. ¨ Keep stairs, porches, and walkways well lit. Use night-lights. ¨ Remove throw rugs and other objects that are in the way. ¨ Avoid icy, wet, or slippery surfaces. ¨ Keep a cordless phone and a flashlight with new batteries by your bed. When should you call for help? Watch closely for changes in your health, and be sure to contact your doctor if you have any problems. Where can you learn more? Go to http://frandy-josé miguel.info/. Enter K100 in the search box to learn more about \"Osteoporosis: Care Instructions. \" Current as of: May 12, 2017 Content Version: 11.4 © 4915-6247 [employer]. Care instructions adapted under license by [employer] (which disclaims liability or warranty for this information). If you have questions about a medical condition or this instruction, always ask your healthcare professional. [address] any warranty or liability for your use of this information. If you have any questions regarding Dish.fm, you may call Dish.fm support at [phone]. Patient Instructions History Introducing [facility] & HEALTH SERVICES! Dear Lexie Gotti: 
Thank you for requesting a Dick or Bro account. Our records indicate that you already have an active Dick or Bro account. You can access your account anytime at [email or web address] Did you know that you can access your hospital and ER discharge instructions at any time in Dick or Bro? You can also review all of your test results from your hospital stay or ER visit. Additional Information If you have questions, please visit the Frequently Asked Questions section of the Dick or Bro website at [email or web address] Remember, Dick or Bro is NOT to be used for urgent needs. For medical emergencies, dial 911. Now available from your iPhone and Android! Please provide this summary of care documentation to your next provider. Your primary care clinician is listed as Yonatan Barnett. If you have any questions after today's visit, please call [phone].

## 2017-12-08 NOTE — PROGRESS NOTES
Fransisco Bai is a 80 y.o. female presenting for/with:    Hypertension    HPI  Follow- up for abn thyroid levels and abn PTH  Multinodular toxic goiter. Last labs show normal parathyroid and improved overactive thyroid. Saw Endocrine. Remains on 10mg bisprolol due to high pulse and BP. Pt tried that, but her intermittent rash got a lot worse after that. Remains on lower dose DURANT 5mg TIW. Tremor and movement issues in remission. For her hyper PTH, remains off Vit D. Endo is following that. Hasn't missed it, sleeping ok now. Lab Results   Component Value Date/Time    TSH 0.576 10/05/2017 01:24 PM    T3 Uptake 26 04/11/2016 05:42 PM    T4, Free 1.09 10/05/2017 01:24 PM    T4, Total 11.6 04/11/2016 05:42 PM     Lab Results   Component Value Date/Time    Calcium 10.9 10/05/2017 01:24 PM    PTH, Intact 36 10/26/2016 12:13 PM     Hypertension. Blood pressures have been improving. Management at last visit included con't bisoprolol 10mg every day and add diltiazem 120mg every day Current regimen: beta-blocker (due to hx of hyperthyroid) and CCB. Symptoms include no sx. Rash to volar forearms has resolved. Patient denies palpitations, edema. Lab review:   Lab Results   Component Value Date/Time    Sodium 144 10/05/2017 01:24 PM    Potassium 3.7 10/05/2017 01:24 PM    Chloride 101 10/05/2017 01:24 PM    CO2 29 10/05/2017 01:24 PM    Glucose 113 10/05/2017 01:24 PM    BUN 21 10/05/2017 01:24 PM    Creatinine 1.07 10/05/2017 01:24 PM    BUN/Creatinine ratio 20 10/05/2017 01:24 PM    GFR est AA 55 10/05/2017 01:24 PM    GFR est non-AA 48 10/05/2017 01:24 PM    Calcium 10.9 10/05/2017 01:24 PM     PMH, SH, Medications/Allergies: reviewed, on chart. Pt's  d/c from cancer late Oct 2016.     ROS:  Constitutional: No fever, chills or weight loss  Respiratory: No cough, SOB   CV: No recent chest pain, No Palpitations    Visit Vitals    /82 (BP 1 Location: Right arm, BP Patient Position: Sitting)    Pulse 71    Temp 97.8 °F (36.6 °C) (Oral)    Resp 16    Ht 5' 6\" (1.676 m)    Wt 143 lb 3.2 oz (65 kg)    SpO2 98%    BMI 23.11 kg/m2     Wt Readings from Last 3 Encounters:   12/08/17 143 lb 3.2 oz (65 kg)   10/26/17 139 lb 9.6 oz (63.3 kg)   10/13/17 135 lb (61.2 kg)     BP Readings from Last 3 Encounters:   12/08/17 130/82   10/26/17 170/90   10/13/17 (!) 179/98   +4#  Physical Examination: General appearance - alert, well appearing, and in no distress  Mental status - alert, oriented to person, place, and time  Eyes - pupils equal and reactive, extraocular eye movements intact. ENT - bilateral external ears and nose normal. Normal lips  Neck - supple, no significant adenopathy, no palpable thyromegaly. Lymphatics - no palpable lymphadenopathy, no hepatosplenomegaly  Chest - clear to auscultation, no wheezes, rales or rhonchi, symmetric air entry  Heart - normal rate, regular rhythm, normal S1, S2, no murmurs, rubs, clicks or gallops  Extremities - peripheral pulses normal, no pedal edema, no clubbing or cyanosis. mlid pappilar eruption to dorsal and volar upper ext mostly below the elbow. Neuro- minimal tremor. A/P:  Multinodular, mildly toxic goiter. Pressure great on current regimen. Con't. Next visit with Dr. Brad Callejas for Endo in APR    Osteoporosis. Working on getting control of hyperthyroid. Meets criteria for other agent. PT does have breast ca hx, so evista is a good choice, discussed, pt ready to start. Had reaction to tamoxifen, maybe bad hot flushing, pt not sure. See how she tolerates the evista. Plan DXA 2019    HTN  In goal. Pulse in goal. Rash resolved. Con't current tx. Movement disorder/chorea  Quiescent. Monitor. F/U 3mo.

## 2018-01-02 DIAGNOSIS — I10 ESSENTIAL HYPERTENSION WITH GOAL BLOOD PRESSURE LESS THAN 140/90: ICD-10-CM

## 2018-01-02 DIAGNOSIS — E05.20 TOXIC MULTINODULAR GOITER: ICD-10-CM

## 2018-01-02 NOTE — TELEPHONE ENCOUNTER
[phone] contact # per patient, need a refill called in to [facility] for the following medication:  Bisoprolol  10 mg tablet  90/day supply  Thanks,    **completely out of medication & no refills left**  Last visit:  Friday, December 08, 2017 01:20pm  Next visit:  Friday, March 9, 2018 02:00pm

## 2018-01-04 RX ORDER — BISOPROLOL FUMARATE 10 MG/1
10 TABLET ORAL DAILY
Qty: 180 TAB | Refills: 3 | Status: SHIPPED | OUTPATIENT
Start: 2018-01-04 | End: 2018-10-25 | Stop reason: ALTCHOICE

## 2018-02-09 ENCOUNTER — TELEPHONE (OUTPATIENT)
Dept: FAMILY MEDICINE CLINIC | Age: 83
End: 2018-02-09

## 2018-02-09 NOTE — TELEPHONE ENCOUNTER
Called pt advised her to call 911 or go to the Er. ... All she could get out I feel so bad. She did state her grandson had the flu. She has the chills.

## 2018-02-09 NOTE — TELEPHONE ENCOUNTER
Jerome Scanlon (dgt) called thinks mother has the flu  Would like something called in to [facility]  Pt has chills/fever/cough pt is 80 year of age  [phone]  Please advise pt on what to do

## 2018-03-09 ENCOUNTER — OFFICE VISIT (OUTPATIENT)
Dept: FAMILY MEDICINE CLINIC | Age: 83
End: 2018-03-09

## 2018-03-09 VITALS
TEMPERATURE: 97.7 F | RESPIRATION RATE: 14 BRPM | BODY MASS INDEX: 22.02 KG/M2 | HEIGHT: 66 IN | WEIGHT: 137 LBS | HEART RATE: 63 BPM | SYSTOLIC BLOOD PRESSURE: 138 MMHG | OXYGEN SATURATION: 97 % | DIASTOLIC BLOOD PRESSURE: 68 MMHG

## 2018-03-09 DIAGNOSIS — Z13.31 SCREENING FOR DEPRESSION: ICD-10-CM

## 2018-03-09 DIAGNOSIS — Z13.39 SCREENING FOR ALCOHOLISM: ICD-10-CM

## 2018-03-09 DIAGNOSIS — Z00.00 MEDICARE ANNUAL WELLNESS VISIT, SUBSEQUENT: Primary | ICD-10-CM

## 2018-03-09 NOTE — MR AVS SNAPSHOT
[facility] 
 
 
 [address] [facility] [phone] Patient: Paul Almeida MRN: [id number] IYX:[birth year] Visit Information Date & Time Provider Department Dept. Phone Encounter #  
 3/9/2018  2:00 PM Ginger Nance, Jared Schultz [id number] Follow-up Instructions Return in about 6 months (around 9/9/2018). Follow-up and Disposition History Your Appointments 4/20/2018  1:30 PM  
Follow Up with Lizett Molina MD  
[facility]) Appt Note: 6 month f/u  
 [address] [facility] [phone] [address] [facility] Upcoming Health Maintenance Date Due  
 GLAUCOMA SCREENING Q2Y 2/3/2018 MEDICARE YEARLY EXAM 2/4/2018 DTaP/Tdap/Td series (2 - Td) 3/9/2028 Allergies as of 3/9/2018  Review Complete On: 3/9/2018 By: Ginger Nance MD  
  
 Severity Noted Reaction Type Reactions Contrast Agent [Iodine]  03/02/2015    Unknown (comments) Doxycycline  03/02/2015    Unknown (comments) Erythromycin  03/02/2015    Unknown (comments) Macrodantin [Nitrofurantoin Macrocrystalline]  03/02/2015    Unknown (comments) Sulfa (Sulfonamide Antibiotics)  03/02/2015    Hives Eyes turned red Tamoxifen  04/07/2017    Other (comments) Current Immunizations  Reviewed on 10/26/2017 Name Date Influenza High Dose Vaccine PF 9/8/2017, 9/2/2016, 10/1/2015 Influenza Vaccine 10/17/2014 Pneumococcal Conjugate (PCV-13) 9/2/2016 Pneumococcal Vaccine (Unspecified Type) 10/1/2009 Td 6/1/2008 Zoster Vaccine, Live 6/1/2007 Not reviewed this visit You Were Diagnosed With   
  
 Codes Comments Medicare annual wellness visit, subsequent    -  Primary ICD-10-CM: Z00.00 ICD-9-CM: V70.0 Screening for alcoholism     ICD-10-CM: Z13.89 ICD-9-CM: V79.1 Screening for depression     ICD-10-CM: Z13.89 ICD-9-CM: V79.0 Vitals BP Pulse Temp Resp Height(growth percentile) Weight(growth percentile)  
 138/68 (BP 1 Location: Right arm, BP Patient Position: Sitting) 63 97.7 °F (36.5 °C) (Oral) 14 5' 6\" (1.676 m) 137 lb (62.1 kg) SpO2 BMI OB Status Smoking Status 97% 22.11 kg/m2 Hysterectomy Never Smoker Vitals History BMI and BSA Data Body Mass Index Body Surface Area  
 22.11 kg/m 2 1.7 m 2 Preferred Pharmacy Pharmacy Name Phone [address] AT [facility] OF  [address] [facility] [phone] Your Updated Medication List  
  
   
This list is accurate as of 3/9/18  2:35 PM.  Always use your most recent med list.  
  
  
  
  
 aspirin delayed-release 81 mg tablet Take  by mouth daily. bisoprolol 10 mg tablet Commonly known as:  Bryce Man Take 1 Tab by mouth daily. Indications: thyroid, pressure, heart CLARITIN 10 mg tablet Generic drug:  loratadine Take 10 mg by mouth. dilTIAZem  mg XR capsule Commonly known as:  DILACOR XR Take 1 Cap by mouth daily. Indications: pressure and heart  
  
 glucosamine-chondroitin 750-600 mg Tab Take  by mouth two (2) times a day. methIMAzole 5 mg tablet Commonly known as:  TAPAZOLE Take one tablet on Monday, Wednesday, Friday for thyroid  
  
 mometasone 0.1 % topical cream  
Commonly known as:  ELOCON  
AAA to itchy rash daily  
  
 omeprazole 40 mg capsule Commonly known as:  PRILOSEC  
TAKE 1 CAPSULE BY MOUTH DAILY  
  
 raloxifene 60 mg tablet Commonly known as:  EVISTA TAKE 1 TABLET BY MOUTH DAILY  
  
 STOOL SOFTENER 100 mg capsule Generic drug:  docusate sodium Take 100 mg by mouth three (3) times daily as needed. VITAMIN B-12 1,000 mcg tablet Generic drug:  cyanocobalamin Take 1,000 mcg by mouth daily. We Performed the Following [address] [[id number] [facility]] [place] ANNUAL ALCOHOL SCREEN 15 MIN [id number] [facility]] Follow-up Instructions Return in about 6 months (around 9/9/2018). Patient Instructions Medicare Wellness Visit, Female The best way to live healthy is to have a healthy lifestyle by eating a well-balanced diet, exercising regularly, limiting alcohol and stopping smoking. Regular physical exams and screening tests are another way to keep healthy. Preventive exams provided by your health care provider can find health problems before they become diseases or illnesses. Preventive services including immunizations, screening tests, monitoring and exams can help you take care of your own health. All people over 65 should have a yearly flu shot and a tetanus vaccine every 10 years. Glaucoma is a disease of the eye due to increased ocular pressure that can lead to blindness and it should be done every year by an eye professional. 
 
Your Medicare Wellness Exam is recommended annually. Here is a list of your current Health Maintenance items with a due date: 
Health Maintenance Due Topic Date Due  Glaucoma Screening   02/03/2018 Edson Wilson Annual Well Visit  02/04/2018 If you have any questions regarding [employer], you may call [employer] support at [phone]. Patient Instructions History Introducing [facility]! Dear Zaira Dubon: 
Thank you for requesting a [employer] account. Our records indicate that you already have an active ActivePath account. You can access your account anytime at [email or web address]. [employer] Did you know that you can access your hospital and ER discharge instructions at any time in [employer]? You can also review all of your test results from your hospital stay or ER visit. Additional Information If you have questions, please visit the Frequently Asked Questions section of the [employer] website at [email or web address] Remember, [employer] is NOT to be used for urgent needs. For medical emergencies, dial 911. Now available from your iPhone and Android! Please provide this summary of care documentation to your next provider. Your primary care clinician is listed as María Barnett. If you have any questions after today's visit, please call [phone].

## 2018-03-09 NOTE — PATIENT INSTRUCTIONS
Medicare Wellness Visit, Female    The best way to live healthy is to have a healthy lifestyle by eating a well-balanced diet, exercising regularly, limiting alcohol and stopping smoking. Regular physical exams and screening tests are another way to keep healthy. Preventive exams provided by your health care provider can find health problems before they become diseases or illnesses. Preventive services including immunizations, screening tests, monitoring and exams can help you take care of your own health. All people over 65 should have a yearly flu shot and a tetanus vaccine every 10 years. Glaucoma is a disease of the eye due to increased ocular pressure that can lead to blindness and it should be done every year by an eye professional.    Your Medicare Wellness Exam is recommended annually. Here is a list of your current Health Maintenance items with a due date:  Health Maintenance Due   Topic Date Due    Glaucoma Screening   02/03/2018    Annual Well Visit  02/04/2018   If you have any questions regarding [employer], you may call "[employer] support at [phone].

## 2018-03-09 NOTE — PROGRESS NOTES
Van Yates is a 80 y.o. female and presents for annual Medicare Wellness Visit. Problem List: Reviewed with patient and discussed risk factors.     Patient Active Problem List   Diagnosis Code    Constipation K59.00    Breast cancer ([facility] [address] C50.919    Hypertension I10    GERD (gastroesophageal reflux disease) K21.9    Skin cancer of nose C43.1    Encounter for long-term (current) drug use Z79.899    Hypercalcemia E83.52    Hyperparathyroidism ([facility] [address] E21.3    Osteoporosis M81.0    Toxic multinodular goiter E05.20    S/P left mastectomy Z90.12       Current medical providers:  Patient Care Team:  Isaias Flowers MD as PCP - General (Family Practice)    PSH: Reviewed with patient  Past Surgical History:   Procedure Laterality Date    HX APPENDECTOMY      HX BREAST LUMPECTOMY      HX BREAST LUMPECTOMY Left 1998    HX BREAST LUMPECTOMY Right 1999    HX CATARACT REMOVAL Bilateral     HX HEENT      nose surgery after skin cancer removed    HX HEENT      YAG both eyes    HX HYSTERECTOMY  1980    HX LITHOTRIPSY  1979    HX MASTECTOMY  2002    HX MASTECTOMY Left     HX OTHER SURGICAL      had left lower rib removed to extract a kidney stone    HX TUBAL LIGATION          SH: Reviewed with patient  Social History   Substance Use Topics    Smoking status: Never Smoker    Smokeless tobacco: Never Used    Alcohol use 0.0 oz/week     0 Standard drinks or equivalent per week      Comment: on the holidays may have a mixed       FH: Reviewed with patient  Family History   Problem Relation Age of Onset    Cancer Mother      lymphoma    Heart Disease Father     Heart Disease Maternal Grandmother     Diabetes Maternal Grandfather     Heart Disease Paternal Grandmother     Colon Cancer Maternal Aunt     Other Daughter      Overdose    Thyroid Disease Neg Hx        Medications/Allergies: Reviewed with patient  Current Outpatient Prescriptions on File Prior to Visit   Medication Sig Dispense Refill    omeprazole (PRILOSEC) 40 mg capsule TAKE 1 CAPSULE BY MOUTH DAILY 90 Cap 3    bisoprolol (ZEBETA) 10 mg tablet Take 1 Tab by mouth daily. Indications: thyroid, pressure, heart 180 Tab 3    raloxifene (EVISTA) 60 mg tablet TAKE 1 TABLET BY MOUTH DAILY 90 Tab 3    dilTIAZem XR (DILACOR XR) 120 mg XR capsule Take 1 Cap by mouth daily. Indications: pressure and heart 30 Cap 11    mometasone (ELOCON) 0.1 % topical cream AAA to itchy rash daily 45 g 3    loratadine (CLARITIN) 10 mg tablet Take 10 mg by mouth.  methIMAzole (TAPAZOLE) 5 mg tablet Take one tablet on Monday, Wednesday, Friday for thyroid 52 Tab 3    docusate sodium (STOOL SOFTENER) 100 mg capsule Take 100 mg by mouth three (3) times daily as needed.  aspirin delayed-release 81 mg tablet Take  by mouth daily.  GLUCOSAMINE HCL/CHONDR PRESCOTT A NA (GLUCOSAMINE-CHONDROITIN) 750-600 mg tab Take  by mouth two (2) times a day.  cyanocobalamin (VITAMIN B-12) 1,000 mcg tablet Take 1,000 mcg by mouth daily. No current facility-administered medications on file prior to visit. Allergies   Allergen Reactions    Contrast Agent [Iodine] Unknown (comments)    Doxycycline Unknown (comments)    Erythromycin Unknown (comments)    Macrodantin [Nitrofurantoin Macrocrystalline] Unknown (comments)    Sulfa (Sulfonamide Antibiotics) Hives     Eyes turned red    Tamoxifen Other (comments)       Objective:  Visit Vitals    /68 (BP 1 Location: Right arm, BP Patient Position: Sitting)    Pulse 63    Temp 97.7 °F (36.5 °C) (Oral)    Resp 14    Ht 5' 6\" (1.676 m)    Wt 137 lb (62.1 kg)    SpO2 97%    BMI 22.11 kg/m2    Body mass index is 22.11 kg/(m^2).     Assessment of cognitive impairment: Alert and oriented x 3     Depression Screen:   PHQ over the last two weeks 3/9/2018   PHQ Not Done -   Little interest or pleasure in doing things Not at all   Feeling down, depressed or hopeless Not at all   Total Score PHQ 2 0 Trouble falling or staying asleep, or sleeping too much -   Feeling tired or having little energy -   Poor appetite or overeating -   Feeling bad about yourself - or that you are a failure or have let yourself or your family down -   Trouble concentrating on things such as school, work, reading or watching TV -   Moving or speaking so slowly that other people could have noticed; or the opposite being so fidgety that others notice -   Thoughts of being better off dead, or hurting yourself in some way -   PHQ 9 Score -       Fall Risk Assessment:    Fall Risk Assessment, last 12 mths 3/9/2018   Able to walk? Yes   Fall in past 12 months? No   Fall with injury? -   Number of falls in past 12 months -   Fall Risk Score -       Functional Ability:   Does the patient exhibit a steady gait?  no   How long did it take the patient to get up and walk from a sitting position? 1     Is the patient self reliant?  (ie can do own laundry, meals, household chores)  yes     Does the patient handle his/her own medications? yes     Does the patient handle his/her own money? yes     Is the patients home safe (ie good lighting, handrails on stairs and bath, etc.)? yes     Did you notice or did patient express any hearing difficulties? no     Did you notice or did patient express any vision difficulties?   no     Were distance and reading eye charts used? no       Advance Care Planning:   Patient was offered the opportunity to discuss advance care planning:  yes     Does patient have an Advance Directive:  yes   If no, did you provide information on Caring Connections?   yes       Plan:      Orders Placed This Encounter    Depression Screen Annual    Annual  Alcohol Screen 15 min ()       Health Maintenance   Topic Date Due    GLAUCOMA SCREENING Q2Y  02/03/2018    MEDICARE YEARLY EXAM  02/04/2018    DTaP/Tdap/Td series (2 - Td) 03/09/2028    Bone Densitometry (Dexa) Screening  Completed    ZOSTER VACCINE AGE 60> Completed    Pneumococcal 65+ High/Highest Risk  Completed    Influenza Age 5 to Adult  Completed       *Patient verbalized understanding and agreement with the plan. A copy of the After Visit Summary with personalized health plan was given to the patient today.

## 2018-04-04 ENCOUNTER — OFFICE VISIT (OUTPATIENT)
Dept: FAMILY MEDICINE CLINIC | Age: 83
End: 2018-04-04

## 2018-04-04 VITALS
RESPIRATION RATE: 12 BRPM | BODY MASS INDEX: 22.08 KG/M2 | HEIGHT: 66 IN | WEIGHT: 137.4 LBS | TEMPERATURE: 97.8 F | OXYGEN SATURATION: 97 % | HEART RATE: 88 BPM | SYSTOLIC BLOOD PRESSURE: 120 MMHG | DIASTOLIC BLOOD PRESSURE: 80 MMHG

## 2018-04-04 DIAGNOSIS — M25.531 WRIST PAIN, ACUTE, RIGHT: Primary | ICD-10-CM

## 2018-04-04 DIAGNOSIS — M77.8: ICD-10-CM

## 2018-04-04 RX ORDER — KETOROLAC TROMETHAMINE 30 MG/ML
30 INJECTION, SOLUTION INTRAMUSCULAR; INTRAVENOUS ONCE
Qty: 1 VIAL | Refills: 0
Start: 2018-04-04 | End: 2018-04-04

## 2018-04-04 RX ORDER — NAPROXEN 500 MG/1
500 TABLET ORAL
Qty: 40 TAB | Refills: 1 | Status: SHIPPED | OUTPATIENT
Start: 2018-04-04 | End: 2019-07-19

## 2018-04-04 RX ORDER — DEXAMETHASONE SODIUM PHOSPHATE 4 MG/ML
2 INJECTION, SOLUTION INTRA-ARTICULAR; INTRALESIONAL; INTRAMUSCULAR; INTRAVENOUS; SOFT TISSUE ONCE
Qty: 1 VIAL | Refills: 0
Start: 2018-04-04 | End: 2018-04-04

## 2018-04-04 NOTE — PROGRESS NOTES
Sangita Hung is a 80 y.o. female presenting for/with:    Wrist Swelling (right side )    HPI:  Symptoms include acute onset of R wrist pain 3 days ago, gradually worsening. More on the thumb side and volar >dorsal. No known inciting trauma, but does catch herself from falling frequently. Has been doing some clothing sorting and folding over past couple days. Evaluation to date: none. Treatment to date: rest, otc naproxen. PMH, SH, Medications/Allergies: reviewed, on chart. ROS:  Constitutional: No fever, chills or weight loss  Respiratory: No cough, SOB   CV: No chest pain or Palpitations    Visit Vitals    /80 (BP 1 Location: Left arm, BP Patient Position: Sitting)    Pulse 88    Temp 97.8 °F (36.6 °C) (Oral)    Resp 12    Ht 5' 6\" (1.676 m)    Wt 137 lb 6.4 oz (62.3 kg)    SpO2 97%    BMI 22.18 kg/m2     Wt Readings from Last 3 Encounters:   04/04/18 137 lb 6.4 oz (62.3 kg)   03/09/18 137 lb (62.1 kg)   02/09/18 140 lb (63.5 kg)     Physical Examination: General appearance - alert, well appearing, and in mod discomfort  Mental status - alert, oriented to person, place, and time  Eyes - pupils equal and reactive, extraocular eye movements intact  ENT - bilateral external ears and nose normal. Normal lips  Extremities - peripheral pulses normal, no pedal edema, no clubbing or cyanosis. R wrist with moderate edema and erythema, with warmth and marked superficial ttp to the volar wrist and 1st Aia 16 area, but also sore along the EHL and AHL tendons. No bony deformity. Equivocal grind, pt is very tender with any motion of the thumb, but no laxity to the collaterals of the 1st MCP. A/P:  R wrist and thumb pain, c/w acute tendinitis, but ddx also includes acute gout, fx. Tx with toradol 30mg IM + decadron 2mg IM now, ace wrap and ice pack applied with teaching. Check Xray. F/U if not improving or sig abn on xray.

## 2018-04-04 NOTE — PROGRESS NOTES
1. Have you been to the ER, urgent care clinic since your last visit? Hospitalized since your last visit? No    2. Have you seen or consulted any other health care providers outside of the [facility] since your last visit? Include any pap smears or colon screening.  No

## 2018-04-04 NOTE — MR AVS SNAPSHOT
[address] [facility] 
 
 
 [address] [id number] [facility] [address] [phone] Patient: Colleen Valderrama MRN: [id number] Visit Information Date & Time Provider Department Dept. Phone Encounter #  
 4/4/2018  3:00 PM Selena Tejada MD [facility] [id number] Follow-up Instructions Return if symptoms worsen or fail to improve. Your Appointments 4/20/2018  1:30 PM  
Follow Up with Darrell Vasques MD  
[facility]) Appt Note: 6 month f/u  
 [address] [facility] [address] [id number] 570 [facility] 9/10/2018  1:00 PM  
ESTABLISHED PATIENT with Selena Tejada MD  
[address] ([facility]) Appt Note: 6 mo f/u  
 [address] [address] [phone]  
  
   
 [address] [address] Upcoming Health Maintenance Date Due  
 GLAUCOMA SCREENING Q2Y 2/3/2018 MEDICARE YEARLY EXAM 3/10/2019 DTaP/Tdap/Td series (2 - Td) 3/9/2028 Allergies as of 4/4/2018  Review Complete On: 4/4/2018 By: oJse C Rodriguez LPN Severity Noted Reaction Type Reactions Contrast Agent [Iodine]  03/02/2015    Unknown (comments) Doxycycline  03/02/2015    Unknown (comments) Erythromycin  03/02/2015    Unknown (comments) Macrodantin [Nitrofurantoin Macrocrystalline]  03/02/2015    Unknown (comments) Sulfa (Sulfonamide Antibiotics)  03/02/2015    Hives Eyes turned red Tamoxifen  04/07/2017    Other (comments) Current Immunizations  Reviewed on 10/26/2017 Name Date Influenza High Dose Vaccine PF 9/8/2017, 9/2/2016, 10/1/2015 Influenza Vaccine 10/17/2014 Pneumococcal Conjugate (PCV-13) 9/2/2016 Pneumococcal Vaccine (Unspecified Type) 10/1/2009 Td 6/1/2008 Zoster Vaccine, Live 6/1/2007 Not reviewed this visit You Were Diagnosed With   
  
 Codes Comments Wrist pain, acute, right    -  Primary ICD-10-CM: M25.531 ICD-9-CM: 719.43 Enthesopathy of hand     ICD-10-CM: M77.9 ICD-9-CM: 726.4 Vitals BP Pulse Temp Resp Height(growth percentile) Weight(growth percentile) 120/80 (BP 1 Location: Left arm, BP Patient Position: Sitting) 88 97.8 °F (36.6 °C) (Oral) 12 5' 6\" (1.676 m) 137 lb 6.4 oz (62.3 kg) SpO2 BMI OB Status Smoking Status 97% 22.18 kg/m2 Hysterectomy Never Smoker BMI and BSA Data Body Mass Index Body Surface Area  
 22.18 kg/m 2 1.7 m 2 Preferred Pharmacy Pharmacy Name Phone [address], [address] AT [facility] OF  3 & [facility]. Laurent Sims [phone] Your Updated Medication List  
  
   
This list is accurate as of 4/4/18  4:21 PM.  Always use your most recent med list.  
  
  
  
  
 aspirin delayed-release 81 mg tablet Take  by mouth daily. bisoprolol 10 mg tablet Commonly known as:  Lavonne Skipper Take 1 Tab by mouth daily. Indications: thyroid, pressure, heart  
  
 dexamethasone 4 mg/mL injection Commonly known as:  DECADRON  
0.5 mL by IntraMUSCular route once for 1 dose. dilTIAZem  mg XR capsule Commonly known as:  DILACOR XR Take 1 Cap by mouth daily. Indications: pressure and heart  
  
 glucosamine-chondroitin 750-600 mg Tab Take  by mouth two (2) times a day.  
  
 ketorolac 30 mg/mL (1 mL) injection Commonly known as:  TORADOL  
1 mL by IntraVENous route once for 1 dose. methIMAzole 5 mg tablet Commonly known as:  TAPAZOLE Take one tablet on Monday, Wednesday, Friday for thyroid  
  
 naproxen 500 mg tablet Commonly known as:  NAPROSYN Take 1 Tab by mouth two (2) times daily as needed. Indications: pain  
  
 omeprazole 40 mg capsule Commonly known as:  PRILOSEC  
TAKE 1 CAPSULE BY MOUTH DAILY raloxifene 60 mg tablet Commonly known as:  EVISTA TAKE 1 TABLET BY MOUTH DAILY  
  
 STOOL SOFTENER 100 mg capsule Generic drug:  docusate sodium Take 100 mg by mouth three (3) times daily as needed. VITAMIN B-12 1,000 mcg tablet Generic drug:  cyanocobalamin Take 1,000 mcg by mouth daily. Prescriptions Sent to Pharmacy Refills  
 naproxen (NAPROSYN) 500 mg tablet 1 Sig: Take 1 Tab by mouth two (2) times daily as needed. Indications: pain  
 Class: Normal  
 Pharmacy: [facility], [address] [facility] of  [facility] #: [phone] Route: Oral  
  
We Performed the Following DEXAMETHASONE SODIUM PHOSPHATE INJECTION 1 MG [ [facility]] KETOROLAC TROMETHAMINE INJ [ [facility]] AR THER/PROPH/DIAG INJECTION, SUBCUT/IM [id number] CPT(R)] AR THER/PROPH/DIAG INJECTION, SUBCUT/IM [id number] CPT(R)] Follow-up Instructions Return if symptoms worsen or fail to improve. To-Do List   
 04/04/2018 Imaging:  XR WRIST RT AP/LAT Patient Instructions Wrist Sprain: Care Instructions Your Care Instructions Your wrist hurts because you have stretched or torn ligaments, which connect the bones in your wrist. 
Wrist sprains usually take from 2 to 10 weeks to heal, but some take longer. Usually, the more pain you have, the more severe your wrist sprain is and the longer it will take to heal. You can heal faster and regain strength in your wrist with good home treatment. Follow-up care is a key part of your treatment and safety. Be sure to make and go to all appointments, and call your doctor if you are having problems. It's also a good idea to know your test results and keep a list of the medicines you take. How can you care for yourself at home? · Prop up your arm on a pillow when you ice it or anytime you sit or lie down for the next 3 days.  Try to keep your wrist above the level of your heart. This will help reduce swelling. · Put ice or cold packs on your wrist for 10 to 20 minutes at a time. Try to do this every 1 to 2 hours for the next 3 days (when you are awake) or until the swelling goes down. Put a thin cloth between the ice pack and your skin. · After 2 or 3 days, if your swelling is gone, apply a heating pad set on low or a warm cloth to your wrist. This helps keep your wrist flexible. Some doctors suggest that you go back and forth between hot and cold. · If you have an elastic bandage, keep it on for the next 24 to 36 hours. The bandage should be snug but not so tight that it causes numbness or tingling. To rewrap the wrist, wrap the bandage around the hand a few times, beginning at the fingers. Then wrap it around the hand between the thumb and index finger, ending by circling the wrist several times. · If your doctor gave you a splint or brace, wear it as directed to protect your wrist until it has healed. · Take pain medicines exactly as directed. ¨ If the doctor gave you a prescription medicine for pain, take it as prescribed. ¨ If you are not taking a prescription pain medicine, ask your doctor if you can take an over-the-counter medicine. · Try not to use your injured wrist and hand. When should you call for help? Call your doctor now or seek immediate medical care if: 
? · Your hand or fingers are cool or pale or change color. ? Watch closely for changes in your health, and be sure to contact your doctor if: 
? · Your pain gets worse. ? · Your wrist has not improved after 1 week. Where can you learn more? Go to http://frandy-josé miguel.info/. Enter G541 in the search box to learn more about \"Wrist Sprain: Care Instructions. \" Current as of: March 21, 2017 Content Version: 11.4 © 6095-6652 [employer].  Care instructions adapted under license by [employer] (which disclaims liability or warranty for this information). If you have questions about a medical condition or this instruction, always ask your healthcare professional. [address] any warranty or liability for your use of this information. Introducing \[facility] [place] [facility]\"" & HEALTH SERVICES! Dear Abad Grimes: 
Thank you for requesting a [employer] account. Our records indicate that you already have an active [employer] account. You can access your account anytime at [email or web address] Did you know that you can access your hospital and ER discharge instructions at any time in [employer]? You can also review all of your test results from your hospital stay or ER visit. Additional Information If you have questions, please visit the Frequently Asked Questions section of the [employer] website at [email or web address] Remember, [employer] is NOT to be used for urgent needs. For medical emergencies, dial 911. Now available from your iPhone and Android! Please provide this summary of care documentation to your next provider. Your primary care clinician is listed as Nayeli Barnett. If you have any questions after today's visit, please call [phone].

## 2018-04-04 NOTE — PATIENT INSTRUCTIONS
Wrist Sprain: Care Instructions  Your Care Instructions    Your wrist hurts because you have stretched or torn ligaments, which connect the bones in your wrist.  Wrist sprains usually take from 2 to 10 weeks to heal, but some take longer. Usually, the more pain you have, the more severe your wrist sprain is and the longer it will take to heal. You can heal faster and regain strength in your wrist with good home treatment. Follow-up care is a key part of your treatment and safety. Be sure to make and go to all appointments, and call your doctor if you are having problems. It's also a good idea to know your test results and keep a list of the medicines you take. How can you care for yourself at home? · Prop up your arm on a pillow when you ice it or anytime you sit or lie down for the next 3 days. Try to keep your wrist above the level of your heart. This will help reduce swelling. · Put ice or cold packs on your wrist for 10 to 20 minutes at a time. Try to do this every 1 to 2 hours for the next 3 days (when you are awake) or until the swelling goes down. Put a thin cloth between the ice pack and your skin. · After 2 or 3 days, if your swelling is gone, apply a heating pad set on low or a warm cloth to your wrist. This helps keep your wrist flexible. Some doctors suggest that you go back and forth between hot and cold. · If you have an elastic bandage, keep it on for the next 24 to 36 hours. The bandage should be snug but not so tight that it causes numbness or tingling. To rewrap the wrist, wrap the bandage around the hand a few times, beginning at the fingers. Then wrap it around the hand between the thumb and index finger, ending by circling the wrist several times. · If your doctor gave you a splint or brace, wear it as directed to protect your wrist until it has healed. · Take pain medicines exactly as directed. ¨ If the doctor gave you a prescription medicine for pain, take it as prescribed.   ¨ If you are not taking a prescription pain medicine, ask your doctor if you can take an over-the-counter medicine. · Try not to use your injured wrist and hand. When should you call for help? Call your doctor now or seek immediate medical care if:  ? · Your hand or fingers are cool or pale or change color. ? Watch closely for changes in your health, and be sure to contact your doctor if:  ? · Your pain gets worse. ? · Your wrist has not improved after 1 week. Where can you learn more? Go to http://frandy-josé miguel.info/. Enter G541 in the search box to learn more about \"Wrist Sprain: Care Instructions. \"  Current as of: March 21, 2017  Content Version: 11.4  © 2004-0504 Healthwise, Incorporated. Care instructions adapted under license by [employer] (which disclaims liability or warranty for this information). If you have questions about a medical condition or this instruction, always ask your healthcare professional. [address] any warranty or liability for your use of this information.

## 2018-04-05 ENCOUNTER — TELEPHONE (OUTPATIENT)
Dept: FAMILY MEDICINE CLINIC | Age: 83
End: 2018-04-05

## 2018-04-20 ENCOUNTER — OFFICE VISIT (OUTPATIENT)
Dept: ENDOCRINOLOGY | Age: 83
End: 2018-04-20

## 2018-04-20 VITALS
BODY MASS INDEX: 22.21 KG/M2 | SYSTOLIC BLOOD PRESSURE: 164 MMHG | DIASTOLIC BLOOD PRESSURE: 82 MMHG | HEART RATE: 77 BPM | WEIGHT: 138.2 LBS | HEIGHT: 66 IN

## 2018-04-20 DIAGNOSIS — E05.20 TOXIC MULTINODULAR GOITER: Primary | ICD-10-CM

## 2018-04-20 DIAGNOSIS — I10 ESSENTIAL HYPERTENSION: ICD-10-CM

## 2018-04-20 DIAGNOSIS — E21.3 HYPERPARATHYROIDISM (HCC): ICD-10-CM

## 2018-04-20 NOTE — MR AVS SNAPSHOT
[address] [facility] [address] [address] [phone] Patient: Jb Knight MRN: [id number] Visit Information Date & Time Provider Department Dept. Phone Encounter #  
 4/20/2018  1:30 PM Nikhil Cherry, [address] [place] [address] Diabetes and Endocrinology [phone] [id number] Follow-up Instructions Return in about 5 months (around 9/20/2018). Your Appointments 9/10/2018  1:00 PM  
[facility]ABL[facility] ([facility]) Appt Note: 6 mo f/u  
 [address] [address] [phone]  
  
   
 [address] [place] [address],[address] [address] Upcoming Health Maintenance Date Due  
 GLAUCOMA SCREENING Q2Y 2/3/2018 MEDICARE YEARLY EXAM 3/10/2019 DTaP/Tdap/Td series (2 - Td) 3/9/2028 Allergies as of 4/20/2018  Review Complete On: 4/20/2018 By: Nikhil Cherry MD  
  
 Severity Noted Reaction Type Reactions Contrast Agent [Iodine]  03/02/2015    Unknown (comments) Doxycycline  03/02/2015    Unknown (comments) Erythromycin  03/02/2015    Unknown (comments) Macrodantin [Nitrofurantoin Macrocrystalline]  03/02/2015    Unknown (comments) Sulfa (Sulfonamide Antibiotics)  03/02/2015    Hives Eyes turned red Tamoxifen  04/07/2017    Other (comments) Current Immunizations  Reviewed on 10/26/2017 Name Date Influenza High Dose Vaccine PF 9/8/2017, 9/2/2016, 10/1/2015 Influenza Vaccine 10/17/2014 Pneumococcal Conjugate (PCV-13) 9/2/2016 Pneumococcal Vaccine (Unspecified Type) 10/1/2009 Td 6/1/2008 Zoster Vaccine, Live 6/1/2007 Not reviewed this visit You Were Diagnosed With   
  
 Codes Comments Toxic multinodular goiter    -  Primary ICD-10-CM: E05.20 ICD-9-CM: 242.20 Hyperparathyroidism ([address]     ICD-10-CM: E21.3 ICD-9-CM: 252.00   
 Essential hypertension     ICD-10-CM: I10 
ICD-9-CM: 401.9 Vitals BP Pulse Height(growth percentile) Weight(growth percentile) BMI OB Status 164/82 77 5' 6\" (1.676 m) 138 lb 3.2 oz (62.7 kg) 22.31 kg/m2 Hysterectomy Smoking Status Never Smoker Vitals History BMI and BSA Data Body Mass Index Body Surface Area  
 22.31 kg/m 2 1.71 m 2 Preferred Pharmacy Pharmacy Name Phone [address], [address] AT [facility] 3 & [facility] [address] [phone] Your Updated Medication List  
  
   
This list is accurate as of 4/20/18  2:32 PM.  Always use your most recent med list.  
  
  
  
  
 aspirin delayed-release 81 mg tablet Take  by mouth daily. bisoprolol 10 mg tablet Commonly known as:  Rosita Jason Take 1 Tab by mouth daily. Indications: thyroid, pressure, heart  
  
 dilTIAZem  mg XR capsule Commonly known as:  DILACOR XR Take 1 Cap by mouth daily. Indications: pressure and heart  
  
 glucosamine-chondroitin 750-600 mg Tab Take  by mouth two (2) times a day. methIMAzole 5 mg tablet Commonly known as:  TAPAZOLE Take one tablet on Monday, Wednesday, Friday for thyroid  
  
 naproxen 500 mg tablet Commonly known as:  NAPROSYN Take 1 Tab by mouth two (2) times daily as needed. Indications: pain  
  
 omeprazole 40 mg capsule Commonly known as:  PRILOSEC  
TAKE 1 CAPSULE BY MOUTH DAILY  
  
 raloxifene 60 mg tablet Commonly known as:  EVISTA TAKE 1 TABLET BY MOUTH DAILY  
  
 STOOL SOFTENER 100 mg capsule Generic drug:  docusate sodium Take 100 mg by mouth three (3) times daily as needed. VITAMIN B-12 1,000 mcg tablet Generic drug:  cyanocobalamin Take 1,000 mcg by mouth daily. We Performed the Following COLLECTION VENOUS BLOOD,VENIPUNCTURE [id number] CPT(R)] METABOLIC PANEL, BASIC [46464 CPT(R)] DE HANDLG&/OR CONVEY OF SPEC FOR TR OFFICE TO LAB [44797 CPT(R)] T4, FREE [id number] CPT(R)] TSH 3RD GENERATION [41748 CPT(R)] Follow-up Instructions Return in about 5 months (around 9/20/2018). Patient Instructions 1) I will repeat your thyroid tests to see if you need a change in your dose of methimazole. Keep taking 1 tab 3x/week until you hear back from me. 2) I will repeat your calcium and kidney function. 3) Try taking the diltiazem in the morning and the zebeta in the evening to see if you can tolerate both pills in your system without dizziness. Start monitoring blood pressure about 2 times per week at alternating times either in the morning or evening after resting for 5 minutes and sitting upright in a chair with your arm at heart level. Please let me know if you are having readings over 140 on the top number or 90 on the bottom number after 2 weeks on this new regimen. Introducing [facility] & HEALTH SERVICES! Dear Imelda Mckeon: 
Thank you for requesting a [employer] account. Our records indicate that you already have an active [employer] account. You can access your account anytime at [email or web address] Did you know that you can access your hospital and ER discharge instructions at any time in [employer]? You can also review all of your test results from your hospital stay or ER visit. Additional Information If you have questions, please visit the Frequently Asked Questions section of the [employer] website at [email or web address] Remember, [employer] is NOT to be used for urgent needs. For medical emergencies, dial 911. Now available from your iPhone and Android! Please provide this summary of care documentation to your next provider. Your primary care clinician is listed as Erlin Barnett. If you have any questions after today's visit, please call [phone].

## 2018-04-20 NOTE — PATIENT INSTRUCTIONS
1) I will repeat your thyroid tests to see if you need a change in your dose of methimazole. Keep taking 1 tab 3x/week until you hear back from me. 2) I will repeat your calcium and kidney function. 3) Try taking the diltiazem in the morning and the zebeta in the evening to see if you can tolerate both pills in your system without dizziness. Start monitoring blood pressure about 2 times per week at alternating times either in the morning or evening after resting for 5 minutes and sitting upright in a chair with your arm at heart level. Please let me know if you are having readings over 140 on the top number or 90 on the bottom number after 2 weeks on this new regimen.

## 2018-04-20 NOTE — PROGRESS NOTES
Chief Complaint   Patient presents with    Thyroid Problem     pcp and pharmacy confirmed     History of Present Illness: Leanne Gutierres is a 80 y.o. female here for follow up of thyroid. Weight up 3 lbs since last visit in 10/17. She tried increasing the zebeta to 10 mg bid but per Dr. Zenia Vieira note in 10/17, her rash worsened so she went back to taking 1 tab daily and he added diltiazem 120 mg daily. However, she states she is not taking her pills regularly because of fear of side effects including dizziness. She has tried taking both BP pills together in the morning but had a lot of drowsiness when she did this. She was started on evista in 12/17 and has been taking 4x/week on the opposite days of methimazole to avoid having too much med in her system at one time especially as she does continue to have intermittent outbreaks. Has had 4 episodes of sharp chest pain just for a few seconds since 1/18 and just stopped what she was doing and rested and it went away on its own. Didn't notice any palpitations. May have mild tremor on occasion. Still has constipation that is unchanged and takes miralax every 4 days to help. No hair loss, some brittle nails. No heat intolerance but may get a little chilly at night. Will coordinate lab draws with Dr. Jacque Moreau to have him repeat her labs when he sees her next on 9/10/18 (sent him a future staff message that will come to his box that day to draw necessary labs). Current Outpatient Prescriptions   Medication Sig    naproxen (NAPROSYN) 500 mg tablet Take 1 Tab by mouth two (2) times daily as needed. Indications: pain    omeprazole (PRILOSEC) 40 mg capsule TAKE 1 CAPSULE BY MOUTH DAILY    bisoprolol (ZEBETA) 10 mg tablet Take 1 Tab by mouth daily. Indications: thyroid, pressure, heart    raloxifene (EVISTA) 60 mg tablet TAKE 1 TABLET BY MOUTH DAILY    dilTIAZem XR (DILACOR XR) 120 mg XR capsule Take 1 Cap by mouth daily.  Indications: pressure and heart    methIMAzole (TAPAZOLE) 5 mg tablet Take one tablet on Monday, Wednesday, Friday for thyroid    docusate sodium (STOOL SOFTENER) 100 mg capsule Take 100 mg by mouth three (3) times daily as needed.  aspirin delayed-release 81 mg tablet Take  by mouth daily.  GLUCOSAMINE HCL/CHONDR PRESCOTT A NA (GLUCOSAMINE-CHONDROITIN) 750-600 mg tab Take  by mouth two (2) times a day.  cyanocobalamin (VITAMIN B-12) 1,000 mcg tablet Take 1,000 mcg by mouth daily. No current facility-administered medications for this visit. Allergies   Allergen Reactions    Contrast Agent [Iodine] Unknown (comments)    Doxycycline Unknown (comments)    Erythromycin Unknown (comments)    Macrodantin [Nitrofurantoin Macrocrystalline] Unknown (comments)    Sulfa (Sulfonamide Antibiotics) Hives     Eyes turned red    Tamoxifen Other (comments)     Review of Systems:  - Cardiovascular: (+) chest pain  - Neurological: some tremors  - Integumentary: skin is normal    Physical Examination:  Blood pressure 164/82, pulse 77, height 5' 6\" (1.676 m), weight 138 lb 3.2 oz (62.7 kg). - General: pleasant, no distress, good eye contact   - Neck: small goiter  - Cardiovascular: regular, normal rate, nl s1 and s2, no m/r/g   - Respiratory: clear to auscultation bilaterally   - Integumentary: skin is normal, no edema  - Neurological: reflexes 2+ at biceps, mild tremor  - Psychiatric: normal mood and affect    Data Reviewed:   - none new for review    Assessment/Plan:     1. Toxic multinodular goiter:  In March 2015, she was found to have a low TSH of 0.16 and this was just watched over time and her TSH slowly drifted down over the next year and was 0.06 in 4/16. Had a thyroid ultrasound in 6/16 at \[facility]\"" that showed a multinodular goiter with largest nodule of 2.9 cm in the right mid lobe.  Her TSH was down to 0.05 in 10/16 and Dr. Kristy Cerda decided to give a trial of methimazole 5 mg once daily and with taking this she started feeling better and energy was improving and tremors were improving. Started to gain some of the weight back she had lost which she estimated to be over 15 lbs. However, she developed some watering and redness of her eyes and small areas of intermittent rash on the arms and legs in Jan 2017 and was advised to stop the methimazole and her symptoms went away. Her TSH was back to normal at 0.68 in 1/17. With coming off the methimazole she started feeling worse again and was referred to Dr. Adrian Bird whom she saw on 4/20/17. He repeated labs that showed her TSH was down to 0.15 with a normal FT4 of 1.27 and T3 of 127. He ordered a thyroid ultrasound that showed a right parathyroid atypical mass vs lymph node and was sent for biopsy of her right lobe nodule and this was done on 5/1/17 and came back benign and she was sent to me for further evaluation. It sounds like she has developed hyperthyroidism from her nodules and with the methimazole, she was able to normalize her TSH and symptoms were improving. It's unclear if her mild rash and watering and redness of her eyes was truly from the methimazole and she was willing to restart the methimazole at a lower dose of 5 mg 4x/week at her first visit with me in 5/17. TSH 0.88 in 7/17 but had more rash on her back so decreased to 3x/week and rash improved and TSH 0.57 in 10/17 so will keep her dose the same. If she has any worsening rash, we can consider a trial of PTU vs pursuing radioactive iodine. I would not favor surgery given her age and other co-morbidities. - cont methimazole 5 mg 3x/week  - check TSH and free T4 today and prior to next visit at Dr. Natalie Carter office     2. Hyperparathyroidism [facility]): Has had high calcium levels in the 11-11.7 range over the past 2 years in our system with a PTH of 88 in 12/15 and has been 36-53 range on repeat testing. However, has been told at least 15 years ago that her calcium level was high. Did have one kidney stone years ago.  Is on vitamin D 1000 units daily but no calcium supplements or mvi. Vitamin D was 57 in 7/15. Her 24 hour urine calcium was low at 49 in 7/15. It's possible the hyperthyroidism may be exacerbating the hypercalcemia so hopefully with addressing this as above, this will help with the calcium. I had her stop the vitamin D to avoid hyperabsorption of dietary calcium in 5/17 and calcium was 11.6 in 7/17 but down to 10.9 in 10/17. Treatment of this condition is surgical and would not want to pursue this unless her calcium kecia over 12.  - drink plenty of water   - check bmp today and prior to next visit      3. Hypertension: BP > 140/90 but not taking dilt and zebeta as directed for fear of side effect of dizziness. Will split the doses to help avoid this symptom. - take diltiazem 120 mg in the morning  - take zebeta 10 mg at night  - monitor home blood pressure readings        Patient Instructions   1) I will repeat your thyroid tests to see if you need a change in your dose of methimazole. Keep taking 1 tab 3x/week until you hear back from me. 2) I will repeat your calcium and kidney function. 3) Try taking the diltiazem in the morning and the zebeta in the evening to see if you can tolerate both pills in your system without dizziness. Start monitoring blood pressure about 2 times per week at alternating times either in the morning or evening after resting for 5 minutes and sitting upright in a chair with your arm at heart level. Please let me know if you are having readings over 140 on the top number or 90 on the bottom number after 2 weeks on this new regimen. Follow-up Disposition:  Return in about 5 months (around 9/20/2018).     Copy sent to:  Dr. Gauri Madrid via [facility]    Lab follow up: 4/22/18    Sent her the following message in a letter and had Ingrid call her with her lab results:    Resulted Orders   T4, FREE   Result Value Ref Range    T4, Free 1.40 0.82 - 1.77 ng/dL    Narrative    Performed at: [address]  [id number]  : Tosha Nunez MD, Phone:  [phone]   TSH 3RD GENERATION   Result Value Ref Range    TSH 0.270 (L) 0.450 - 4.500 uIU/mL    Narrative    Performed at:  [address]  [id number]  : Tosha Nunez MD, Phone:  [phone]   METABOLIC PANEL, BASIC   Result Value Ref Range    Glucose 92 65 - 99 mg/dL      Comment:      Specimen received in contact with cells. No visible hemolysis  present. However GLUC may be decreased and K increased. Clinical  correlation indicated. BUN 18 8 - 27 mg/dL    Creatinine 1.06 (H) 0.57 - 1.00 mg/dL    GFR est non-AA 48 (L) >59 mL/min/1.73    GFR est AA 56 (L) >59 mL/min/1.73    BUN/Creatinine ratio 17 12 - 28    Sodium 145 (H) 134 - 144 mmol/L    Potassium 4.9 3.5 - 5.2 mmol/L      Comment:      Specimen received in contact with cells. No visible hemolysis  present. However GLUC may be decreased and K increased. Clinical  correlation indicated. Chloride 105 96 - 106 mmol/L    CO2 25 18 - 29 mmol/L    Calcium 10.9 (H) 8.7 - 10.3 mg/dL    Narrative    Performed at:  [address]  [id number]  : Tosha Nunez MD, Phone:  [phone]   CKD REPORT   Result Value Ref Range    Interpretation Note       Comment:      Supplemental report is available. Narrative    Performed at:  [address]  [id number]  : Jania Monique PhD, Phone:  [phone]       1) Your TSH is a thyroid test.  Your level is 0.27 which is slightly low and below goal of 0.5-2.0. This test goes opposite of your thyroid dose and suggests your dose of methimazole is not quite enough. I will increase your dose back to 1 tab 4x/week on Mon, Wed, Fri and Saturday and have sent a new prescription to your local pharmacy for this higher dose.   Please watch for any increase in rash with this slight dose increase and let me know if this occurs. 2) Your calcium is still high at 10.9 but stable from last check. 3) Your creatinine (kidney test) is slightly elevated at 1.06 (normal is under 1.0) but stable from 1.07 at your last check.

## 2018-04-21 LAB
BUN SERPL-MCNC: 18 MG/DL (ref 8–27)
BUN/CREAT SERPL: 17 (ref 12–28)
CALCIUM SERPL-MCNC: 10.9 MG/DL (ref 8.7–10.3)
CHLORIDE SERPL-SCNC: 105 MMOL/L (ref 96–106)
CO2 SERPL-SCNC: 25 MMOL/L (ref 18–29)
CREAT SERPL-MCNC: 1.06 MG/DL (ref 0.57–1)
GFR SERPLBLD CREATININE-BSD FMLA CKD-EPI: 48 ML/MIN/1.73
GFR SERPLBLD CREATININE-BSD FMLA CKD-EPI: 56 ML/MIN/1.73
GLUCOSE SERPL-MCNC: 92 MG/DL (ref 65–99)
INTERPRETATION: NORMAL
POTASSIUM SERPL-SCNC: 4.9 MMOL/L (ref 3.5–5.2)
SODIUM SERPL-SCNC: 145 MMOL/L (ref 134–144)
T4 FREE SERPL-MCNC: 1.4 NG/DL (ref 0.82–1.77)
TSH SERPL DL<=0.005 MIU/L-ACNC: 0.27 UIU/ML (ref 0.45–4.5)

## 2018-04-22 ENCOUNTER — TELEPHONE (OUTPATIENT)
Dept: ENDOCRINOLOGY | Age: 83
End: 2018-04-22

## 2018-04-22 RX ORDER — METHIMAZOLE 5 MG/1
TABLET ORAL
Qty: 52 TAB | Refills: 3 | Status: SHIPPED | OUTPATIENT
Start: 2018-04-22 | End: 2018-10-30 | Stop reason: SDUPTHER

## 2018-04-22 NOTE — TELEPHONE ENCOUNTER
Please call her with her lab results and let her know I sent her the following message in a lab letter:    1) Your TSH is a thyroid test.  Your level is 0.27 which is slightly low and below goal of 0.5-2.0. This test goes opposite of your thyroid dose and suggests your dose of methimazole is not quite enough. I will increase your dose back to 1 tab 4x/week on Mon, Wed, Fri and Saturday and have sent a new prescription to your local pharmacy for this higher dose. Please watch for any increase in rash with this slight dose increase and let me know if this occurs. 2) Your calcium is still high at 10.9 but stable from last check. 3) Your creatinine (kidney test) is slightly elevated at 1.06 (normal is under 1.0) but stable from 1.07 at your last check.

## 2018-04-25 ENCOUNTER — TELEPHONE (OUTPATIENT)
Dept: ENDOCRINOLOGY | Age: 83
End: 2018-04-25

## 2018-04-25 DIAGNOSIS — I10 ESSENTIAL HYPERTENSION: ICD-10-CM

## 2018-04-25 DIAGNOSIS — E05.20 TOXIC MULTINODULAR GOITER: ICD-10-CM

## 2018-04-25 RX ORDER — DILTIAZEM HYDROCHLORIDE 120 MG/1
240 CAPSULE, EXTENDED RELEASE ORAL DAILY
Qty: 30 CAP | Refills: 11
Start: 2018-04-25 | End: 2018-05-07 | Stop reason: SDUPTHER

## 2018-04-25 NOTE — TELEPHONE ENCOUNTER
Patient called to give bp readings. She stated that on 4/21 at pharmacy it was 181/110 and last night it was 182/108. She stated the pharmacist wanted to call the rescue squad but did not however she was instructed to call the doctor today. Patient stated she has been taking her bp medication as directed. Patient would like to know what she should do.

## 2018-04-25 NOTE — TELEPHONE ENCOUNTER
Patient is requesting a call back as soon as possible. She stated that her blood pressure last night was 182/108. She stated that she was at [facility] when she took it and the pharmacist suggested calling the ambulance. Patient stated that she is very concerned. She can be reached at [phone].

## 2018-04-25 NOTE — TELEPHONE ENCOUNTER
Please first confirm has she actually been taking the diltiazem every morning and zebeta every night since her visit on 4/20/18 as previously she was not getting both pills everyday. If she has not been doing this as she supposed to, have her start doing this first and give me another update next week.   If she is doing this as directed, then have her take 2 of the diltiazem tabs in the morning for the next week and give me an update next week and continue 1 tab of zebeta at night

## 2018-04-25 NOTE — TELEPHONE ENCOUNTER
Pt notified of message per Dr. Abdulkadir Mejia and voiced understanding of what was read to her. As noted patient has been taking the meds as directed since 4/21 and will increase the diltiazem to two in am and one zebata at night.

## 2018-05-03 ENCOUNTER — OFFICE VISIT (OUTPATIENT)
Dept: FAMILY MEDICINE CLINIC | Age: 83
End: 2018-05-03

## 2018-05-03 VITALS
HEIGHT: 66 IN | BODY MASS INDEX: 22.69 KG/M2 | SYSTOLIC BLOOD PRESSURE: 130 MMHG | DIASTOLIC BLOOD PRESSURE: 80 MMHG | OXYGEN SATURATION: 97 % | HEART RATE: 81 BPM | TEMPERATURE: 98.1 F | RESPIRATION RATE: 20 BRPM | WEIGHT: 141.2 LBS

## 2018-05-03 DIAGNOSIS — C44.712 BASAL CELL CARCINOMA (BCC) OF SKIN OF RIGHT LOWER EXTREMITY INCLUDING HIP: ICD-10-CM

## 2018-05-03 RX ORDER — CEFUROXIME AXETIL 500 MG/1
500 TABLET ORAL 2 TIMES DAILY
Qty: 14 TAB | Refills: 0 | Status: SHIPPED | OUTPATIENT
Start: 2018-05-03 | End: 2018-05-10

## 2018-05-03 NOTE — PATIENT INSTRUCTIONS
Cellulitis: Care Instructions  Your Care Instructions    Cellulitis is a skin infection. It often occurs after a break in the skin from a scrape, cut, bite, or puncture, or after a rash. The doctor has checked you carefully, but problems can develop later. If you notice any problems or new symptoms, get medical treatment right away. Follow-up care is a key part of your treatment and safety. Be sure to make and go to all appointments, and call your doctor if you are having problems. It's also a good idea to know your test results and keep a list of the medicines you take. How can you care for yourself at home? · Take your antibiotics as directed. Do not stop taking them just because you feel better. You need to take the full course of antibiotics. · Prop up the infected area on pillows to reduce pain and swelling. Try to keep the area above the level of your heart as often as you can. · If your doctor told you how to care for your wound, follow your doctor's instructions. If you did not get instructions, follow this general advice:  ¨ Wash the wound with clean water 2 times a day. Don't use hydrogen peroxide or alcohol, which can slow healing. ¨ You may cover the wound with a thin layer of petroleum jelly, such as Vaseline, and a nonstick bandage. ¨ Apply more petroleum jelly and replace the bandage as needed. · Be safe with medicines. Take pain medicines exactly as directed. ¨ If the doctor gave you a prescription medicine for pain, take it as prescribed. ¨ If you are not taking a prescription pain medicine, ask your doctor if you can take an over-the-counter medicine. To prevent cellulitis in the future  · Try to prevent cuts, scrapes, or other injuries to your skin. Cellulitis most often occurs where there is a break in the skin. · If you get a scrape, cut, mild burn, or bite, wash the wound with clean water as soon as you can to help avoid infection.  Don't use hydrogen peroxide or alcohol, which can slow healing. · If you have swelling in your legs (edema), support stockings and good skin care may help prevent leg sores and cellulitis. · Take care of your feet, especially if you have diabetes or other conditions that increase the risk of infection. Wear shoes and socks. Do not go barefoot. If you have athlete's foot or other skin problems on your feet, talk to your doctor about how to treat them. When should you call for help? Call your doctor now or seek immediate medical care if:  ? · You have signs that your infection is getting worse, such as:  ¨ Increased pain, swelling, warmth, or redness. ¨ Red streaks leading from the area. ¨ Pus draining from the area. ¨ A fever. ? · You get a rash. ? Watch closely for changes in your health, and be sure to contact your doctor if:  ? · You are not getting better after 1 day (24 hours). ? · You do not get better as expected. Where can you learn more? Go to http://frandy-josé miguel.info/. Ananth Pedraza in the search box to learn more about \"Cellulitis: Care Instructions. \"  Current as of: October 13, 2016  Content Version: 11.4  © 4620-5655 [employer]. Care instructions adapted under license by [employer] (which disclaims liability or warranty for this information). If you have questions about a medical condition or this instruction, always ask your healthcare professional. [place] [address] any warranty or liability for your use of this information.

## 2018-05-03 NOTE — MR AVS SNAPSHOT
[address] 
[phone] Patient: Jacqueline Weir MRN: [id number] FDR:5/0/1217 Visit Information Date & Time Provider Department Dept. Phone Encounter #  
 5/3/2018 10:30 AM Payton Decker  [place] [phone] [id number] Follow-up Instructions Return in about 1 week (around 5/10/2018). Follow-up and Disposition History Your Appointments 9/10/2018  1:00 PM  
ESTABLISHED PATIENT with Payton Decker MD  
[address] ([facility]) Appt Note: 6 mo f/u  
 [id number] [facility],5Th Floor [address] [phone]  
  
   
 [address] [address] 9/24/2018  1:50 PM  
Follow Up with Rodney Cormier MD  
[facility] [facility]) Appt Note: f/u                       3 month thyroid One [facility] [address] [phone] [address] [place] [address] Upcoming Health Maintenance Date Due  
 GLAUCOMA SCREENING Q2Y 2/3/2018 Influenza Age 5 to Adult 8/1/2018 MEDICARE YEARLY EXAM 3/10/2019 DTaP/Tdap/Td series (2 - Td) 3/9/2028 Allergies as of 5/3/2018  Review Complete On: 5/3/2018 By: Payton Decker MD  
  
 Severity Noted Reaction Type Reactions Contrast Agent [Iodine]  03/02/2015    Unknown (comments) Doxycycline  03/02/2015    Unknown (comments) Erythromycin  03/02/2015    Unknown (comments) Macrodantin [Nitrofurantoin Macrocrystalline]  03/02/2015    Unknown (comments) Sulfa (Sulfonamide Antibiotics)  03/02/2015    Hives Eyes turned red Tamoxifen  04/07/2017    Other (comments) Current Immunizations  Reviewed on 10/26/2017 Name Date Influenza High Dose Vaccine PF 9/8/2017, 9/2/2016, 10/1/2015 Influenza Vaccine 10/17/2014 Pneumococcal Conjugate (PCV-13) 9/2/2016 Pneumococcal Vaccine (Unspecified Type) 10/1/2009 Td 6/1/2008 Zoster Vaccine, Live 6/1/2007 Not reviewed this visit You Were Diagnosed With   
  
 Codes Comments Postoperative wound infection, initial encounter    -  Primary ICD-10-CM: T81. 4XXA ICD-9-CM: 998.59 Basal cell carcinoma (BCC) of skin of right lower extremity including hip     ICD-10-CM: A41.915 ICD-9-CM: 173.71 Vitals BP Pulse Temp Resp Height(growth percentile) Weight(growth percentile) 130/80 (BP 1 Location: Right arm, BP Patient Position: Sitting) 81 98.1 °F (36.7 °C) (Oral) 20 5' 6\" (1.676 m) 141 lb 3.2 oz (64 kg) SpO2 BMI OB Status Smoking Status 97% 22.79 kg/m2 Hysterectomy Never Smoker BMI and BSA Data Body Mass Index Body Surface Area  
 22.79 kg/m 2 1.73 m 2 Preferred Pharmacy Pharmacy Name Phone [address], [address] AT [facility] OF  3 & [facility] Coni Duarte [phone] Your Updated Medication List  
  
   
This list is accurate as of 5/3/18 12:02 PM.  Always use your most recent med list.  
  
  
  
  
 aspirin delayed-release 81 mg tablet Take  by mouth daily. bisoprolol 10 mg tablet Commonly known as:  [place] Benedict Take 1 Tab by mouth daily. Indications: thyroid, pressure, heart  
  
 cefUROXime 500 mg tablet Commonly known as:  CEFTIN Take 1 Tab by mouth two (2) times a day for 7 days. CLARITIN LIQUI-GEL 10 mg Cap Generic drug:  loratadine Take 1 Tab by mouth daily as needed. dilTIAZem  mg XR capsule Commonly known as:  DILACOR XR Take 2 Caps by mouth daily. Dose change 4/25/18--updated med list--did not send prescription to the pharmacy  Indications: pressure and heart  
  
 glucosamine-chondroitin 750-600 mg Tab Take  by mouth two (2) times a day. methIMAzole 5 mg tablet Commonly known as:  TAPAZOLE  
 Take one tablet on Monday, Wednesday, Friday and Saturday for thyroid  
  
 naproxen 500 mg tablet Commonly known as:  NAPROSYN Take 1 Tab by mouth two (2) times daily as needed. Indications: pain  
  
 omeprazole 40 mg capsule Commonly known as:  PRILOSEC  
TAKE 1 CAPSULE BY MOUTH DAILY  
  
 raloxifene 60 mg tablet Commonly known as:  EVISTA TAKE 1 TABLET BY MOUTH DAILY  
  
 STOOL SOFTENER 100 mg capsule Generic drug:  docusate sodium Take 100 mg by mouth three (3) times daily as needed. VITAMIN B-12 1,000 mcg tablet Generic drug:  cyanocobalamin Take 1,000 mcg by mouth daily. Prescriptions Sent to Pharmacy Refills  
 cefUROXime (CEFTIN) 500 mg tablet 0 Sig: Take 1 Tab by mouth two (2) times a day for 7 days. Class: Normal  
 Pharmacy: [facility] [place] [address], [address] [place] [address] [address]  #: [phone] Route: Oral  
  
We Performed the Following ANAEROBIC/AEROBIC/GRAM STAIN [id number] CPT(R)] Follow-up Instructions Return in about 1 week (around 5/10/2018). Patient Instructions Cellulitis: Care Instructions Your Care Instructions Cellulitis is a skin infection. It often occurs after a break in the skin from a scrape, cut, bite, or puncture, or after a rash. The doctor has checked you carefully, but problems can develop later. If you notice any problems or new symptoms, get medical treatment right away. Follow-up care is a key part of your treatment and safety. Be sure to make and go to all appointments, and call your doctor if you are having problems. It's also a good idea to know your test results and keep a list of the medicines you take. How can you care for yourself at home? · Take your antibiotics as directed. Do not stop taking them just because you feel better. You need to take the full course of antibiotics. · Prop up the infected area on pillows to reduce pain and swelling. Try to keep the area above the level of your heart as often as you can. · If your doctor told you how to care for your wound, follow your doctor's instructions. If you did not get instructions, follow this general advice: ¨ Wash the wound with clean water 2 times a day. Don't use hydrogen peroxide or alcohol, which can slow healing. ¨ You may cover the wound with a thin layer of petroleum jelly, such as Vaseline, and a nonstick bandage. ¨ Apply more petroleum jelly and replace the bandage as needed. · Be safe with medicines. Take pain medicines exactly as directed. ¨ If the doctor gave you a prescription medicine for pain, take it as prescribed. ¨ If you are not taking a prescription pain medicine, ask your doctor if you can take an over-the-counter medicine. To prevent cellulitis in the future · Try to prevent cuts, scrapes, or other injuries to your skin. Cellulitis most often occurs where there is a break in the skin. · If you get a scrape, cut, mild burn, or bite, wash the wound with clean water as soon as you can to help avoid infection. Don't use hydrogen peroxide or alcohol, which can slow healing. · If you have swelling in your legs (edema), support stockings and good skin care may help prevent leg sores and cellulitis. · Take care of your feet, especially if you have diabetes or other conditions that increase the risk of infection. Wear shoes and socks. Do not go barefoot. If you have athlete's foot or other skin problems on your feet, talk to your doctor about how to treat them. When should you call for help? Call your doctor now or seek immediate medical care if: 
? · You have signs that your infection is getting worse, such as: 
¨ Increased pain, swelling, warmth, or redness. ¨ Red streaks leading from the area. ¨ Pus draining from the area. ¨ A fever. ? · You get a rash. ?Watch closely for changes in your health, and be sure to contact your doctor if: 
? · You are not getting better after 1 day (24 hours). ? · You do not get better as expected. Where can you learn more? Go to http://frandy-josé miguel.info/. Chuck Young in the search box to learn more about \"Cellulitis: Care Instructions. \" Current as of: October 13, 2016 Content Version: 11.4 © 3528-2721 [employer]. Care instructions adapted under license by [employer] (which disclaims liability or warranty for this information). If you have questions about a medical condition or this instruction, always ask your healthcare professional. [address] any warranty or liability for your use of this information. Introducing [facility] & HEALTH SERVICES! Dear Janine Roach: 
Thank you for requesting a LeBUZZ account. Our records indicate that you already have an active LeBUZZ account. You can access your account anytime at [email or web address] Did you know that you can access your hospital and ER discharge instructions at any time in LeBUZZ? You can also review all of your test results from your hospital stay or ER visit. Additional Information If you have questions, please visit the Frequently Asked Questions section of the LeBUZZ website at [email or web address] Remember, LeBUZZ is NOT to be used for urgent needs. For medical emergencies, dial 911. Now available from your iPhone and Android! Please provide this summary of care documentation to your next provider. Your primary care clinician is listed as Conchita Barnett. If you have any questions after today's visit, please call [phone].

## 2018-05-07 RX ORDER — DILTIAZEM HYDROCHLORIDE 240 MG/1
240 CAPSULE, EXTENDED RELEASE ORAL DAILY
Qty: 90 CAP | Refills: 3 | Status: SHIPPED | OUTPATIENT
Start: 2018-05-07 | End: 2018-10-30 | Stop reason: SDUPTHER

## 2018-05-08 ENCOUNTER — TELEPHONE (OUTPATIENT)
Dept: FAMILY MEDICINE CLINIC | Age: 83
End: 2018-05-08

## 2018-05-08 ENCOUNTER — OFFICE VISIT (OUTPATIENT)
Dept: FAMILY MEDICINE CLINIC | Age: 83
End: 2018-05-08

## 2018-05-08 VITALS
RESPIRATION RATE: 14 BRPM | OXYGEN SATURATION: 98 % | HEIGHT: 66 IN | WEIGHT: 140.6 LBS | TEMPERATURE: 98.2 F | HEART RATE: 93 BPM | BODY MASS INDEX: 22.6 KG/M2 | DIASTOLIC BLOOD PRESSURE: 90 MMHG | SYSTOLIC BLOOD PRESSURE: 140 MMHG

## 2018-05-08 DIAGNOSIS — I10 ESSENTIAL HYPERTENSION WITH GOAL BLOOD PRESSURE LESS THAN 140/90: ICD-10-CM

## 2018-05-08 LAB
BACTERIA SPEC AEROBE CULT: NORMAL
BACTERIA SPEC ANAEROBE CULT: NORMAL
GRAM STN SPEC: NORMAL
GRAM STN SPEC: NORMAL

## 2018-05-08 RX ORDER — MUPIROCIN 20 MG/G
OINTMENT TOPICAL
Qty: 22 G | Refills: 2 | Status: SHIPPED | OUTPATIENT
Start: 2018-05-08 | End: 2019-07-19

## 2018-05-08 NOTE — PROGRESS NOTES
Gordon Thakur is a 80 y.o. female presenting for/with:    Wound Check (follow up)    HPI:  Symptoms include redness and d/c from surgical site R thigh. Had a [address] removed 2 weeks ago with Dr. Nora Yang. Wound became a little more red, swollen, with some streaks and yellow d/c, and pt felt some chills over past week. Started hot compresses and ceftin last week, keith fairly  Well, redness resolving and discharge resolving, but has a little stomach upset with that. Culture showed no specific pathogen and no MRSA/MSSA    HTN  Saw Endocrine. Boosted diltiazem to [id number] daily, keith well. Has new ore for 240's to . keith well. Moved zebeta to QHS to decrease orthostasis sx, keith better that way. PMH, SH, Medications/Allergies: reviewed, on chart. ROS:  Constitutional: No fever, chills or weight loss  Respiratory: No cough, SOB   CV: No chest pain or Palpitations    Visit Vitals    /90 (BP 1 Location: Right arm, BP Patient Position: Sitting)    Pulse 93    Temp 98.2 °F (36.8 °C) (Temporal)    Resp 14    Ht 5' 6\" (1.676 m)    Wt 140 lb 9.6 oz (63.8 kg)    SpO2 98%    BMI 22.69 kg/m2     Wt Readings from Last 3 Encounters:   05/08/18 140 lb 9.6 oz (63.8 kg)   05/03/18 141 lb 3.2 oz (64 kg)   04/20/18 138 lb 3.2 oz (62.7 kg)     BP Readings from Last 3 Encounters:   05/08/18 140/90   05/03/18 130/80   04/20/18 164/82     Physical Examination: General appearance - alert, well appearing, and in no distress  Mental status - alert, oriented to person, place, and time  Eyes - pupils equal and reactive, extraocular eye movements intact  ENT - bilateral external ears and nose normal. Normal lips  Neck - supple, no significant adenopathy, no thyromegaly or mass  Extremities - peripheral pulses normal, no pedal edema, no clubbing or cyanosis. R thigh with 3mm circular wound with dry base, no streaking present. A/p:  BCC s/p excision. Wound improved today. Con't ceftin, wound care.  Culture was Neg for MRSA or other specific pathogen. Use bactroban topically BID for next week or so    HTN  Fair today s/p boost diltiazem to 240mg daily (2x120's). Ok to  the 240's when ready, already ordered by endocrine. F/U 2wk for recheck.

## 2018-05-08 NOTE — PROGRESS NOTES
1. Have you been to the ER, urgent care clinic since your last visit? Hospitalized since your last visit? No    2. Have you seen or consulted any other health care providers outside of the [facility] since your last visit? Include any pap smears or colon screening.  Yes When: Dr. Suzanne Pagan this month [phone]

## 2018-05-08 NOTE — MR AVS SNAPSHOT
[address] [facility] 
 
 
 [address] [place] [address],[address] [address] [phone] Patient: Ovidio Arenas MRN: [id number] SEW:[birth year] Visit Information Date & Time Provider Department Dept. Phone Encounter #  
 5/8/2018  2:00 PM Yves AmbroseJared [id number] Follow-up Instructions Return in about 2 weeks (around 5/22/2018). Follow-up and Disposition History Your Appointments 5/22/2018  1:40 PM  
ESTABLISHED PATIENT with MD [address] ([facility]) Appt Note: 2 wk f/u  
 [address] [place] [address],[address] [address] [phone]  
  
   
 [address] [place] [address],[facility] Floor [address] 9/10/2018  1:00 PM  
ESTABLISHED PATIENT with MD Kera [address] ([facility]) Appt Note: 6 mo f/u  
 [address] [place] [address],[address] [address] [phone] 9/24/2018  1:50 PM  
Follow Up with Johny Hess MD  
[place] [facility]) Appt Note: f/u                       3 month thyroid One [address] [id number] [address] [facility] Upcoming Health Maintenance Date Due  
 GLAUCOMA SCREENING Q2Y 2/3/2018 Influenza Age 5 to Adult 8/1/2018 MEDICARE YEARLY EXAM 3/10/2019 DTaP/Tdap/Td series (2 - Td) 3/9/2028 Allergies as of 5/8/2018  Review Complete On: 5/8/2018 By: Yves Ambrose MD  
  
 Severity Noted Reaction Type Reactions Contrast Agent [Iodine]  03/02/2015    Unknown (comments) Doxycycline  03/02/2015    Unknown (comments) Erythromycin  03/02/2015    Unknown (comments) Macrodantin [Nitrofurantoin Macrocrystalline]  03/02/2015    Unknown (comments) Sulfa (Sulfonamide Antibiotics)  03/02/2015    Hives Eyes turned red Tamoxifen  04/07/2017    Other (comments) Current Immunizations  Reviewed on 10/26/2017 Name Date Influenza High Dose Vaccine PF 9/8/2017, 9/2/2016, 10/1/2015 Influenza Vaccine 10/17/2014 Pneumococcal Conjugate (PCV-13) 9/2/2016 Pneumococcal Vaccine (Unspecified Type) 10/1/2009 Td 6/1/2008 Zoster Vaccine, Live 6/1/2007 Not reviewed this visit You Were Diagnosed With   
  
 Codes Comments Postoperative wound infection, initial encounter    -  Primary ICD-10-CM: T81. 4XXA ICD-9-CM: 998.59 Essential hypertension with goal blood pressure less than 140/90     ICD-10-CM: I10 
ICD-9-CM: 401.9 Vitals BP Pulse Temp Resp Height(growth percentile) 140/90 (BP 1 Location: Right arm, BP Patient Position: Sitting) 93 98.2 °F (36.8 °C) (Temporal) 14 5' 6\" (1.676 m) Weight(growth percentile) SpO2 BMI OB Status Smoking Status 140 lb 9.6 oz (63.8 kg) 98% 22.69 kg/m2 Hysterectomy Never Smoker BMI and BSA Data Body Mass Index Body Surface Area  
 22.69 kg/m 2 1.72 m 2 Preferred Pharmacy Pharmacy Name Phone [address], [address] AT [facility] OF  3 & [facility]. Jackycecy Gunn [phone] Your Updated Medication List  
  
   
This list is accurate as of 5/8/18  2:30 PM.  Always use your most recent med list.  
  
  
  
  
 aspirin delayed-release 81 mg tablet Take  by mouth daily. bisoprolol 10 mg tablet Commonly known as:  Margaretha Aver Take 1 Tab by mouth daily. Indications: thyroid, pressure, heart  
  
 cefUROXime 500 mg tablet Commonly known as:  CEFTIN Take 1 Tab by mouth two (2) times a day for 7 days. CLARITIN LIQUI-GEL 10 mg Cap Generic drug:  loratadine Take 1 Tab by mouth daily as needed. dilTIAZem  mg XR capsule Commonly known as:  DILACOR XR Take 1 Cap by mouth daily. Indications: pressure and heart  
  
 glucosamine-chondroitin 750-600 mg Tab Take  by mouth two (2) times a day. methIMAzole 5 mg tablet Commonly known as:  TAPAZOLE Take one tablet on Monday, Wednesday, Friday and Saturday for thyroid  
  
 mupirocin 2 % ointment Commonly known as:  BACTROBAN  
AAA in thin coat BID for sore  
  
 naproxen 500 mg tablet Commonly known as:  NAPROSYN Take 1 Tab by mouth two (2) times daily as needed. Indications: pain  
  
 omeprazole 40 mg capsule Commonly known as:  PRILOSEC  
TAKE 1 CAPSULE BY MOUTH DAILY  
  
 raloxifene 60 mg tablet Commonly known as:  EVISTA TAKE 1 TABLET BY MOUTH DAILY  
  
 STOOL SOFTENER 100 mg capsule Generic drug:  docusate sodium Take 100 mg by mouth three (3) times daily as needed. VITAMIN B-12 1,000 mcg tablet Generic drug:  cyanocobalamin Take 1,000 mcg by mouth daily. Prescriptions Sent to Pharmacy Refills  
 mupirocin (BACTROBAN) 2 % ointment 2 Sig: AAA in thin coat BID for sore Class: Normal  
 [facility] [address] Ph #: [phone] Follow-up Instructions Return in about 2 weeks (around 5/22/2018). Patient Instructions If you have any questions regarding [employer], you may call [employer] support at [phone]. [facility]! Dear Noel London: 
Thank you for requesting a MedPlasts account. Our records indicate that you already have an active MedPlasts account. You can access your account anytime at [email or web address] Did you know that you can access your hospital and ER discharge instructions at any time in MedPlasts? You can also review all of your test results from your hospital stay or ER visit. Additional Information If you have questions, please visit the Frequently Asked Questions section of the MedPlasts website at [email or web address] Remember, MyChart is NOT to be used for urgent needs. For medical emergencies, dial 911. Now available from your iPhone and Android! Please provide this summary of care documentation to your next provider. Your primary care clinician is listed as Sana Barnett. If you have any questions after today's visit, please call [phone].

## 2018-05-30 ENCOUNTER — OFFICE VISIT (OUTPATIENT)
Dept: FAMILY MEDICINE CLINIC | Age: 83
End: 2018-05-30

## 2018-05-30 VITALS
TEMPERATURE: 98.4 F | DIASTOLIC BLOOD PRESSURE: 82 MMHG | SYSTOLIC BLOOD PRESSURE: 120 MMHG | WEIGHT: 141 LBS | HEART RATE: 79 BPM | OXYGEN SATURATION: 98 % | BODY MASS INDEX: 22.66 KG/M2 | RESPIRATION RATE: 14 BRPM | HEIGHT: 66 IN

## 2018-05-30 DIAGNOSIS — E21.3 HYPERPARATHYROIDISM (HCC): Primary | ICD-10-CM

## 2018-05-30 NOTE — PROGRESS NOTES
1. Have you been to the ER, urgent care clinic since your last visit? Hospitalized since your last visit? No    2. Have you seen or consulted any other health care providers outside of the [address] [place] [address] since your last visit? Include any pap smears or colon screening.  Yes Camryn Dave Tc

## 2018-05-30 NOTE — PROGRESS NOTES
Colleen Valderrama is a 80 y.o. female presenting for/with:    Wound Check (top of right leg thigh area)    HPI:  F/U R thigh infected biopsy site. Redness and d/c from surgical site R thigh have resolved, skin healed over well. Seeing Dr. Suzanne Pagan. No further chills. HTN  Doing well with boosted diltiazem to 240mg daily and zebeta 10mg QHS. No orthostasis sx lately. PMH, SH, Medications/Allergies: reviewed, on chart. ROS:  Constitutional: No fever, chills or weight loss  Respiratory: No cough, SOB   CV: No chest pain or Palpitations    Visit Vitals    /82 (BP 1 Location: Right arm, BP Patient Position: Sitting)    Pulse 79    Temp 98.4 °F (36.9 °C) (Oral)    Resp 14    Ht 5' 6\" (1.676 m)    Wt 141 lb (64 kg)    SpO2 98%    BMI 22.76 kg/m2     Wt Readings from Last 3 Encounters:   05/30/18 141 lb (64 kg)   05/08/18 140 lb 9.6 oz (63.8 kg)   05/03/18 141 lb 3.2 oz (64 kg)     BP Readings from Last 3 Encounters:   05/30/18 120/82   05/08/18 140/90   05/03/18 130/80     Physical Examination: General appearance - alert, well appearing, and in no distress  Mental status - alert, oriented to person, place, and time  Eyes - pupils equal and reactive, extraocular eye movements intact  ENT - bilateral external ears and nose normal. Normal lips  Neck - supple, no significant adenopathy, no thyromegaly or mass  Extremities - peripheral pulses normal, no pedal edema, no clubbing or cyanosis. R thigh with healed wound. Neuro- mod distal tremor today. A/p:  BCC s/p excision. Wound healed. Monitor. HTN  Good today. con't current tx. Estrella Krueger in [place]. Had eval. MRI yesterday at [facility]. Awaiting report (not in yet). Ca++ and PTH still pretty high. F/U 2mo for recheck.

## 2018-05-30 NOTE — PATIENT INSTRUCTIONS
If you have any questions regarding [employer], you may call [employer] support at [phone].

## 2018-05-30 NOTE — MR AVS SNAPSHOT
[facility] 
 
 
 [address] [address] [phone] Patient: Odetta Homans MRN: [id number] EGW:0/9/1896 Visit Information Date & Time Provider Department Dept. Phone Encounter #  
 5/30/2018 11:30 AM Charan Sigala Jared Schultz [id number] Follow-up Instructions Return in about 2 months (around 7/30/2018). Your Appointments 9/10/2018  1:00 PM  
ESTABLISHED PATIENT with MD Artur [address] ([address]) Appt Note: 6 mo f/u  
 [address] [address] [phone]  
  
   
 [address] [place] [address],[address] [address] 9/24/2018  1:50 PM  
Follow Up with MD Vamsi Arv[facility] [address] [place] [address]) Appt Note: f/u                       3 month thyroid [facility] [phone] [address] [place] [address] Upcoming Health Maintenance Date Due  
 GLAUCOMA SCREENING Q2Y 2/3/2018 Influenza Age 5 to Adult 8/1/2018 MEDICARE YEARLY EXAM 3/10/2019 DTaP/Tdap/Td series (2 - Td) 3/9/2028 Allergies as of 5/30/2018  Review Complete On: 5/30/2018 By: Charan Sigala MD  
  
 Severity Noted Reaction Type Reactions Latex  05/22/2018    Rash Contrast Agent [Iodine]  03/02/2015    Unknown (comments), Hives Doxycycline  03/02/2015    Unknown (comments), Hives Erythromycin  03/02/2015    Unknown (comments), Hives Macrodantin [Nitrofurantoin Macrocrystalline]  03/02/2015    Unknown (comments) Nitrofurantoin  03/02/2015    Hives Sulfa (Sulfonamide Antibiotics)  03/02/2015    Hives Eyes turned red Sulfasalazine  03/02/2015    Hives Eyes turned red Tamoxifen  04/07/2017    Other (comments), Rash Current Immunizations  Reviewed on 10/26/2017 Name Date Influenza High Dose Vaccine PF 9/8/2017, 9/2/2016, 10/1/2015 Influenza Vaccine 10/17/2014 Pneumococcal Conjugate (PCV-13) 9/2/2016 Pneumococcal Vaccine (Unspecified Type) 10/1/2009 Td 6/1/2008 Zoster Vaccine, Live 6/1/2007 Not reviewed this visit You Were Diagnosed With   
  
 Codes Comments Hyperparathyroidism ([facility]    -  Primary ICD-10-CM: E21.3 ICD-9-CM: 252.00 Vitals BP Pulse Temp Resp Height(growth percentile) Weight(growth percentile) 120/82 (BP 1 Location: Right arm, BP Patient Position: Sitting) 79 98.4 °F (36.9 °C) (Oral) 14 5' 6\" (1.676 m) 141 lb (64 kg) SpO2 BMI OB Status Smoking Status 98% 22.76 kg/m2 Hysterectomy Never Smoker BMI and BSA Data Body Mass Index Body Surface Area  
 22.76 kg/m 2 1.73 m 2 Preferred Pharmacy Pharmacy Name Phone [address], [address] AT [facility] OF  3 & ZIG[facility] SAINZ [facility] [phone] Your Updated Medication List  
  
   
This list is accurate as of 5/30/18  1:17 PM.  Always use your most recent med list.  
  
  
  
  
 aspirin delayed-release 81 mg tablet Take  by mouth daily. bisoprolol 10 mg tablet Commonly known as:  Beverli Aicha Take 1 Tab by mouth daily. Indications: thyroid, pressure, heart CLARITIN LIQUI-GEL 10 mg Cap Generic drug:  loratadine Take 1 Tab by mouth daily as needed. dilTIAZem  mg XR capsule Commonly known as:  DILACOR XR Take 1 Cap by mouth daily. Indications: pressure and heart  
  
 glucosamine-chondroitin 750-600 mg Tab Take  by mouth two (2) times a day. methIMAzole 5 mg tablet Commonly known as:  TAPAZOLE Take one tablet on Monday, Wednesday, Friday and Saturday for thyroid  
  
 mupirocin 2 % ointment Commonly known as:  BACTROBAN  
AAA in thin coat BID for sore  
  
 naproxen 500 mg tablet Commonly known as:  NAPROSYN  
 Take 1 Tab by mouth two (2) times daily as needed. Indications: pain  
  
 omeprazole 40 mg capsule Commonly known as:  PRILOSEC  
TAKE 1 CAPSULE BY MOUTH DAILY  
  
 raloxifene 60 mg tablet Commonly known as:  EVISTA TAKE 1 TABLET BY MOUTH DAILY  
  
 STOOL SOFTENER 100 mg capsule Generic drug:  docusate sodium Take 100 mg by mouth three (3) times daily as needed. VITAMIN B-12 1,000 mcg tablet Generic drug:  cyanocobalamin Take 1,000 mcg by mouth daily. Follow-up Instructions Return in about 2 months (around 7/30/2018). Patient Instructions If you have any questions regarding [employer], you may call [employer] support at [phone]. Introducing [facility] SERVICES! Dear Dearjamel Kendrick: 
Thank you for requesting a [employer] account. Our records indicate that you already have an active [employer] account. You can access your account anytime at [email or web address] Did you know that you can access your hospital and ER discharge instructions at any time in [employer]? You can also review all of your test results from your hospital stay or ER visit. Additional Information If you have questions, please visit the Frequently Asked Questions section of the [employer] website at [email or web address] Remember, [employer] is NOT to be used for urgent needs. For medical emergencies, dial 911. Now available from your iPhone and Android! Please provide this summary of care documentation to your next provider. Your primary care clinician is listed as Jose Eduardo Barnett. If you have any questions after today's visit, please call [phone].

## 2018-06-07 ENCOUNTER — TELEPHONE (OUTPATIENT)
Dept: ENDOCRINOLOGY | Age: 83
End: 2018-06-07

## 2018-06-08 NOTE — TELEPHONE ENCOUNTER
**LATE ENTRY--PHONE CALL TOOK PLACE ON 6/6/18 AT 5PM**    Dr. 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 As part of the evaluation she yoselin a thyroglobulin that was elevated at 214 but her TG antibody level was undetectable and was wondering the significance of this and whether patient could have Hashimotos' encephalopathy as a cause. I explained that patient has a history of hyperthyroidism from nodular thyroid disease and does not have autoimmune thyroid disease as her test for Grave's disease has been undetectable in the past.  If she had Hashimoto's than usually she would have an elevated thyroglobulin antibody level. I explained that an elevated thyroglobulin level in the setting of an intact thyroid gland is unhelpful as normal thyroid tissue makes thyroglobulin and therefore this test is not concerning. She said that she is in the process of evaluating her for other paraneoplastic processes and has other labs pending and just performed a lumbar puncture at her recent office visit to check for CJD. She states her recent calcium was 10.7 and PTH was 139 and I explained she has an underlying diagnosis of hyperparathyroidism and these labs fit with this but I don't plan to pursue surgery as her level has been stable under 11. She states she has given her 1/2 tab of 0.5 mg clonazepam for myoclonus and this had helped to some degree. I told her to contact me if she needs anything else with this patient in the future.

## 2018-06-08 NOTE — TELEPHONE ENCOUNTER
----- Message from Caprice Velasquez sent at 6/5/2018 11:47 AM EDT -----  Regarding: Physician Call  Dr. Jace Espinal, Neurologist in [place], would like you to call her on her cell regarding a \"High Thyroglobulin\" on this patient. Dr. Zee Tay can be reached at:  [phone].

## 2018-06-25 ENCOUNTER — TELEPHONE (OUTPATIENT)
Dept: FAMILY MEDICINE CLINIC | Age: 83
End: 2018-06-25

## 2018-06-25 NOTE — TELEPHONE ENCOUNTER
Went to see the neurologist, Dr. Brad Jasso. She wants her to start taking Diyalproex ER 250mg. Everything she reads is very negative about this medication. She wants to get Dr. Ailyn Meng opinion regarding it and what she is already taking.

## 2018-06-25 NOTE — TELEPHONE ENCOUNTER
Went to see the neurologist, Dr. Mildred Vera. She wants her to start taking Diyalproex ER 250mg. Everything she reads is very negative about this medication. She wants to get Dr. Patricia Estrada opinion regarding it and what she is already taking.

## 2018-06-25 NOTE — TELEPHONE ENCOUNTER
There are no major contraindications with adding divalproex sodium (depakote) to pt's current regimen. There is a minor interaction with naproxen for increased bruising/bleeding risk. I would cut back on the naproxen for now, but I would take the divalproex if her neurologist recommended it. Seizures can be a life threatening problem. Attempted to call pt, no answer.

## 2018-07-23 ENCOUNTER — DOCUMENTATION ONLY (OUTPATIENT)
Dept: FAMILY MEDICINE CLINIC | Age: 83
End: 2018-07-23

## 2018-07-23 ENCOUNTER — TELEPHONE (OUTPATIENT)
Dept: FAMILY MEDICINE CLINIC | Age: 83
End: 2018-07-23

## 2018-07-23 PROBLEM — I63.9 STROKE (CEREBRUM) (HCC): Status: ACTIVE | Noted: 2018-07-23

## 2018-07-23 NOTE — TELEPHONE ENCOUNTER
[phone] contact # jenny Abreu(daughter) please ask Nurse to give me a call back ASAP as this is extremeely important as concerned that my mother has had a stroke and need help. The young man that Dr. Sherryle Bollard is speaking with on the telephone is Kota grandson who is not all together and should not be speaking to him.   Please contact me Matteo MAYER    Thanks,

## 2018-07-23 NOTE — PROGRESS NOTES
Pt had sudden onset of slurred speech this AM. Not in pain, no HA. Pt didn't want to call the ambulance. Hasn't discussed sx with neurology yet. I spoke to pt, she is having a dense aphasia c/w Wernickie's aphasia. I recommended she go by EMS to the nearest hospital. Pt said she would consider that, but wanted to talk to her daughter first. I noted also to her caregiver/Son that there is urgency in this evaluation and again recommended an ER eval, he said he would consider it after discussing with his mom.

## 2018-07-23 NOTE — PROGRESS NOTES
Pt called appears to be in the act of having a stroke. Spoke with the caregiver he stated we were taking our sweet ass time in diagnosis ing her for her slurred speech. Pt stated something has changed caregiver took the phone away. Advised the pt numerous times to go to the Er.

## 2018-07-25 ENCOUNTER — TELEPHONE (OUTPATIENT)
Dept: FAMILY MEDICINE CLINIC | Age: 83
End: 2018-07-25

## 2018-07-25 NOTE — TELEPHONE ENCOUNTER
Bridget Anthony who states he spoke w/Dr. Tiara Floyd on Monday, 07/23/2018 and patient was admitted to [facility] on Monday, 07/23/2018 as well. Lino Rolon is calling to inform Dr. Tiara Floyd of this and to ask him to come by [facility] to visit with patient as she's in room # 116.   Please send this message to the doctor for us

## 2018-07-25 NOTE — TELEPHONE ENCOUNTER
Nga Kenny who states he spoke w/Dr. Aislinn Clark on Monday, 07/23/2018 and patient was admitted to [facility] on Monday, 07/23/2018 as well. Watson Pascual is calling to inform Dr. Aislinn Clark of this and to ask him to come by [facility] to visit with patient as she's in room # 116. Please send this message to the doctor for us.     Thanks,

## 2018-07-27 ENCOUNTER — HOSPITAL ENCOUNTER (OUTPATIENT)
Dept: REHABILITATION | Age: 83
End: 2018-08-22
Attending: PHYSICAL MEDICINE & REHABILITATION | Admitting: PHYSICAL MEDICINE & REHABILITATION

## 2018-07-27 DIAGNOSIS — I63.9 CEREBRAL INFARCTION (HCC): ICD-10-CM

## 2018-07-27 DIAGNOSIS — M25.571 PAIN IN RIGHT ANKLE: ICD-10-CM

## 2018-07-28 ENCOUNTER — APPOINTMENT (OUTPATIENT)
Dept: GENERAL RADIOLOGY | Age: 83
End: 2018-07-28
Attending: PHYSICAL MEDICINE & REHABILITATION

## 2018-07-28 PROCEDURE — 73600 X-RAY EXAM OF ANKLE: CPT

## 2018-07-29 LAB
ANION GAP SERPL CALC-SCNC: 6 MMOL/L (ref 5–15)
BUN SERPL-MCNC: 32 MG/DL (ref 6–20)
BUN/CREAT SERPL: 27 (ref 12–20)
CALCIUM SERPL-MCNC: 10.6 MG/DL (ref 8.5–10.1)
CHLORIDE SERPL-SCNC: 108 MMOL/L (ref 97–108)
CO2 SERPL-SCNC: 26 MMOL/L (ref 21–32)
CREAT SERPL-MCNC: 1.2 MG/DL (ref 0.55–1.02)
ERYTHROCYTE [DISTWIDTH] IN BLOOD BY AUTOMATED COUNT: 13.7 % (ref 11.5–14.5)
GLUCOSE SERPL-MCNC: 100 MG/DL (ref 65–100)
HCT VFR BLD AUTO: 38.5 % (ref 35–47)
HGB BLD-MCNC: 12.7 G/DL (ref 11.5–16)
MAGNESIUM SERPL-MCNC: 2.4 MG/DL (ref 1.6–2.4)
MCH RBC QN AUTO: 28.9 PG (ref 26–34)
MCHC RBC AUTO-ENTMCNC: 33 G/DL (ref 30–36.5)
MCV RBC AUTO: 87.5 FL (ref 80–99)
NRBC # BLD: 0 K/UL (ref 0–0.01)
NRBC BLD-RTO: 0 PER 100 WBC
PLATELET # BLD AUTO: 240 K/UL (ref 150–400)
PMV BLD AUTO: 11.2 FL (ref 8.9–12.9)
POTASSIUM SERPL-SCNC: 3.9 MMOL/L (ref 3.5–5.1)
RBC # BLD AUTO: 4.4 M/UL (ref 3.8–5.2)
SODIUM SERPL-SCNC: 140 MMOL/L (ref 136–145)
WBC # BLD AUTO: 9.2 K/UL (ref 3.6–11)

## 2018-07-29 PROCEDURE — 80048 BASIC METABOLIC PNL TOTAL CA: CPT | Performed by: PHYSICAL MEDICINE & REHABILITATION

## 2018-07-29 PROCEDURE — 85027 COMPLETE CBC AUTOMATED: CPT | Performed by: PHYSICAL MEDICINE & REHABILITATION

## 2018-07-29 PROCEDURE — 83735 ASSAY OF MAGNESIUM: CPT | Performed by: PHYSICAL MEDICINE & REHABILITATION

## 2018-07-29 PROCEDURE — 36415 COLL VENOUS BLD VENIPUNCTURE: CPT | Performed by: PHYSICAL MEDICINE & REHABILITATION

## 2018-08-01 LAB
ANION GAP SERPL CALC-SCNC: 5 MMOL/L (ref 5–15)
BUN SERPL-MCNC: 31 MG/DL (ref 6–20)
BUN/CREAT SERPL: 27 (ref 12–20)
CALCIUM SERPL-MCNC: 10.6 MG/DL (ref 8.5–10.1)
CHLORIDE SERPL-SCNC: 109 MMOL/L (ref 97–108)
CO2 SERPL-SCNC: 26 MMOL/L (ref 21–32)
CREAT SERPL-MCNC: 1.13 MG/DL (ref 0.55–1.02)
GLUCOSE SERPL-MCNC: 88 MG/DL (ref 65–100)
POTASSIUM SERPL-SCNC: 4.5 MMOL/L (ref 3.5–5.1)
SODIUM SERPL-SCNC: 140 MMOL/L (ref 136–145)

## 2018-08-01 PROCEDURE — 36415 COLL VENOUS BLD VENIPUNCTURE: CPT | Performed by: PHYSICAL MEDICINE & REHABILITATION

## 2018-08-01 PROCEDURE — 80048 BASIC METABOLIC PNL TOTAL CA: CPT | Performed by: PHYSICAL MEDICINE & REHABILITATION

## 2018-08-05 ENCOUNTER — APPOINTMENT (OUTPATIENT)
Dept: CT IMAGING | Age: 83
End: 2018-08-05
Attending: PHYSICAL MEDICINE & REHABILITATION

## 2018-08-05 LAB
APPEARANCE UR: CLEAR
BACTERIA URNS QL MICRO: NEGATIVE /HPF
BILIRUB UR QL: NEGATIVE
COLOR UR: ABNORMAL
EPITH CASTS URNS QL MICRO: ABNORMAL /LPF
GLUCOSE UR STRIP.AUTO-MCNC: NEGATIVE MG/DL
HGB UR QL STRIP: NEGATIVE
HYALINE CASTS URNS QL MICRO: ABNORMAL /LPF (ref 0–5)
KETONES UR QL STRIP.AUTO: NEGATIVE MG/DL
LEUKOCYTE ESTERASE UR QL STRIP.AUTO: ABNORMAL
NITRITE UR QL STRIP.AUTO: NEGATIVE
PH UR STRIP: 6 [PH] (ref 5–8)
PROT UR STRIP-MCNC: 30 MG/DL
RBC #/AREA URNS HPF: ABNORMAL /HPF (ref 0–5)
SP GR UR REFRACTOMETRY: 1.02 (ref 1–1.03)
UROBILINOGEN UR QL STRIP.AUTO: 0.2 EU/DL (ref 0.2–1)
WBC URNS QL MICRO: ABNORMAL /HPF (ref 0–4)

## 2018-08-05 PROCEDURE — 70450 CT HEAD/BRAIN W/O DYE: CPT

## 2018-08-05 PROCEDURE — 81001 URINALYSIS AUTO W/SCOPE: CPT | Performed by: PHYSICAL MEDICINE & REHABILITATION

## 2018-08-05 PROCEDURE — 87086 URINE CULTURE/COLONY COUNT: CPT | Performed by: PHYSICAL MEDICINE & REHABILITATION

## 2018-08-06 LAB
ALBUMIN SERPL-MCNC: 3 G/DL (ref 3.5–5)
ALBUMIN/GLOB SERPL: 0.9 {RATIO} (ref 1.1–2.2)
ALP SERPL-CCNC: 63 U/L (ref 45–117)
ALT SERPL-CCNC: 33 U/L (ref 12–78)
ANION GAP SERPL CALC-SCNC: 9 MMOL/L (ref 5–15)
AST SERPL-CCNC: 21 U/L (ref 15–37)
BACTERIA SPEC CULT: NORMAL
BILIRUB SERPL-MCNC: 1.2 MG/DL (ref 0.2–1)
BUN SERPL-MCNC: 19 MG/DL (ref 6–20)
BUN/CREAT SERPL: 19 (ref 12–20)
CALCIUM SERPL-MCNC: 10.3 MG/DL (ref 8.5–10.1)
CALCIUM SERPL-MCNC: 10.5 MG/DL (ref 8.5–10.1)
CC UR VC: NORMAL
CHLORIDE SERPL-SCNC: 107 MMOL/L (ref 97–108)
CO2 SERPL-SCNC: 25 MMOL/L (ref 21–32)
CREAT SERPL-MCNC: 1.01 MG/DL (ref 0.55–1.02)
GLOBULIN SER CALC-MCNC: 3.4 G/DL (ref 2–4)
GLUCOSE SERPL-MCNC: 77 MG/DL (ref 65–100)
POTASSIUM SERPL-SCNC: 4 MMOL/L (ref 3.5–5.1)
PROT SERPL-MCNC: 6.4 G/DL (ref 6.4–8.2)
PTH-INTACT SERPL-MCNC: 69.2 PG/ML (ref 18.4–88)
SERVICE CMNT-IMP: NORMAL
SODIUM SERPL-SCNC: 141 MMOL/L (ref 136–145)

## 2018-08-06 PROCEDURE — 80053 COMPREHEN METABOLIC PANEL: CPT | Performed by: PHYSICAL MEDICINE & REHABILITATION

## 2018-08-06 PROCEDURE — 36415 COLL VENOUS BLD VENIPUNCTURE: CPT | Performed by: PHYSICAL MEDICINE & REHABILITATION

## 2018-08-06 PROCEDURE — 83970 ASSAY OF PARATHORMONE: CPT | Performed by: PHYSICAL MEDICINE & REHABILITATION

## 2018-08-09 ENCOUNTER — APPOINTMENT (OUTPATIENT)
Dept: GENERAL RADIOLOGY | Age: 83
End: 2018-08-09
Attending: PHYSICAL MEDICINE & REHABILITATION

## 2018-08-09 PROCEDURE — 73620 X-RAY EXAM OF FOOT: CPT

## 2018-08-14 LAB
ANION GAP SERPL CALC-SCNC: 6 MMOL/L (ref 5–15)
BUN SERPL-MCNC: 18 MG/DL (ref 6–20)
BUN/CREAT SERPL: 18 (ref 12–20)
CALCIUM SERPL-MCNC: 10 MG/DL (ref 8.5–10.1)
CHLORIDE SERPL-SCNC: 111 MMOL/L (ref 97–108)
CO2 SERPL-SCNC: 27 MMOL/L (ref 21–32)
CREAT SERPL-MCNC: 0.98 MG/DL (ref 0.55–1.02)
ERYTHROCYTE [DISTWIDTH] IN BLOOD BY AUTOMATED COUNT: 13.6 % (ref 11.5–14.5)
GLUCOSE SERPL-MCNC: 96 MG/DL (ref 65–100)
HCT VFR BLD AUTO: 36.3 % (ref 35–47)
HGB BLD-MCNC: 12 G/DL (ref 11.5–16)
MAGNESIUM SERPL-MCNC: 2 MG/DL (ref 1.6–2.4)
MCH RBC QN AUTO: 29.5 PG (ref 26–34)
MCHC RBC AUTO-ENTMCNC: 33.1 G/DL (ref 30–36.5)
MCV RBC AUTO: 89.2 FL (ref 80–99)
NRBC # BLD: 0 K/UL (ref 0–0.01)
NRBC BLD-RTO: 0 PER 100 WBC
PLATELET # BLD AUTO: 274 K/UL (ref 150–400)
PMV BLD AUTO: 10.7 FL (ref 8.9–12.9)
POTASSIUM SERPL-SCNC: 4.1 MMOL/L (ref 3.5–5.1)
RBC # BLD AUTO: 4.07 M/UL (ref 3.8–5.2)
SODIUM SERPL-SCNC: 144 MMOL/L (ref 136–145)
WBC # BLD AUTO: 8 K/UL (ref 3.6–11)

## 2018-08-14 PROCEDURE — 83735 ASSAY OF MAGNESIUM: CPT | Performed by: PHYSICAL MEDICINE & REHABILITATION

## 2018-08-14 PROCEDURE — 85027 COMPLETE CBC AUTOMATED: CPT | Performed by: PHYSICAL MEDICINE & REHABILITATION

## 2018-08-14 PROCEDURE — 80048 BASIC METABOLIC PNL TOTAL CA: CPT | Performed by: PHYSICAL MEDICINE & REHABILITATION

## 2018-08-14 PROCEDURE — 36415 COLL VENOUS BLD VENIPUNCTURE: CPT | Performed by: PHYSICAL MEDICINE & REHABILITATION

## 2019-01-11 ENCOUNTER — OFFICE VISIT (OUTPATIENT)
Dept: ENDOCRINOLOGY | Age: 84
End: 2019-01-11

## 2019-01-11 ENCOUNTER — TELEPHONE (OUTPATIENT)
Dept: ENDOCRINOLOGY | Age: 84
End: 2019-01-11

## 2019-01-11 VITALS
HEIGHT: 64 IN | SYSTOLIC BLOOD PRESSURE: 113 MMHG | BODY MASS INDEX: 21.36 KG/M2 | DIASTOLIC BLOOD PRESSURE: 59 MMHG | HEART RATE: 67 BPM | WEIGHT: 125.1 LBS

## 2019-01-11 DIAGNOSIS — E05.20 TOXIC MULTINODULAR GOITER: Primary | ICD-10-CM

## 2019-01-11 DIAGNOSIS — E21.3 HYPERPARATHYROIDISM (HCC): ICD-10-CM

## 2019-01-11 DIAGNOSIS — I10 ESSENTIAL HYPERTENSION: ICD-10-CM

## 2019-01-11 NOTE — PROGRESS NOTES
Chief Complaint   Patient presents with    Thyroid Problem     follow up     History of Present Illness: Leanne Brown is a 80 y.o. female here for follow up of thyroid. Weight down 13 lbs since last visit in 4/18. Since last visit she suffered a stroke in 7/18 and this affected her speech and went to rehab for a prolonged period of time. Now is back at home and her grandson helps organize her meds so she is unclear as to what she is taking. No chest pain or palpitations. No tremors. No hair loss. No heat or cold intolerance. Bowels can be constipated and takes miralax. No kidney stones since last visit. Her diltiazem was decreased by Dr. Darius Donovan on 12/28/18 from 240 to 120 mg due to low pulse and it's better today and BP isn't as low. No dizziness with standing. Current Outpatient Medications   Medication Sig    aspirin delayed-release 325 mg tablet Take 1 Tab by mouth daily.  dilTIAZem XR (DILACOR XR) 120 mg XR capsule Take 1 Cap by mouth daily.  bisoprolol (ZEBETA) 10 mg tablet Take 1 Tab by mouth daily.  lisinopril (PRINIVIL, ZESTRIL) 20 mg tablet Take 1 Tab by mouth daily.  atorvastatin (LIPITOR) 40 mg tablet Take 1 Tab by mouth daily.  gabapentin (NEURONTIN) 100 mg capsule Take 2 Caps by mouth nightly.  methIMAzole (TAPAZOLE) 5 mg tablet Take one tablet on Monday, Wednesday, Friday and Saturday for thyroid    pantoprazole (PROTONIX) 40 mg tablet Take 1 Tab by mouth daily.  polyethylene glycol (MIRALAX) 17 gram/dose powder Take 17 g by mouth every Monday, Wednesday, Friday.  mupirocin (BACTROBAN) 2 % ointment AAA in thin coat BID for sore    loratadine (CLARITIN LIQUI-GEL) 10 mg cap Take 1 Tab by mouth daily as needed.  naproxen (NAPROSYN) 500 mg tablet Take 1 Tab by mouth two (2) times daily as needed. Indications: pain    docusate sodium (STOOL SOFTENER) 100 mg capsule Take 100 mg by mouth three (3) times daily as needed.     GLUCOSAMINE HCL/CHONDR PRESCOTT A NA (GLUCOSAMINE-CHONDROITIN) 750-600 mg tab Take  by mouth two (2) times a day.  cyanocobalamin (VITAMIN B-12) 1,000 mcg tablet Take 1,000 mcg by mouth daily. No current facility-administered medications for this visit. Allergies   Allergen Reactions    Latex Rash    Contrast Agent [Iodine] Unknown (comments) and Hives    Doxycycline Unknown (comments) and Hives    Erythromycin Unknown (comments) and Hives    Macrodantin [Nitrofurantoin Macrocrystalline] Unknown (comments)    Nitrofurantoin Hives    Sulfa (Sulfonamide Antibiotics) Hives     Eyes turned red    Sulfasalazine Hives     Eyes turned red    Tamoxifen Other (comments) and Rash     Review of Systems:  - Cardiovascular: no chest pain  - Neurological: no tremors  - Integumentary: skin is normal    Physical Examination:  Blood pressure 113/59, pulse 67, height 5' 4\" (1.626 m), weight 125 lb 1.6 oz (56.7 kg).   - General: pleasant, no distress, good eye contact, walks slowly with a walker  - Neck: small nodular goiter  - Cardiovascular: regular, normal rate, nl s1 and s2, no m/r/g   - Respiratory: clear to auscultation bilaterally   - Integumentary: skin is normal, no edema  - Neurological: reflexes 2+ at biceps, no tremors  - Psychiatric: normal mood and affect    Data Reviewed:   Component      Latest Ref Rng & Units 10/25/2018 10/25/2018 10/25/2018          12:00 PM 12:00 PM 12:00 PM   Sodium      134 - 144 mmol/L 142     Potassium      3.5 - 5.2 mmol/L 4.4     Chloride      96 - 106 mmol/L 101     CO2      20 - 29 mmol/L 27     Anion gap      5 - 15 mmol/L      Glucose      65 - 99 mg/dL 111 (H)     BUN      8 - 27 mg/dL 14     Creatinine      0.57 - 1.00 mg/dL 0.96     BUN/Creatinine ratio      12 - 28 15     GFR est AA      >59 mL/min/1.73 62     GFR est non-AA      >59 mL/min/1.73 54 (L)     Calcium      8.7 - 10.3 mg/dL 11.0 (H)     PTH, Intact      18.4 - 88.0 pg/mL      TSH      0.450 - 4.500 uIU/mL  0.172 (L)    T4, Free      0.82 - 1.77 ng/dL   1.39     Component      Latest Ref Rng & Units 8/14/2018 8/6/2018 7/24/2018 7/24/2018           5:40 AM  3:30 AM  7:45 PM  7:45 PM   Sodium      134 - 144 mmol/L 144      Potassium      3.5 - 5.2 mmol/L 4.1      Chloride      96 - 106 mmol/L 111 (H)      CO2      20 - 29 mmol/L 27      Anion gap      5 - 15 mmol/L 6      Glucose      65 - 99 mg/dL 96      BUN      8 - 27 mg/dL 18      Creatinine      0.57 - 1.00 mg/dL 0.98      BUN/Creatinine ratio      12 - 28 18      GFR est AA      >59 mL/min/1.73 >60      GFR est non-AA      >59 mL/min/1.73 54 (L)      Calcium      8.7 - 10.3 mg/dL 10.0 10.3 (H)     PTH, Intact      18.4 - 88.0 pg/mL  69.2     TSH      0.450 - 4.500 uIU/mL    0.31 (L)   T4, Free      0.82 - 1.77 ng/dL   1.2        Assessment/Plan:     1. Toxic multinodular goiter:  In March 2015, she was found to have a low TSH of 0.16 and this was just watched over time and her TSH slowly drifted down over the next year and was 0.06 in 4/16. Had a thyroid ultrasound in 6/16 at [facility] that showed a multinodular goiter with largest nodule of 2.9 cm in the right mid lobe. Her TSH was down to 0.05 in 10/16 and Dr. Lazaro Kumar decided to give a trial of methimazole 5 mg once daily and with taking this she started feeling better and energy was improving and tremors were improving. Started to gain some of the weight back she had lost which she estimated to be over 15 lbs. However, she developed some watering and redness of her eyes and small areas of intermittent rash on the arms and legs in Jan 2017 and was advised to stop the methimazole and her symptoms went away. Her TSH was back to normal at 0.68 in 1/17. With coming off the methimazole she started feeling worse again and was referred to Dr. Ritu Kerr whom she saw on 4/20/17. He repeated labs that showed her TSH was down to 0.15 with a normal FT4 of 1.27 and T3 of 127.   He ordered a thyroid ultrasound that showed a right parathyroid atypical mass vs lymph node and was sent for biopsy of her right lobe nodule and this was done on 5/1/17 and came back benign and she was sent to me for further evaluation. It sounds like she has developed hyperthyroidism from her nodules and with the methimazole, she was able to normalize her TSH and symptoms were improving. It's unclear if her mild rash and watering and redness of her eyes was truly from the methimazole and she was willing to restart the methimazole at a lower dose of 5 mg 4x/week at her first visit with me in 5/17. TSH 0.88 in 7/17 but had more rash on her back so decreased to 3x/week and rash improved and TSH 0.57 in 10/17 so kept her dose the same. TSH down to 0.27 in 4/18 so increased to 4x/week. TSH up to 0.31 in 7/18 but down to 0.17 in 10/18. If her level is still low, will go back up further on her dose. I would not favor surgery given her age and other co-morbidities. - cont methimazole 5 mg 4x/week  - check TSH and free T4 today and prior to next visit at Dr. Mennie Epley office     2. Hyperparathyroidism [facility]): Has had high calcium levels in the 11-11.7 range over the past 2 years in our system with a PTH of 88 in 12/15 and has been 36-53 range on repeat testing. However, has been told at least 15 years ago that her calcium level was high. Did have one kidney stone years ago. Is on vitamin D 1000 units daily but no calcium supplements or mvi. Vitamin D was 57 in 7/15. Her 24 hour urine calcium was low at 49 in 7/15. It's possible the hyperthyroidism may be exacerbating the hypercalcemia so hopefully with addressing this as above, this will help with the calcium. I had her stop the vitamin D to avoid hyperabsorption of dietary calcium in 5/17 and calcium was 11.6 in 7/17 but down to 10.9 in 10/17 and stable in 4/18. Down to 10.3 in 8/18 but up to 11 in 10/18.  Treatment of this condition is surgical and would not want to pursue this unless her calcium kecia over 12.  - drink plenty of water   - check bmp today and prior to next visit      3. Hypertension: BP <140/90 and not hypotensive and pulse is better on lower dose of diltiazem. - cont diltiazem 120 mg in the morning  - cont zebeta 10 mg at night  - cont lisinopril 20 mg daily      Patient Instructions   1) I will repeat your thyroid and calcium levels and be in touch next week with the results. 2) Keep taking methimazole for your thyroid 1 tab 4x/week until you hear back from me. 3) Your blood pressure and pulse look great today on the lower dose of diltiazem. Follow-up Disposition:  Return in about 6 months (around 7/11/2019). Copy sent to:  Nilsa Moran MD    Lab follow up: 1/12/19    Sent her the following message in a letter:    Resulted Orders   TSH 3RD GENERATION   Result Value Ref Range    TSH 1.070 0.450 - 4.500 uIU/mL    Narrative    Performed at:  [address]  [id number]  : Nona Keller MD, Phone:  [phone]   T4, FREE   Result Value Ref Range    T4, Free 1.24 0.82 - 1.77 ng/dL    Narrative    Performed at:  [address]  [id number]  : Nona Keller MD, Phone:  [phone]   METABOLIC PANEL, BASIC   Result Value Ref Range    Glucose 112 (H) 65 - 99 mg/dL      Comment:      Specimen received in contact with cells. No visible hemolysis  present. However GLUC may be decreased and K increased. Clinical  correlation indicated. BUN 19 8 - 27 mg/dL    Creatinine 1.28 (H) 0.57 - 1.00 mg/dL    GFR est non-AA 38 (L) >59 mL/min/1.73    GFR est AA 44 (L) >59 mL/min/1.73    BUN/Creatinine ratio 15 12 - 28    Sodium 147 (H) 134 - 144 mmol/L    Potassium 4.7 3.5 - 5.2 mmol/L      Comment:      Specimen received in contact with cells. No visible hemolysis  present. However GLUC may be decreased and K increased. Clinical  correlation indicated.       Chloride 104 96 - 106 mmol/L    CO2 21 20 - 29 mmol/L    Calcium 11.4 (H) 8.7 - 10.3 mg/dL    Narrative    Performed at:  [address]  [id number]  : Lon Max MD, Phone:  [phone]   CKD REPORT   Result Value Ref Range    Interpretation Note       Comment:      Supplemental report is available. Narrative    Performed at:  [address] [place] [address]  [id number]  : Anne-Marie Hernandez MD, Phone:  [phone]       Your TSH (thyroid test) is normal so keep taking methimazole 1 tab 4x/week.  -------------------------------------------------------------------------------------------------------------------  Your calcium level is high at 11.4 and up from 11.0 at last check. Drink at least 4 8oz glasses of water everyday to stay hydrated to prevent your calcium level from going higher. -------------------------------------------------------------------------------------------------------------------  BUN and creatinine are markers of kidney function. Your creatinine is slightly high likely due to mild dehydration so be sure to drink more water as listed above.

## 2019-01-11 NOTE — TELEPHONE ENCOUNTER
Patient is requesting a call back. She stated that she is having trouble remembering what Dr. Ranjan Meza told her about her medications during her visit today. She can be reached at [phone].

## 2019-01-11 NOTE — PATIENT INSTRUCTIONS
1) I will repeat your thyroid and calcium levels and be in touch next week with the results. 2) Keep taking methimazole for your thyroid 1 tab 4x/week until you hear back from me. 3) Your blood pressure and pulse look great today on the lower dose of diltiazem.

## 2019-01-11 NOTE — TELEPHONE ENCOUNTER
Everything is listed in the instruction sheet from today. I didn't make any changes to her meds yet and I'm waiting for her labs to come back and we'll be in touch next week with any changes.

## 2019-01-12 LAB
BUN SERPL-MCNC: 19 MG/DL (ref 8–27)
BUN/CREAT SERPL: 15 (ref 12–28)
CALCIUM SERPL-MCNC: 11.4 MG/DL (ref 8.7–10.3)
CHLORIDE SERPL-SCNC: 104 MMOL/L (ref 96–106)
CO2 SERPL-SCNC: 21 MMOL/L (ref 20–29)
CREAT SERPL-MCNC: 1.28 MG/DL (ref 0.57–1)
GLUCOSE SERPL-MCNC: 112 MG/DL (ref 65–99)
INTERPRETATION: NORMAL
POTASSIUM SERPL-SCNC: 4.7 MMOL/L (ref 3.5–5.2)
SODIUM SERPL-SCNC: 147 MMOL/L (ref 134–144)
T4 FREE SERPL-MCNC: 1.24 NG/DL (ref 0.82–1.77)
TSH SERPL DL<=0.005 MIU/L-ACNC: 1.07 UIU/ML (ref 0.45–4.5)

## 2019-03-14 ENCOUNTER — TELEPHONE (OUTPATIENT)
Dept: ENDOCRINOLOGY | Age: 84
End: 2019-03-14

## 2019-03-14 NOTE — TELEPHONE ENCOUNTER
----- Message from Adeline Conn sent at 3/14/2019  3:09 PM EDT -----  Regarding: Dr Owen Cervantes  Pt is calling to get copies of her last visit and  test results, mailed to her home her daughter is her POA and in pt notes it states all calls go to the daughter, pt wanted to leave her number, but since it showing her daughter handles all of her calls I providing  the daughter number  [phone]. , Devaughn Ricketts , she said her daughter was sent the paper work  Who lives in [place] and she did not see them so the pt calling on her own to try to get the information. The  number the pt is calling from is  [phone], which is not on file and this number is also a old phone number of a  pt on file, but this is the number the pt is calling from, but  since pt notes states all calls should go to her daughter  dtd 2019 I sending the message in this form.  For Dr Nayeli Soria to decide  what is the best way to handle

## 2019-03-14 NOTE — TELEPHONE ENCOUNTER
Please let patient know I will mail her last office visit that includes her lab results to her home per her request.

## 2019-03-15 NOTE — TELEPHONE ENCOUNTER
Called patient this morning to let her know her office note and lab results were mailed to her home today.

## 2019-06-24 ENCOUNTER — TELEPHONE (OUTPATIENT)
Dept: ENDOCRINOLOGY | Age: 84
End: 2019-06-24

## 2019-06-24 NOTE — TELEPHONE ENCOUNTER
Pt's daughter, Mickey Fatima is asking a return call regarding her mother's care. She stated she needs an opinion regarding Prolia shot for her mother. The shot was recommended with her PCP.  Jennifer Childers can be reach @  [phone]

## 2019-06-24 NOTE — TELEPHONE ENCOUNTER
Please let her know Dr. Alley Charles consulted me about this option for osteoporosis and I told him it was fine to use this and would recommend it so Dr. Alley Charles ordered this based on my recommendation.

## 2019-06-24 NOTE — TELEPHONE ENCOUNTER
Patient daughter stated that Dr. Saima Lima has suggested that her mom should get prolia injection. She would like to know if you think it is a good idea with her history of calcium?

## 2019-07-19 ENCOUNTER — OFFICE VISIT (OUTPATIENT)
Dept: ENDOCRINOLOGY | Age: 84
End: 2019-07-19

## 2019-07-19 VITALS
HEIGHT: 65 IN | SYSTOLIC BLOOD PRESSURE: 144 MMHG | WEIGHT: 125 LBS | HEART RATE: 56 BPM | DIASTOLIC BLOOD PRESSURE: 69 MMHG | BODY MASS INDEX: 20.83 KG/M2

## 2019-07-19 DIAGNOSIS — I10 ESSENTIAL HYPERTENSION: ICD-10-CM

## 2019-07-19 DIAGNOSIS — E05.20 TOXIC MULTINODULAR GOITER: Primary | ICD-10-CM

## 2019-07-19 DIAGNOSIS — E21.3 HYPERPARATHYROIDISM (HCC): ICD-10-CM

## 2019-07-19 NOTE — PATIENT INSTRUCTIONS
1) You can try docusate which is a stool softener over the counter that does not have a laxative      2) Drink at least 4 8oz glasses of flavored water everyday to stay hydrated to prevent your calcium level from going higher. Try cutting back on jeniffer tea. 3) Your TSH (thyroid test) remains normal so the current dose of methimazole 1 tab 4x/week is working well so please stay on this. 4) The prolia will work well to keep your bones strong and prevent fracture in the future.

## 2019-07-19 NOTE — PROGRESS NOTES
Chief Complaint   Patient presents with    Thyroid Problem     PCP and Pharmacy verified     History of Present Illness: Leanne Coronado is a 80 y.o. female here for follow up of thyroid. Weight stable since last visit in 1/19. Her grandson, Kimani Alston, is with her today and is the one who regulates her meds. Compliant with all meds below. No chest pain or palpitations. May still have some infrequent tremors but better than before. Does have constipation and miralax has been too strong for her. Just received her 1st prolia shot on 6/25/19 without difficulty. Current Outpatient Medications   Medication Sig    gabapentin (NEURONTIN) 100 mg capsule TAKE 2 CAPSULES BY MOUTH EVERY NIGHT for nerves    denosumab (PROLIA) 60 mg/mL injection 1 mL by SubCUTAneous route every 6 months. Indications: brittle bones    pantoprazole (PROTONIX) 40 mg tablet TAKE 1 TABLET BY MOUTH DAILY    dilTIAZem XR (DILACOR XR) 120 mg XR capsule Take 1 Cap by mouth daily.  bisoprolol (ZEBETA) 10 mg tablet Take 1 Tab by mouth daily.  lisinopril (PRINIVIL, ZESTRIL) 20 mg tablet Take 1 Tab by mouth daily.  atorvastatin (LIPITOR) 40 mg tablet Take 1 Tab by mouth daily.  methIMAzole (TAPAZOLE) 5 mg tablet Take one tablet on Monday, Wednesday, Friday and Saturday for thyroid    aspirin delayed-release 325 mg tablet Take 1 Tab by mouth daily.  cyanocobalamin (VITAMIN B-12) 1,000 mcg tablet Take 1,000 mcg by mouth daily.  polyethylene glycol (MIRALAX) 17 gram/dose powder Take 17 g by mouth every Monday, Wednesday, Friday. No current facility-administered medications for this visit.       Allergies   Allergen Reactions    Latex Rash    Contrast Agent [Iodine] Unknown (comments) and Hives    Doxycycline Unknown (comments) and Hives    Erythromycin Unknown (comments) and Hives    Macrodantin [Nitrofurantoin Macrocrystalline] Unknown (comments)    Nitrofurantoin Hives    Sulfa (Sulfonamide Antibiotics) Hives Eyes turned red    Sulfasalazine Hives     Eyes turned red    Tamoxifen Other (comments) and Rash     Review of Systems:  - Cardiovascular: no chest pain  - Neurological: no tremors  - Integumentary: skin is normal    Physical Examination:  Blood pressure 144/69, pulse (!) 56, height 5' 5\" (1.651 m), weight 125 lb (56.7 kg). - General: pleasant, no distress, good eye contact   - Neck: small nodular goiter  - Cardiovascular: regular, normal rate, nl s1 and s2, no m/r/g   - Respiratory: clear to auscultation bilaterally   - Integumentary: skin is normal, no edema  - Neurological: reflexes 2+ at biceps, no tremors  - Psychiatric: normal mood and affect    Data Reviewed:   Component      Latest Ref Rng & Units 6/20/2019 6/20/2019 6/20/2019 6/20/2019           3:00 PM  3:00 PM  3:00 PM  3:00 PM   Glucose      65 - 99 mg/dL    92   BUN      8 - 27 mg/dL    22   Creatinine      0.57 - 1.00 mg/dL    0.93   GFR est non-AA      >59 mL/min/1.73    56 (L)   GFR est AA      >59 mL/min/1.73    65   BUN/Creatinine ratio      12 - 28    24   Sodium      134 - 144 mmol/L    144   Potassium      3.5 - 5.2 mmol/L    4.6   Chloride      96 - 106 mmol/L    104   CO2      20 - 29 mmol/L    27   Calcium      8.7 - 10.3 mg/dL    11.1 (H)   Protein, total      6.0 - 8.5 g/dL    6.4   Albumin      3.5 - 4.7 g/dL    3.7   GLOBULIN, TOTAL      1.5 - 4.5 g/dL    2.7   A-G Ratio      1.2 - 2.2    1.4   Bilirubin, total      0.0 - 1.2 mg/dL    0.9   Alk. phosphatase      39 - 117 IU/L    77   AST      0 - 40 IU/L    17   ALT (SGPT)      0 - 32 IU/L    11   T4, Free      0.82 - 1.77 ng/dL   1.31    Triiodothyronine (T3), free      2.0 - 4.4 pg/mL  2.8     TSH      0.450 - 4.500 uIU/mL 0.894          Assessment/Plan:     1. Toxic multinodular goiter:  In March 2015, she was found to have a low TSH of 0.16 and this was just watched over time and her TSH slowly drifted down over the next year and was 0.06 in 4/16.   Had a thyroid ultrasound in 6/16 at [facility] that showed a multinodular goiter with largest nodule of 2.9 cm in the right mid lobe. Her TSH was down to 0.05 in 10/16 and Dr. Izaiah Avendano decided to give a trial of methimazole 5 mg once daily and with taking this she started feeling better and energy was improving and tremors were improving. Started to gain some of the weight back she had lost which she estimated to be over 15 lbs. However, she developed some watering and redness of her eyes and small areas of intermittent rash on the arms and legs in Jan 2017 and was advised to stop the methimazole and her symptoms went away. Her TSH was back to normal at 0.68 in 1/17. With coming off the methimazole she started feeling worse again and was referred to Dr. Lizabeth Davis whom she saw on 4/20/17. He repeated labs that showed her TSH was down to 0.15 with a normal FT4 of 1.27 and T3 of 127. He ordered a thyroid ultrasound that showed a right parathyroid atypical mass vs lymph node and was sent for biopsy of her right lobe nodule and this was done on 5/1/17 and came back benign and she was sent to me for further evaluation. It sounds like she has developed hyperthyroidism from her nodules and with the methimazole, she was able to normalize her TSH and symptoms were improving. It's unclear if her mild rash and watering and redness of her eyes was truly from the methimazole and she was willing to restart the methimazole at a lower dose of 5 mg 4x/week at her first visit with me in 5/17. TSH 0.88 in 7/17 but had more rash on her back so decreased to 3x/week and rash improved and TSH 0.57 in 10/17 so kept her dose the same. TSH down to 0.27 in 4/18 so increased to 4x/week. TSH up to 0.31 in 7/18 but down to 0.17 in 10/18. Back up to 1.07 in 1/19 and 0.89 in 6/19 so kept her dose the same. If her level is still low, will go back up further on her dose. I would not favor surgery given her age and other co-morbidities.   - cont methimazole 5 mg 4x/week  - check TSH and free T4 prior to next visit at Dr. Mock Chain office     2. Hyperparathyroidism [facility]): Has had high calcium levels in the 11-11.7 range over the past 2 years in our system with a PTH of 88 in 12/15 and has been 36-53 range on repeat testing. However, has been told at least 15 years ago that her calcium level was high. Did have one kidney stone years ago. Is on vitamin D 1000 units daily but no calcium supplements or mvi. Vitamin D was 57 in 7/15. Her 24 hour urine calcium was low at 49 in 7/15. It's possible the hyperthyroidism may be exacerbating the hypercalcemia so hopefully with addressing this as above, this will help with the calcium. I had her stop the vitamin D to avoid hyperabsorption of dietary calcium in 5/17 and calcium was 11.6 in 7/17 but down to 10.9 in 10/17 and stable in 4/18. Down to 10.3 in 8/18 but up to 11 in 10/18 and 11.4 in 1/19 and 11.1 in 6/19. Treatment of this condition is surgical and would not want to pursue this unless her calcium kecia over 12.  - drink plenty of water   - check bmp prior to next visit      3. Hypertension: BP just over 140/90 so won't make any changes. - cont diltiazem 120 mg in the morning  - cont zebeta 10 mg at night  - cont lisinopril 20 mg daily      Patient Instructions   1) You can try docusate which is a stool softener over the counter that does not have a laxative      2) Drink at least 4 8oz glasses of flavored water everyday to stay hydrated to prevent your calcium level from going higher. Try cutting back on jeniffer tea. 3) Your TSH (thyroid test) remains normal so the current dose of methimazole 1 tab 4x/week is working well so please stay on this. 4) The prolia will work well to keep your bones strong and prevent fracture in the future. Follow-up and Dispositions    · Return in about 6 months (around 1/19/2020).                  Copy sent to:  Pradeep Smith MD

## 2020-01-01 ENCOUNTER — TRANSCRIBE ORDER (OUTPATIENT)
Dept: FAMILY MEDICINE CLINIC | Age: 85
End: 2020-01-01

## 2020-01-16 ENCOUNTER — OFFICE VISIT (OUTPATIENT)
Dept: SURGERY | Age: 85
End: 2020-01-16

## 2020-01-16 VITALS
HEART RATE: 85 BPM | BODY MASS INDEX: 19.76 KG/M2 | WEIGHT: 118.6 LBS | DIASTOLIC BLOOD PRESSURE: 90 MMHG | OXYGEN SATURATION: 98 % | HEIGHT: 65 IN | TEMPERATURE: 98 F | RESPIRATION RATE: 14 BRPM | SYSTOLIC BLOOD PRESSURE: 167 MMHG

## 2020-01-16 DIAGNOSIS — R19.7 DIARRHEA, UNSPECIFIED TYPE: ICD-10-CM

## 2020-01-16 DIAGNOSIS — R11.2 NON-INTRACTABLE VOMITING WITH NAUSEA, UNSPECIFIED VOMITING TYPE: ICD-10-CM

## 2020-01-16 DIAGNOSIS — K80.20 CALCULUS OF GALLBLADDER WITHOUT CHOLECYSTITIS WITHOUT OBSTRUCTION: ICD-10-CM

## 2020-01-16 DIAGNOSIS — K21.9 GASTROESOPHAGEAL REFLUX DISEASE, ESOPHAGITIS PRESENCE NOT SPECIFIED: Primary | ICD-10-CM

## 2020-01-16 RX ORDER — SCOLOPAMINE TRANSDERMAL SYSTEM 1 MG/1
1 PATCH, EXTENDED RELEASE TRANSDERMAL
Qty: 1 PATCH | Refills: 2 | Status: SHIPPED | OUTPATIENT
Start: 2020-01-16

## 2020-01-16 NOTE — LETTER
1/21/20 Patient: Fransisco Bai YOB: 1933 Date of Visit: 1/16/2020 Olaf Santos MD 
[address] [facility],[address] [address] VIA In Basket Dear Olaf Santos MD, Thank you for referring Ms. Sadie Harrell to [address] [facility] for evaluation. My notes for this consultation are attached. If you have questions, please do not hesitate to call me. I look forward to following your patient along with you.  
 
 
Sincerely, 
 
Aida Bradford MD

## 2020-01-21 NOTE — PROGRESS NOTES
CC:  Diarrhea and vomiting    HPI  Leanne MITZY Fabian is a 80 y.o. female seen in the ER on 1/13/20 for intractable vomiting and diarrhea. Workup was negative except for some gallstones noted and slight thickening of the wall to 3 mm. There was no pericholecystic fluid or edema noted. The CBD was slightly enlarged at 9 mm. WBC was 13.6. Bili was 1.7 and LFTS were normal. Her symptoms complex sounded more like gastroenteritis because of the diarrhea and vomiting. There was no abdominal pain. She was placed on Zofran, bentyl and Levaquin and then seen by her PMD on 1/15/20. At that time it appears that vomiting and diarrhea were not much of an issue but the nausea was keeping her from eating much. She denies any fever, chills, jaundice,tea colored urine or mitzi colored stools. The vomiting has stopped but her grandson questions if it is the Zofran or just not eating. She does not want to eat because she has lost her appetite and food does not look good. She is afraid it will make her sick. She is taking the Levaquin and review of her meds show loperamide but she is unsure who gave that to her. The diarrhea is dark but not black. The emesis was dark also one time. She denies any dysphagia and is on Protonix. She had a stroke in July 2018 but has not had any swallowing issues from that. She has not taken the lomotil or the Bently. She was also on Miralax but has not taken any since the diarrhea started. She denies any abdominal pain. ROS   As outlined above    PE  Visit Vitals  /90 (BP 1 Location: Left arm, BP Patient Position: Sitting)   Pulse 85   Temp 98 °F (36.7 °C)   Resp 14   Ht 5' 5\" (1.651 m)   Wt 118 lb 9.6 oz (53.8 kg)   SpO2 98%   BMI 19.74 kg/m²     Abdomen benign and non tender  No evidence of dehydration, mouth moist, etc.    IMP  Long discussion held with patient and grandson who had lots of questions and was very persistent.   I do not feel that the ultrasound findings match her symptom complex. The wall thickening is very minor and she has no abdominal pain. Her symptoms sound more like gastroenteritis which appears to have resolved but now we are left with a thin elderly lady who is afraid to eat. She has not been taking all of her meds and specifically not her Protonix. Her grandson does not believe the Zofran is working. We discussed that the nausea could be from no Protonix and no food. We discussed breaking the vicious cycle of nausea due to not eating. We talked about the gallbladder and how it typically will give symptoms for months to years before diagnosis. She has had no food intolerance or any symptoms till this ER visit. She has eaten anything she wants. The grandson is still not convinced it is not her gallbladder. I explained that a major surgery that is not needed in some one with a stroke history is not to be taken lightly. PLAN  We compromised with a repeat ultrasound in 2 weeks after the antibiotics to see how it looks. I ordered a scopolamine patch for her as any other antinausea med would make her too sleepy and could cause her to fall. I explained to the patient the need to eat, no matter how she feels. I suggested eggs, peanut butter, cheese, oatmeal,ginger ale, crackers. Stressed to grandson not to let her get dehydrated and that it he felt she was not drinking enough to take her back to the ER for evaluation. Spent over half of the 30 minute visit in discussion and coordination of care of wound, including treatment plans, interventions patient can do to help healing and symptoms to watch out for and when to be seen immediately.

## 2020-01-30 ENCOUNTER — OFFICE VISIT (OUTPATIENT)
Dept: SURGERY | Age: 85
End: 2020-01-30

## 2020-01-30 VITALS
BODY MASS INDEX: 19.78 KG/M2 | HEART RATE: 72 BPM | HEIGHT: 65 IN | DIASTOLIC BLOOD PRESSURE: 75 MMHG | WEIGHT: 118.7 LBS | SYSTOLIC BLOOD PRESSURE: 153 MMHG

## 2020-01-30 DIAGNOSIS — R63.0 ANOREXIA: Primary | ICD-10-CM

## 2020-01-30 DIAGNOSIS — K59.04 CHRONIC IDIOPATHIC CONSTIPATION: ICD-10-CM

## 2020-01-30 DIAGNOSIS — R11.2 NON-INTRACTABLE VOMITING WITH NAUSEA, UNSPECIFIED VOMITING TYPE: ICD-10-CM

## 2020-01-30 DIAGNOSIS — K80.20 CALCULUS OF GALLBLADDER WITHOUT CHOLECYSTITIS WITHOUT OBSTRUCTION: ICD-10-CM

## 2020-01-30 NOTE — PROGRESS NOTES
1. Have you been to the ER, urgent care clinic since your last visit? Hospitalized since your last visit? No    2. Have you seen or consulted any other health care providers outside of the [address] [place] [address] since your last visit? Include any pap smears or colon screening.  No

## 2020-01-31 NOTE — PROGRESS NOTES
Eating a little better. Nausea gone. Using the patch helped a lot. Bowels are still a little sluggish and only about every other day. She takes a daily stool softener and occasional Miralax. She has gotten back on Protonix and [address] [place] [address]. She is not on Benefiber now. She gets some LLQ pressure that she feels like is stool that is not emptying. She denies any food intolerance or RUQ pain. She has good and bad days but overall is better. We discussed that without symptoms of gallbladder disease, I do not think we need any more workup unless things change. Grandson agrees wit plans    Discussed diet and eating and giving it time. Spent over half of the 15 minute visit in discussion of diet, including treatment plans, interventions patient can do to help health and symptoms to watch out for and when to be seen immediately.

## 2020-03-12 PROBLEM — N18.30 CKD (CHRONIC KIDNEY DISEASE) STAGE 3, GFR 30-59 ML/MIN (HCC): Status: ACTIVE | Noted: 2020-03-12

## 2020-03-12 PROBLEM — Z85.3 HISTORY OF BREAST CANCER: Status: ACTIVE | Noted: 2020-03-12

## 2020-07-31 PROBLEM — E78.2 MIXED HYPERLIPIDEMIA: Status: ACTIVE | Noted: 2020-07-31

## 2021-01-01 ENCOUNTER — TELEPHONE (OUTPATIENT)
Dept: FAMILY MEDICINE CLINIC | Age: 86
End: 2021-01-01

## 2021-01-01 ENCOUNTER — HOSPITAL ENCOUNTER (INPATIENT)
Age: 86
LOS: 5 days | DRG: 065 | End: 2021-10-28
Attending: GENERAL ACUTE CARE HOSPITAL | Admitting: INTERNAL MEDICINE
Payer: MEDICARE

## 2021-01-01 ENCOUNTER — APPOINTMENT (OUTPATIENT)
Dept: CT IMAGING | Age: 86
End: 2021-01-01
Attending: FAMILY MEDICINE
Payer: MEDICARE

## 2021-01-01 ENCOUNTER — APPOINTMENT (OUTPATIENT)
Dept: GENERAL RADIOLOGY | Age: 86
End: 2021-01-01
Attending: FAMILY MEDICINE
Payer: MEDICARE

## 2021-01-01 ENCOUNTER — APPOINTMENT (OUTPATIENT)
Dept: MRI IMAGING | Age: 86
DRG: 065 | End: 2021-01-01
Attending: INTERNAL MEDICINE
Payer: MEDICARE

## 2021-01-01 ENCOUNTER — HOSPITAL ENCOUNTER (OUTPATIENT)
Dept: INFUSION THERAPY | Age: 86
Discharge: HOME | End: 2021-04-12

## 2021-01-01 ENCOUNTER — OFFICE VISIT (OUTPATIENT)
Dept: FAMILY MEDICINE CLINIC | Age: 86
End: 2021-01-01
Payer: MEDICARE

## 2021-01-01 ENCOUNTER — HOSPITAL ENCOUNTER (EMERGENCY)
Age: 86
Discharge: ANOTHER HOSPITAL | End: 2021-10-23
Attending: FAMILY MEDICINE
Payer: MEDICARE

## 2021-01-01 ENCOUNTER — HOSPITAL ENCOUNTER (EMERGENCY)
Age: 86
Discharge: HOME | End: 2021-03-26
Attending: EMERGENCY MEDICINE
Payer: MEDICARE

## 2021-01-01 ENCOUNTER — APPOINTMENT (OUTPATIENT)
Dept: VASCULAR SURGERY | Age: 86
DRG: 065 | End: 2021-01-01
Attending: PSYCHIATRY & NEUROLOGY
Payer: MEDICARE

## 2021-01-01 ENCOUNTER — PATIENT MESSAGE (OUTPATIENT)
Dept: FAMILY MEDICINE CLINIC | Age: 86
End: 2021-01-01

## 2021-01-01 ENCOUNTER — APPOINTMENT (OUTPATIENT)
Dept: GENERAL RADIOLOGY | Age: 86
DRG: 065 | End: 2021-01-01
Attending: GENERAL ACUTE CARE HOSPITAL
Payer: MEDICARE

## 2021-01-01 ENCOUNTER — DOCUMENTATION ONLY (OUTPATIENT)
Dept: FAMILY MEDICINE CLINIC | Age: 86
End: 2021-01-01

## 2021-01-01 VITALS
HEART RATE: 146 BPM | RESPIRATION RATE: 22 BRPM | SYSTOLIC BLOOD PRESSURE: 161 MMHG | DIASTOLIC BLOOD PRESSURE: 112 MMHG | TEMPERATURE: 97.7 F | OXYGEN SATURATION: 94 %

## 2021-01-01 VITALS
DIASTOLIC BLOOD PRESSURE: 101 MMHG | RESPIRATION RATE: 16 BRPM | TEMPERATURE: 98.3 F | HEIGHT: 67 IN | WEIGHT: 130 LBS | OXYGEN SATURATION: 100 % | HEART RATE: 85 BPM | BODY MASS INDEX: 20.4 KG/M2 | SYSTOLIC BLOOD PRESSURE: 192 MMHG

## 2021-01-01 VITALS
TEMPERATURE: 97.3 F | BODY MASS INDEX: 21.66 KG/M2 | HEIGHT: 65 IN | HEART RATE: 87 BPM | WEIGHT: 130 LBS | DIASTOLIC BLOOD PRESSURE: 80 MMHG | OXYGEN SATURATION: 98 % | SYSTOLIC BLOOD PRESSURE: 130 MMHG

## 2021-01-01 VITALS
DIASTOLIC BLOOD PRESSURE: 91 MMHG | HEART RATE: 94 BPM | OXYGEN SATURATION: 97 % | WEIGHT: 120 LBS | TEMPERATURE: 97.9 F | RESPIRATION RATE: 16 BRPM | HEIGHT: 66 IN | BODY MASS INDEX: 19.29 KG/M2 | SYSTOLIC BLOOD PRESSURE: 155 MMHG

## 2021-01-01 VITALS
BODY MASS INDEX: 18.58 KG/M2 | SYSTOLIC BLOOD PRESSURE: 146 MMHG | TEMPERATURE: 97.8 F | HEIGHT: 67 IN | HEART RATE: 86 BPM | OXYGEN SATURATION: 97 % | DIASTOLIC BLOOD PRESSURE: 88 MMHG | RESPIRATION RATE: 18 BRPM | WEIGHT: 118.4 LBS

## 2021-01-01 VITALS
TEMPERATURE: 98 F | SYSTOLIC BLOOD PRESSURE: 120 MMHG | DIASTOLIC BLOOD PRESSURE: 82 MMHG | HEART RATE: 82 BPM | OXYGEN SATURATION: 97 %

## 2021-01-01 DIAGNOSIS — R56.9 SEIZURE (HCC): ICD-10-CM

## 2021-01-01 DIAGNOSIS — M81.0 AGE-RELATED OSTEOPOROSIS WITHOUT CURRENT PATHOLOGICAL FRACTURE: ICD-10-CM

## 2021-01-01 DIAGNOSIS — R54 FRAILTY SYNDROME IN GERIATRIC PATIENT: ICD-10-CM

## 2021-01-01 DIAGNOSIS — I10 ESSENTIAL HYPERTENSION: ICD-10-CM

## 2021-01-01 DIAGNOSIS — Z71.89 GOALS OF CARE, COUNSELING/DISCUSSION: ICD-10-CM

## 2021-01-01 DIAGNOSIS — G40.901 STATUS EPILEPTICUS (HCC): ICD-10-CM

## 2021-01-01 DIAGNOSIS — E78.2 MIXED HYPERLIPIDEMIA: ICD-10-CM

## 2021-01-01 DIAGNOSIS — I63.312 CEREBROVASCULAR ACCIDENT (CVA) DUE TO THROMBOSIS OF LEFT MIDDLE CEREBRAL ARTERY (HCC): ICD-10-CM

## 2021-01-01 DIAGNOSIS — Z48.02 ENCOUNTER FOR REMOVAL OF SUTURES: Primary | ICD-10-CM

## 2021-01-01 DIAGNOSIS — Z51.89 VISIT FOR WOUND CHECK: ICD-10-CM

## 2021-01-01 DIAGNOSIS — Z51.5 ENCOUNTER FOR END OF LIFE CARE: ICD-10-CM

## 2021-01-01 DIAGNOSIS — N18.30 STAGE 3 CHRONIC KIDNEY DISEASE, UNSPECIFIED WHETHER STAGE 3A OR 3B CKD (HCC): ICD-10-CM

## 2021-01-01 DIAGNOSIS — I63.312 CEREBROVASCULAR ACCIDENT (CVA) DUE TO THROMBOSIS OF LEFT MIDDLE CEREBRAL ARTERY (HCC): Primary | ICD-10-CM

## 2021-01-01 DIAGNOSIS — K59.04 CHRONIC IDIOPATHIC CONSTIPATION: ICD-10-CM

## 2021-01-01 DIAGNOSIS — Z13.31 SCREENING FOR DEPRESSION: ICD-10-CM

## 2021-01-01 DIAGNOSIS — E05.20 TOXIC MULTINODULAR GOITER: ICD-10-CM

## 2021-01-01 DIAGNOSIS — E83.52 HYPERCALCEMIA: ICD-10-CM

## 2021-01-01 DIAGNOSIS — R47.01 APHASIA: ICD-10-CM

## 2021-01-01 DIAGNOSIS — Z00.00 MEDICARE ANNUAL WELLNESS VISIT, SUBSEQUENT: Primary | ICD-10-CM

## 2021-01-01 DIAGNOSIS — F01.50 VASCULAR DEMENTIA WITHOUT BEHAVIORAL DISTURBANCE (HCC): ICD-10-CM

## 2021-01-01 DIAGNOSIS — R47.01 EXPRESSIVE APHASIA: ICD-10-CM

## 2021-01-01 DIAGNOSIS — S91.209A NAIL AVULSION OF TOE, INITIAL ENCOUNTER: Primary | ICD-10-CM

## 2021-01-01 DIAGNOSIS — G40.901 STATUS EPILEPTICUS (HCC): Primary | ICD-10-CM

## 2021-01-01 DIAGNOSIS — Z13.39 SCREENING FOR ALCOHOLISM: ICD-10-CM

## 2021-01-01 DIAGNOSIS — I63.412 CEREBROVASCULAR ACCIDENT (CVA) DUE TO EMBOLISM OF LEFT MIDDLE CEREBRAL ARTERY (HCC): ICD-10-CM

## 2021-01-01 DIAGNOSIS — F80.2 WERNICKE'S APHASIA: Primary | ICD-10-CM

## 2021-01-01 DIAGNOSIS — I67.9 CEREBRAL VASCULAR DISEASE: ICD-10-CM

## 2021-01-01 LAB
ALBUMIN SERPL-MCNC: 2.6 G/DL (ref 3.5–5)
ALBUMIN SERPL-MCNC: 2.9 G/DL (ref 3.5–5)
ALBUMIN SERPL-MCNC: 3.4 G/DL (ref 3.5–5)
ALBUMIN/GLOB SERPL: 0.7 {RATIO} (ref 1.1–2.2)
ALBUMIN/GLOB SERPL: 0.9 {RATIO} (ref 1.1–2.2)
ALBUMIN/GLOB SERPL: 1 {RATIO} (ref 1.1–2.2)
ALP SERPL-CCNC: 73 U/L (ref 45–117)
ALP SERPL-CCNC: 77 U/L (ref 45–117)
ALP SERPL-CCNC: 83 U/L (ref 45–117)
ALT SERPL-CCNC: 14 U/L (ref 12–78)
ALT SERPL-CCNC: 16 U/L (ref 12–78)
ALT SERPL-CCNC: 9 U/L (ref 12–78)
AMPHET UR QL SCN: NEGATIVE
ANION GAP SERPL CALC-SCNC: 2 MMOL/L (ref 5–15)
ANION GAP SERPL CALC-SCNC: 4 MMOL/L (ref 5–15)
ANION GAP SERPL CALC-SCNC: 4 MMOL/L (ref 5–15)
ANION GAP SERPL CALC-SCNC: 5 MMOL/L (ref 5–15)
ANION GAP SERPL CALC-SCNC: 7 MMOL/L (ref 5–15)
APPEARANCE UR: ABNORMAL
APPEARANCE UR: CLEAR
AST SERPL-CCNC: 17 U/L (ref 15–37)
AST SERPL-CCNC: 18 U/L (ref 15–37)
AST SERPL-CCNC: 31 U/L (ref 15–37)
ATRIAL RATE: 93 BPM
BACTERIA SPEC CULT: ABNORMAL
BACTERIA SPEC CULT: ABNORMAL
BACTERIA URNS QL MICRO: ABNORMAL /HPF
BARBITURATES UR QL SCN: NEGATIVE
BASOPHILS # BLD: 0 K/UL (ref 0–0.1)
BASOPHILS # BLD: 0 K/UL (ref 0–0.1)
BASOPHILS # BLD: 0.1 K/UL (ref 0–0.1)
BASOPHILS NFR BLD: 0 % (ref 0–1)
BASOPHILS NFR BLD: 1 % (ref 0–1)
BASOPHILS NFR BLD: 1 % (ref 0–1)
BENZODIAZ UR QL: POSITIVE
BILIRUB SERPL-MCNC: 1.1 MG/DL (ref 0.2–1)
BILIRUB SERPL-MCNC: 1.4 MG/DL (ref 0.2–1)
BILIRUB SERPL-MCNC: 1.4 MG/DL (ref 0.2–1)
BILIRUB UR QL: NEGATIVE
BILIRUB UR QL: NEGATIVE
BUN SERPL-MCNC: 11 MG/DL (ref 6–20)
BUN SERPL-MCNC: 12 MG/DL (ref 6–20)
BUN SERPL-MCNC: 15 MG/DL (ref 6–20)
BUN SERPL-MCNC: 21 MG/DL (ref 6–20)
BUN SERPL-MCNC: 39 MG/DL (ref 6–20)
BUN/CREAT SERPL: 11 (ref 12–20)
BUN/CREAT SERPL: 13 (ref 12–20)
BUN/CREAT SERPL: 21 (ref 12–20)
BUN/CREAT SERPL: 38 (ref 12–20)
BUN/CREAT SERPL: 9 (ref 12–20)
CALCIUM SERPL-MCNC: 10.1 MG/DL (ref 8.5–10.1)
CALCIUM SERPL-MCNC: 10.3 MG/DL (ref 8.5–10.1)
CALCIUM SERPL-MCNC: 10.7 MG/DL (ref 8.5–10.1)
CALCIUM SERPL-MCNC: 10.9 MG/DL (ref 8.5–10.1)
CALCIUM SERPL-MCNC: 11.8 MG/DL (ref 8.5–10.1)
CALCULATED P AXIS, ECG09: 40 DEGREES
CALCULATED R AXIS, ECG10: -10 DEGREES
CALCULATED T AXIS, ECG11: 31 DEGREES
CANNABINOIDS UR QL SCN: NEGATIVE
CC UR VC: ABNORMAL
CHLORIDE SERPL-SCNC: 106 MMOL/L (ref 97–108)
CHLORIDE SERPL-SCNC: 109 MMOL/L (ref 97–108)
CHLORIDE SERPL-SCNC: 110 MMOL/L (ref 97–108)
CHLORIDE SERPL-SCNC: 110 MMOL/L (ref 97–108)
CHLORIDE SERPL-SCNC: 118 MMOL/L (ref 97–108)
CHOLEST SERPL-MCNC: 113 MG/DL
CHOLEST SERPL-MCNC: 88 MG/DL
CK SERPL-CCNC: 394 U/L (ref 26–192)
CK SERPL-CCNC: 65 U/L (ref 26–192)
CO2 SERPL-SCNC: 22 MMOL/L (ref 21–32)
CO2 SERPL-SCNC: 22 MMOL/L (ref 21–32)
CO2 SERPL-SCNC: 23 MMOL/L (ref 21–32)
CO2 SERPL-SCNC: 32 MMOL/L (ref 21–32)
CO2 SERPL-SCNC: 34 MMOL/L (ref 21–32)
COCAINE UR QL SCN: NEGATIVE
COLOR UR: ABNORMAL
COLOR UR: ABNORMAL
CREAT SERPL-MCNC: 0.92 MG/DL (ref 0.55–1.02)
CREAT SERPL-MCNC: 1.01 MG/DL (ref 0.55–1.02)
CREAT SERPL-MCNC: 1.04 MG/DL (ref 0.55–1.02)
CREAT SERPL-MCNC: 1.28 MG/DL (ref 0.55–1.02)
CREAT SERPL-MCNC: 1.37 MG/DL (ref 0.55–1.02)
DIAGNOSIS, 93000: NORMAL
DIFFERENTIAL METHOD BLD: ABNORMAL
DIFFERENTIAL METHOD BLD: NORMAL
DRUG SCRN COMMENT,DRGCM: ABNORMAL
EOSINOPHIL # BLD: 0 K/UL (ref 0–0.4)
EOSINOPHIL # BLD: 0.3 K/UL (ref 0–0.4)
EOSINOPHIL # BLD: 0.5 K/UL (ref 0–0.4)
EOSINOPHIL NFR BLD: 0 % (ref 0–7)
EOSINOPHIL NFR BLD: 4 % (ref 0–7)
EOSINOPHIL NFR BLD: 5 % (ref 0–7)
EPITH CASTS URNS QL MICRO: ABNORMAL /LPF
ERYTHROCYTE [DISTWIDTH] IN BLOOD BY AUTOMATED COUNT: 13.8 % (ref 11.5–14.5)
ERYTHROCYTE [DISTWIDTH] IN BLOOD BY AUTOMATED COUNT: 13.8 % (ref 11.5–14.5)
ERYTHROCYTE [DISTWIDTH] IN BLOOD BY AUTOMATED COUNT: 13.9 % (ref 11.5–14.5)
ERYTHROCYTE [DISTWIDTH] IN BLOOD BY AUTOMATED COUNT: 13.9 % (ref 11.5–14.5)
ERYTHROCYTE [DISTWIDTH] IN BLOOD BY AUTOMATED COUNT: 14.2 % (ref 11.5–14.5)
EST. AVERAGE GLUCOSE BLD GHB EST-MCNC: 105 MG/DL
ETHANOL SERPL-MCNC: <10 MG/DL
FLUAV RNA SPEC QL NAA+PROBE: NOT DETECTED
FLUBV RNA SPEC QL NAA+PROBE: NOT DETECTED
GLOBULIN SER CALC-MCNC: 3.1 G/DL (ref 2–4)
GLOBULIN SER CALC-MCNC: 3.4 G/DL (ref 2–4)
GLOBULIN SER CALC-MCNC: 3.8 G/DL (ref 2–4)
GLUCOSE BLD STRIP.AUTO-MCNC: 100 MG/DL (ref 65–117)
GLUCOSE BLD STRIP.AUTO-MCNC: 102 MG/DL (ref 65–117)
GLUCOSE BLD STRIP.AUTO-MCNC: 107 MG/DL (ref 65–117)
GLUCOSE BLD STRIP.AUTO-MCNC: 108 MG/DL (ref 65–117)
GLUCOSE BLD STRIP.AUTO-MCNC: 112 MG/DL (ref 65–117)
GLUCOSE BLD STRIP.AUTO-MCNC: 116 MG/DL (ref 65–117)
GLUCOSE BLD STRIP.AUTO-MCNC: 117 MG/DL (ref 65–117)
GLUCOSE BLD STRIP.AUTO-MCNC: 118 MG/DL (ref 65–117)
GLUCOSE BLD STRIP.AUTO-MCNC: 118 MG/DL (ref 65–117)
GLUCOSE BLD STRIP.AUTO-MCNC: 134 MG/DL (ref 65–117)
GLUCOSE BLD STRIP.AUTO-MCNC: 138 MG/DL (ref 65–117)
GLUCOSE BLD STRIP.AUTO-MCNC: 91 MG/DL (ref 65–117)
GLUCOSE SERPL-MCNC: 102 MG/DL (ref 65–100)
GLUCOSE SERPL-MCNC: 108 MG/DL (ref 65–100)
GLUCOSE SERPL-MCNC: 123 MG/DL (ref 65–100)
GLUCOSE SERPL-MCNC: 143 MG/DL (ref 65–100)
GLUCOSE SERPL-MCNC: 96 MG/DL (ref 65–100)
GLUCOSE UR STRIP.AUTO-MCNC: NEGATIVE MG/DL
GLUCOSE UR STRIP.AUTO-MCNC: NEGATIVE MG/DL
HBA1C MFR BLD: 5.3 % (ref 4–5.6)
HCT VFR BLD AUTO: 33.4 % (ref 35–47)
HCT VFR BLD AUTO: 36.6 % (ref 35–47)
HCT VFR BLD AUTO: 38 % (ref 35–47)
HCT VFR BLD AUTO: 38.3 % (ref 35–47)
HCT VFR BLD AUTO: 42.2 % (ref 35–47)
HDLC SERPL-MCNC: 56 MG/DL
HDLC SERPL-MCNC: 60 MG/DL
HDLC SERPL: 1.6 {RATIO} (ref 0–5)
HDLC SERPL: 1.9 {RATIO} (ref 0–5)
HGB BLD-MCNC: 11.1 G/DL (ref 11.5–16)
HGB BLD-MCNC: 11.6 G/DL (ref 11.5–16)
HGB BLD-MCNC: 12.2 G/DL (ref 11.5–16)
HGB BLD-MCNC: 12.3 G/DL (ref 11.5–16)
HGB BLD-MCNC: 12.7 G/DL (ref 11.5–16)
HGB UR QL STRIP: ABNORMAL
HGB UR QL STRIP: NEGATIVE
HYALINE CASTS URNS QL MICRO: ABNORMAL /LPF (ref 0–5)
IMM GRANULOCYTES # BLD AUTO: 0 K/UL (ref 0–0.04)
IMM GRANULOCYTES # BLD AUTO: 0.1 K/UL (ref 0–0.04)
IMM GRANULOCYTES # BLD AUTO: 0.2 K/UL (ref 0–0.04)
IMM GRANULOCYTES NFR BLD AUTO: 0 % (ref 0–0.5)
IMM GRANULOCYTES NFR BLD AUTO: 1 % (ref 0–0.5)
KETONES UR QL STRIP.AUTO: NEGATIVE MG/DL
KETONES UR QL STRIP.AUTO: NEGATIVE MG/DL
LACTATE SERPL-SCNC: 2.9 MMOL/L (ref 0.4–2)
LACTATE SERPL-SCNC: 4.7 MMOL/L (ref 0.4–2)
LDLC SERPL CALC-MCNC: 14.8 MG/DL (ref 0–100)
LDLC SERPL CALC-MCNC: 34.8 MG/DL (ref 0–100)
LEFT CCA DIST DIAS: 7.7 CM/S
LEFT CCA DIST SYS: 41.6 CM/S
LEFT CCA PROX DIAS: 6.4 CM/S
LEFT CCA PROX SYS: 45.5 CM/S
LEFT ECA DIAS: 5.1 CM/S
LEFT ECA SYS: 52 CM/S
LEFT ICA DIST DIAS: 15.5 CM/S
LEFT ICA DIST SYS: 72.9 CM/S
LEFT ICA MID DIAS: 22 CM/S
LEFT ICA MID SYS: 59.9 CM/S
LEFT ICA PROX DIAS: 10.3 CM/S
LEFT ICA PROX SYS: 29.9 CM/S
LEFT ICA/CCA SYS: 1.8
LEFT VERTEBRAL DIAS: 10.3 CM/S
LEFT VERTEBRAL SYS: 33.8 CM/S
LEUKOCYTE ESTERASE UR QL STRIP.AUTO: ABNORMAL
LEUKOCYTE ESTERASE UR QL STRIP.AUTO: NEGATIVE
LYMPHOCYTES # BLD: 0.8 K/UL (ref 0.8–3.5)
LYMPHOCYTES # BLD: 1 K/UL (ref 0.8–3.5)
LYMPHOCYTES # BLD: 1.5 K/UL (ref 0.8–3.5)
LYMPHOCYTES # BLD: 1.8 K/UL (ref 0.8–3.5)
LYMPHOCYTES # BLD: 1.9 K/UL (ref 0.8–3.5)
LYMPHOCYTES NFR BLD: 13 % (ref 12–49)
LYMPHOCYTES NFR BLD: 15 % (ref 12–49)
LYMPHOCYTES NFR BLD: 16 % (ref 12–49)
LYMPHOCYTES NFR BLD: 23 % (ref 12–49)
LYMPHOCYTES NFR BLD: 6 % (ref 12–49)
MAGNESIUM SERPL-MCNC: 1.7 MG/DL (ref 1.6–2.4)
MAGNESIUM SERPL-MCNC: 1.7 MG/DL (ref 1.6–2.4)
MAGNESIUM SERPL-MCNC: 2.1 MG/DL (ref 1.6–2.4)
MCH RBC QN AUTO: 28.7 PG (ref 26–34)
MCH RBC QN AUTO: 29 PG (ref 26–34)
MCH RBC QN AUTO: 29.1 PG (ref 26–34)
MCH RBC QN AUTO: 29.8 PG (ref 26–34)
MCH RBC QN AUTO: 29.9 PG (ref 26–34)
MCHC RBC AUTO-ENTMCNC: 30.1 G/DL (ref 30–36.5)
MCHC RBC AUTO-ENTMCNC: 31.7 G/DL (ref 30–36.5)
MCHC RBC AUTO-ENTMCNC: 32.1 G/DL (ref 30–36.5)
MCHC RBC AUTO-ENTMCNC: 32.1 G/DL (ref 30–36.5)
MCHC RBC AUTO-ENTMCNC: 33.2 G/DL (ref 30–36.5)
MCV RBC AUTO: 90 FL (ref 80–99)
MCV RBC AUTO: 90.6 FL (ref 80–99)
MCV RBC AUTO: 90.7 FL (ref 80–99)
MCV RBC AUTO: 92.7 FL (ref 80–99)
MCV RBC AUTO: 96.3 FL (ref 80–99)
METHADONE UR QL: NEGATIVE
MONOCYTES # BLD: 0.5 K/UL (ref 0–1)
MONOCYTES # BLD: 0.6 K/UL (ref 0–1)
MONOCYTES # BLD: 0.8 K/UL (ref 0–1)
MONOCYTES # BLD: 1 K/UL (ref 0–1)
MONOCYTES # BLD: 1.1 K/UL (ref 0–1)
MONOCYTES NFR BLD: 7 % (ref 5–13)
MONOCYTES NFR BLD: 8 % (ref 5–13)
MONOCYTES NFR BLD: 9 % (ref 5–13)
NEUTS SEG # BLD: 11.9 K/UL (ref 1.8–8)
NEUTS SEG # BLD: 4.7 K/UL (ref 1.8–8)
NEUTS SEG # BLD: 5.3 K/UL (ref 1.8–8)
NEUTS SEG # BLD: 8.5 K/UL (ref 1.8–8)
NEUTS SEG # BLD: 8.5 K/UL (ref 1.8–8)
NEUTS SEG NFR BLD: 64 % (ref 32–75)
NEUTS SEG NFR BLD: 73 % (ref 32–75)
NEUTS SEG NFR BLD: 77 % (ref 32–75)
NEUTS SEG NFR BLD: 78 % (ref 32–75)
NEUTS SEG NFR BLD: 85 % (ref 32–75)
NITRITE UR QL STRIP.AUTO: NEGATIVE
NITRITE UR QL STRIP.AUTO: NEGATIVE
NRBC # BLD: 0 K/UL (ref 0–0.01)
NRBC # BLD: 0.06 K/UL (ref 0–0.01)
NRBC BLD-RTO: 0 PER 100 WBC
NRBC BLD-RTO: 0.4 PER 100 WBC
OPIATES UR QL: NEGATIVE
P-R INTERVAL, ECG05: 262 MS
PCP UR QL: NEGATIVE
PH UR STRIP: 5.5 [PH] (ref 5–8)
PH UR STRIP: 6 [PH] (ref 5–8)
PLATELET # BLD AUTO: 209 K/UL (ref 150–400)
PLATELET # BLD AUTO: 232 K/UL (ref 150–400)
PLATELET # BLD AUTO: 237 K/UL (ref 150–400)
PLATELET # BLD AUTO: 245 K/UL (ref 150–400)
PLATELET # BLD AUTO: 267 K/UL (ref 150–400)
PLATELET COMMENTS,PCOM: NORMAL
PMV BLD AUTO: 10.8 FL (ref 8.9–12.9)
PMV BLD AUTO: 10.9 FL (ref 8.9–12.9)
PMV BLD AUTO: 11.6 FL (ref 8.9–12.9)
PMV BLD AUTO: 12.1 FL (ref 8.9–12.9)
PMV BLD AUTO: 12.4 FL (ref 8.9–12.9)
POTASSIUM SERPL-SCNC: 3.3 MMOL/L (ref 3.5–5.1)
POTASSIUM SERPL-SCNC: 3.8 MMOL/L (ref 3.5–5.1)
POTASSIUM SERPL-SCNC: 4.5 MMOL/L (ref 3.5–5.1)
PROCALCITONIN SERPL-MCNC: <0.05 NG/ML
PROT SERPL-MCNC: 6 G/DL (ref 6.4–8.2)
PROT SERPL-MCNC: 6.4 G/DL (ref 6.4–8.2)
PROT SERPL-MCNC: 6.8 G/DL (ref 6.4–8.2)
PROT UR STRIP-MCNC: 100 MG/DL
PROT UR STRIP-MCNC: 30 MG/DL
Q-T INTERVAL, ECG07: 380 MS
QRS DURATION, ECG06: 84 MS
QTC CALCULATION (BEZET), ECG08: 472 MS
RBC # BLD AUTO: 3.71 M/UL (ref 3.8–5.2)
RBC # BLD AUTO: 4.04 M/UL (ref 3.8–5.2)
RBC # BLD AUTO: 4.13 M/UL (ref 3.8–5.2)
RBC # BLD AUTO: 4.19 M/UL (ref 3.8–5.2)
RBC # BLD AUTO: 4.38 M/UL (ref 3.8–5.2)
RBC #/AREA URNS HPF: ABNORMAL /HPF (ref 0–5)
RBC MORPH BLD: NORMAL
RIGHT CCA DIST DIAS: 10.3 CM/S
RIGHT CCA DIST SYS: 48.1 CM/S
RIGHT CCA PROX DIAS: 11.6 CM/S
RIGHT CCA PROX SYS: 49.4 CM/S
RIGHT ECA DIAS: 0 CM/S
RIGHT ECA SYS: 41.6 CM/S
RIGHT ICA DIST DIAS: 10.3 CM/S
RIGHT ICA DIST SYS: 52 CM/S
RIGHT ICA MID DIAS: 10.3 CM/S
RIGHT ICA MID SYS: 48.1 CM/S
RIGHT ICA PROX DIAS: 9 CM/S
RIGHT ICA PROX SYS: 32.5 CM/S
RIGHT ICA/CCA SYS: 1.1
RIGHT VERTEBRAL DIAS: 10.3 CM/S
RIGHT VERTEBRAL SYS: 32.5 CM/S
SARS-COV-2, COV2: NOT DETECTED
SERVICE CMNT-IMP: ABNORMAL
SERVICE CMNT-IMP: NORMAL
SODIUM SERPL-SCNC: 138 MMOL/L (ref 136–145)
SODIUM SERPL-SCNC: 139 MMOL/L (ref 136–145)
SODIUM SERPL-SCNC: 143 MMOL/L (ref 136–145)
SODIUM SERPL-SCNC: 144 MMOL/L (ref 136–145)
SODIUM SERPL-SCNC: 144 MMOL/L (ref 136–145)
SP GR UR REFRACTOMETRY: 1.02 (ref 1–1.03)
SP GR UR REFRACTOMETRY: 1.02 (ref 1–1.03)
TRIGL SERPL-MCNC: 86 MG/DL (ref ?–150)
TRIGL SERPL-MCNC: 91 MG/DL (ref ?–150)
TROPONIN-HIGH SENSITIVITY: 30 NG/L (ref 0–51)
TSH SERPL DL<=0.05 MIU/L-ACNC: 0.59 UIU/ML (ref 0.36–3.74)
TSH SERPL DL<=0.05 MIU/L-ACNC: 1.47 UIU/ML (ref 0.36–3.74)
UA: UC IF INDICATED,UAUC: ABNORMAL
UROBILINOGEN UR QL STRIP.AUTO: 1 EU/DL (ref 0.2–1)
UROBILINOGEN UR QL STRIP.AUTO: 1 EU/DL (ref 0.2–1)
VAS LEFT SUBCLAVIAN PROX EDV: 0 CM/S
VAS LEFT SUBCLAVIAN PROX PSV: 71 CM/S
VAS RIGHT SUBCLAVIAN PROX EDV: 0 CM/S
VAS RIGHT SUBCLAVIAN PROX PSV: 46.8 CM/S
VENTRICULAR RATE, ECG03: 93 BPM
VLDLC SERPL CALC-MCNC: 17.2 MG/DL
VLDLC SERPL CALC-MCNC: 18.2 MG/DL
WBC # BLD AUTO: 11 K/UL (ref 3.6–11)
WBC # BLD AUTO: 11.2 K/UL (ref 3.6–11)
WBC # BLD AUTO: 14 K/UL (ref 3.6–11)
WBC # BLD AUTO: 6.6 K/UL (ref 3.6–11)
WBC # BLD AUTO: 8.3 K/UL (ref 3.6–11)
WBC URNS QL MICRO: ABNORMAL /HPF (ref 0–4)

## 2021-01-01 PROCEDURE — 74011250636 HC RX REV CODE- 250/636: Performed by: GENERAL ACUTE CARE HOSPITAL

## 2021-01-01 PROCEDURE — 74011250636 HC RX REV CODE- 250/636: Performed by: PSYCHIATRY & NEUROLOGY

## 2021-01-01 PROCEDURE — 74011250636 HC RX REV CODE- 250/636: Performed by: INTERNAL MEDICINE

## 2021-01-01 PROCEDURE — 36415 COLL VENOUS BLD VENIPUNCTURE: CPT

## 2021-01-01 PROCEDURE — 99223 1ST HOSP IP/OBS HIGH 75: CPT | Performed by: PSYCHIATRY & NEUROLOGY

## 2021-01-01 PROCEDURE — 74011000258 HC RX REV CODE- 258: Performed by: INTERNAL MEDICINE

## 2021-01-01 PROCEDURE — 80053 COMPREHEN METABOLIC PANEL: CPT

## 2021-01-01 PROCEDURE — 81001 URINALYSIS AUTO W/SCOPE: CPT

## 2021-01-01 PROCEDURE — G0444 DEPRESSION SCREEN ANNUAL: HCPCS | Performed by: FAMILY MEDICINE

## 2021-01-01 PROCEDURE — 84145 PROCALCITONIN (PCT): CPT

## 2021-01-01 PROCEDURE — 99233 SBSQ HOSP IP/OBS HIGH 50: CPT | Performed by: PSYCHIATRY & NEUROLOGY

## 2021-01-01 PROCEDURE — 82962 GLUCOSE BLOOD TEST: CPT

## 2021-01-01 PROCEDURE — 74011250637 HC RX REV CODE- 250/637: Performed by: GENERAL ACUTE CARE HOSPITAL

## 2021-01-01 PROCEDURE — 81003 URINALYSIS AUTO W/O SCOPE: CPT

## 2021-01-01 PROCEDURE — 99223 1ST HOSP IP/OBS HIGH 75: CPT | Performed by: NURSE PRACTITIONER

## 2021-01-01 PROCEDURE — G8510 SCR DEP NEG, NO PLAN REQD: HCPCS | Performed by: FAMILY MEDICINE

## 2021-01-01 PROCEDURE — 70551 MRI BRAIN STEM W/O DYE: CPT

## 2021-01-01 PROCEDURE — 99233 SBSQ HOSP IP/OBS HIGH 50: CPT | Performed by: NURSE PRACTITIONER

## 2021-01-01 PROCEDURE — G8420 CALC BMI NORM PARAMETERS: HCPCS | Performed by: FAMILY MEDICINE

## 2021-01-01 PROCEDURE — 99212 OFFICE O/P EST SF 10 MIN: CPT | Performed by: FAMILY MEDICINE

## 2021-01-01 PROCEDURE — 80048 BASIC METABOLIC PNL TOTAL CA: CPT

## 2021-01-01 PROCEDURE — 74011000250 HC RX REV CODE- 250: Performed by: GENERAL ACUTE CARE HOSPITAL

## 2021-01-01 PROCEDURE — 65270000029 HC RM PRIVATE

## 2021-01-01 PROCEDURE — 85025 COMPLETE CBC W/AUTO DIFF WBC: CPT

## 2021-01-01 PROCEDURE — 82077 ASSAY SPEC XCP UR&BREATH IA: CPT

## 2021-01-01 PROCEDURE — 99285 EMERGENCY DEPT VISIT HI MDM: CPT

## 2021-01-01 PROCEDURE — 74011250637 HC RX REV CODE- 250/637: Performed by: NURSE PRACTITIONER

## 2021-01-01 PROCEDURE — 83605 ASSAY OF LACTIC ACID: CPT

## 2021-01-01 PROCEDURE — G8536 NO DOC ELDER MAL SCRN: HCPCS | Performed by: FAMILY MEDICINE

## 2021-01-01 PROCEDURE — 71045 X-RAY EXAM CHEST 1 VIEW: CPT

## 2021-01-01 PROCEDURE — 82550 ASSAY OF CK (CPK): CPT

## 2021-01-01 PROCEDURE — 65660000000 HC RM CCU STEPDOWN

## 2021-01-01 PROCEDURE — 96365 THER/PROPH/DIAG IV INF INIT: CPT

## 2021-01-01 PROCEDURE — 80061 LIPID PANEL: CPT

## 2021-01-01 PROCEDURE — 84443 ASSAY THYROID STIM HORMONE: CPT

## 2021-01-01 PROCEDURE — 93880 EXTRACRANIAL BILAT STUDY: CPT | Performed by: PSYCHIATRY & NEUROLOGY

## 2021-01-01 PROCEDURE — 74011000250 HC RX REV CODE- 250: Performed by: EMERGENCY MEDICINE

## 2021-01-01 PROCEDURE — 83735 ASSAY OF MAGNESIUM: CPT

## 2021-01-01 PROCEDURE — 1090F PRES/ABSN URINE INCON ASSESS: CPT | Performed by: FAMILY MEDICINE

## 2021-01-01 PROCEDURE — 74011250636 HC RX REV CODE- 250/636: Performed by: FAMILY MEDICINE

## 2021-01-01 PROCEDURE — 74011000258 HC RX REV CODE- 258: Performed by: PSYCHIATRY & NEUROLOGY

## 2021-01-01 PROCEDURE — 77030019905 HC CATH URETH INTMIT MDII -A

## 2021-01-01 PROCEDURE — 95816 EEG AWAKE AND DROWSY: CPT | Performed by: PSYCHIATRY & NEUROLOGY

## 2021-01-01 PROCEDURE — 99282 EMERGENCY DEPT VISIT SF MDM: CPT

## 2021-01-01 PROCEDURE — 94762 N-INVAS EAR/PLS OXIMTRY CONT: CPT

## 2021-01-01 PROCEDURE — G8427 DOCREV CUR MEDS BY ELIG CLIN: HCPCS | Performed by: FAMILY MEDICINE

## 2021-01-01 PROCEDURE — 83036 HEMOGLOBIN GLYCOSYLATED A1C: CPT

## 2021-01-01 PROCEDURE — 74011000250 HC RX REV CODE- 250: Performed by: NURSE PRACTITIONER

## 2021-01-01 PROCEDURE — 93880 EXTRACRANIAL BILAT STUDY: CPT

## 2021-01-01 PROCEDURE — 84484 ASSAY OF TROPONIN QUANT: CPT

## 2021-01-01 PROCEDURE — 75810000055 HC AVULSION NAIL PLATE SIMPLE

## 2021-01-01 PROCEDURE — 99215 OFFICE O/P EST HI 40 MIN: CPT | Performed by: FAMILY MEDICINE

## 2021-01-01 PROCEDURE — 99214 OFFICE O/P EST MOD 30 MIN: CPT | Performed by: FAMILY MEDICINE

## 2021-01-01 PROCEDURE — 99232 SBSQ HOSP IP/OBS MODERATE 35: CPT | Performed by: PSYCHIATRY & NEUROLOGY

## 2021-01-01 PROCEDURE — 74011250636 HC RX REV CODE- 250/636: Performed by: NURSE PRACTITIONER

## 2021-01-01 PROCEDURE — 87186 SC STD MICRODIL/AGAR DIL: CPT

## 2021-01-01 PROCEDURE — 80307 DRUG TEST PRSMV CHEM ANLYZR: CPT

## 2021-01-01 PROCEDURE — 93005 ELECTROCARDIOGRAM TRACING: CPT

## 2021-01-01 PROCEDURE — G0439 PPPS, SUBSEQ VISIT: HCPCS | Performed by: FAMILY MEDICINE

## 2021-01-01 PROCEDURE — 70450 CT HEAD/BRAIN W/O DYE: CPT

## 2021-01-01 PROCEDURE — 87077 CULTURE AEROBIC IDENTIFY: CPT

## 2021-01-01 PROCEDURE — 87086 URINE CULTURE/COLONY COUNT: CPT

## 2021-01-01 PROCEDURE — 1101F PT FALLS ASSESS-DOCD LE1/YR: CPT | Performed by: FAMILY MEDICINE

## 2021-01-01 PROCEDURE — 87636 SARSCOV2 & INF A&B AMP PRB: CPT

## 2021-01-01 PROCEDURE — 74011000258 HC RX REV CODE- 258: Performed by: FAMILY MEDICINE

## 2021-01-01 PROCEDURE — 95816 EEG AWAKE AND DROWSY: CPT | Performed by: INTERNAL MEDICINE

## 2021-01-01 PROCEDURE — 96375 TX/PRO/DX INJ NEW DRUG ADDON: CPT

## 2021-01-01 RX ORDER — MORPHINE SULFATE 100 MG/5ML
5 SOLUTION ORAL
Status: DISCONTINUED | OUTPATIENT
Start: 2021-01-01 | End: 2021-01-01

## 2021-01-01 RX ORDER — TRIAMCINOLONE ACETONIDE 1 MG/G
1 CREAM TOPICAL AS NEEDED
COMMUNITY
Start: 2021-01-01

## 2021-01-01 RX ORDER — MUPIROCIN 20 MG/G
1 OINTMENT TOPICAL 2 TIMES DAILY
COMMUNITY
Start: 2021-01-01

## 2021-01-01 RX ORDER — ACETAMINOPHEN 650 MG/1
325 SUPPOSITORY RECTAL
Status: DISCONTINUED | OUTPATIENT
Start: 2021-01-01 | End: 2021-10-29 | Stop reason: HOSPADM

## 2021-01-01 RX ORDER — METHIMAZOLE 5 MG/1
TABLET ORAL
Qty: 52 TABLET | Refills: 3 | Status: SHIPPED | OUTPATIENT
Start: 2021-01-01

## 2021-01-01 RX ORDER — GLYCOPYRROLATE 0.2 MG/ML
0.1 INJECTION INTRAMUSCULAR; INTRAVENOUS 3 TIMES DAILY
Status: DISPENSED | OUTPATIENT
Start: 2021-01-01 | End: 2021-01-01

## 2021-01-01 RX ORDER — SODIUM CHLORIDE 0.9 % (FLUSH) 0.9 %
5-40 SYRINGE (ML) INJECTION AS NEEDED
Status: DISCONTINUED | OUTPATIENT
Start: 2021-01-01 | End: 2021-01-01

## 2021-01-01 RX ORDER — HEPARIN SODIUM 5000 [USP'U]/ML
5000 INJECTION, SOLUTION INTRAVENOUS; SUBCUTANEOUS EVERY 12 HOURS
Status: DISCONTINUED | OUTPATIENT
Start: 2021-01-01 | End: 2021-01-01

## 2021-01-01 RX ORDER — HYDRALAZINE HYDROCHLORIDE 20 MG/ML
10 INJECTION INTRAMUSCULAR; INTRAVENOUS
Status: DISCONTINUED | OUTPATIENT
Start: 2021-01-01 | End: 2021-01-01

## 2021-01-01 RX ORDER — GLYCOPYRROLATE 0.2 MG/ML
0.1 INJECTION INTRAMUSCULAR; INTRAVENOUS ONCE
Status: CANCELLED | OUTPATIENT
Start: 2021-01-01 | End: 2021-01-01

## 2021-01-01 RX ORDER — SCOLOPAMINE TRANSDERMAL SYSTEM 1 MG/1
1 PATCH, EXTENDED RELEASE TRANSDERMAL
Status: DISCONTINUED | OUTPATIENT
Start: 2021-01-01 | End: 2021-10-29 | Stop reason: HOSPADM

## 2021-01-01 RX ORDER — LIDOCAINE HYDROCHLORIDE 10 MG/ML
10 INJECTION, SOLUTION EPIDURAL; INFILTRATION; INTRACAUDAL; PERINEURAL ONCE
Status: DISCONTINUED | OUTPATIENT
Start: 2021-01-01 | End: 2021-01-01

## 2021-01-01 RX ORDER — ATORVASTATIN CALCIUM 40 MG/1
40 TABLET, FILM COATED ORAL
Status: DISCONTINUED | OUTPATIENT
Start: 2021-01-01 | End: 2021-01-01

## 2021-01-01 RX ORDER — LORAZEPAM 2 MG/ML
0.5 INJECTION INTRAMUSCULAR
Status: DISCONTINUED | OUTPATIENT
Start: 2021-01-01 | End: 2021-10-29 | Stop reason: HOSPADM

## 2021-01-01 RX ORDER — MORPHINE SULFATE 2 MG/ML
2 INJECTION, SOLUTION INTRAMUSCULAR; INTRAVENOUS
Status: DISCONTINUED | OUTPATIENT
Start: 2021-01-01 | End: 2021-01-01

## 2021-01-01 RX ORDER — LORAZEPAM 2 MG/ML
2 INJECTION INTRAMUSCULAR
Status: DISCONTINUED | OUTPATIENT
Start: 2021-01-01 | End: 2021-10-29 | Stop reason: HOSPADM

## 2021-01-01 RX ORDER — LORAZEPAM 2 MG/ML
2 INJECTION INTRAMUSCULAR
Status: DISCONTINUED | OUTPATIENT
Start: 2021-01-01 | End: 2021-01-01

## 2021-01-01 RX ORDER — LORAZEPAM 2 MG/ML
2 CONCENTRATE ORAL
Status: DISCONTINUED | OUTPATIENT
Start: 2021-01-01 | End: 2021-01-01

## 2021-01-01 RX ORDER — METHIMAZOLE 5 MG/1
5 TABLET ORAL DAILY
Status: DISCONTINUED | OUTPATIENT
Start: 2021-01-01 | End: 2021-01-01

## 2021-01-01 RX ORDER — ENOXAPARIN SODIUM 100 MG/ML
40 INJECTION SUBCUTANEOUS EVERY 24 HOURS
Status: DISCONTINUED | OUTPATIENT
Start: 2021-01-01 | End: 2021-01-01

## 2021-01-01 RX ORDER — SODIUM CHLORIDE 0.9 % (FLUSH) 0.9 %
5-40 SYRINGE (ML) INJECTION EVERY 8 HOURS
Status: DISCONTINUED | OUTPATIENT
Start: 2021-01-01 | End: 2021-01-01

## 2021-01-01 RX ORDER — MORPHINE SULFATE 100 MG/5ML
5 SOLUTION ORAL
Status: DISCONTINUED | OUTPATIENT
Start: 2021-01-01 | End: 2021-01-01 | Stop reason: SDUPTHER

## 2021-01-01 RX ORDER — LIDOCAINE HYDROCHLORIDE 10 MG/ML
10 INJECTION INFILTRATION; PERINEURAL ONCE
Status: DISCONTINUED | OUTPATIENT
Start: 2021-01-01 | End: 2021-01-01 | Stop reason: HOSPADM

## 2021-01-01 RX ORDER — CLOPIDOGREL BISULFATE 75 MG/1
75 TABLET ORAL DAILY
Qty: 90 TABLET | Refills: 3 | Status: SHIPPED | OUTPATIENT
Start: 2021-01-01

## 2021-01-01 RX ORDER — LORAZEPAM 2 MG/ML
0.25 INJECTION INTRAMUSCULAR
Status: COMPLETED | OUTPATIENT
Start: 2021-01-01 | End: 2021-01-01

## 2021-01-01 RX ORDER — PANTOPRAZOLE SODIUM 40 MG/1
TABLET, DELAYED RELEASE ORAL
Qty: 90 TABLET | Refills: 3 | Status: SHIPPED | OUTPATIENT
Start: 2021-01-01 | End: 2021-01-01

## 2021-01-01 RX ORDER — SODIUM CHLORIDE 0.9 % (FLUSH) 0.9 %
5-10 SYRINGE (ML) INJECTION ONCE
Status: COMPLETED | OUTPATIENT
Start: 2021-01-01 | End: 2021-01-01

## 2021-01-01 RX ORDER — METOPROLOL TARTRATE 5 MG/5ML
2.5 INJECTION INTRAVENOUS EVERY 6 HOURS
Status: DISCONTINUED | OUTPATIENT
Start: 2021-01-01 | End: 2021-01-01

## 2021-01-01 RX ORDER — DEXTROSE, SODIUM CHLORIDE, AND POTASSIUM CHLORIDE 5; .9; .15 G/100ML; G/100ML; G/100ML
50 INJECTION INTRAVENOUS CONTINUOUS
Status: DISCONTINUED | OUTPATIENT
Start: 2021-01-01 | End: 2021-01-01

## 2021-01-01 RX ORDER — CLOPIDOGREL BISULFATE 75 MG/1
75 TABLET ORAL DAILY
Status: DISCONTINUED | OUTPATIENT
Start: 2021-01-01 | End: 2021-01-01

## 2021-01-01 RX ORDER — MORPHINE SULFATE 2 MG/ML
2 INJECTION, SOLUTION INTRAMUSCULAR; INTRAVENOUS
Status: DISCONTINUED | OUTPATIENT
Start: 2021-01-01 | End: 2021-10-29 | Stop reason: HOSPADM

## 2021-01-01 RX ORDER — SODIUM CHLORIDE 9 MG/ML
125 INJECTION, SOLUTION INTRAVENOUS CONTINUOUS
Status: DISCONTINUED | OUTPATIENT
Start: 2021-01-01 | End: 2021-01-01 | Stop reason: HOSPADM

## 2021-01-01 RX ADMIN — METOPROLOL TARTRATE 2.5 MG: 1 INJECTION, SOLUTION INTRAVENOUS at 06:19

## 2021-01-01 RX ADMIN — Medication 10 ML: at 16:28

## 2021-01-01 RX ADMIN — HYDRALAZINE HYDROCHLORIDE 10 MG: 20 INJECTION INTRAMUSCULAR; INTRAVENOUS at 17:01

## 2021-01-01 RX ADMIN — POTASSIUM CHLORIDE, DEXTROSE MONOHYDRATE AND SODIUM CHLORIDE 50 ML/HR: 150; 5; 900 INJECTION, SOLUTION INTRAVENOUS at 23:05

## 2021-01-01 RX ADMIN — Medication 10 ML: at 21:30

## 2021-01-01 RX ADMIN — MORPHINE SULFATE 2 MG: 2 INJECTION, SOLUTION INTRAMUSCULAR; INTRAVENOUS at 12:00

## 2021-01-01 RX ADMIN — LEVETIRACETAM 1500 MG: 100 INJECTION, SOLUTION INTRAVENOUS at 22:34

## 2021-01-01 RX ADMIN — METOPROLOL TARTRATE 2.5 MG: 1 INJECTION, SOLUTION INTRAVENOUS at 17:02

## 2021-01-01 RX ADMIN — SODIUM CHLORIDE 1000 ML: 9 INJECTION, SOLUTION INTRAVENOUS at 21:00

## 2021-01-01 RX ADMIN — LORAZEPAM 0.26 MG: 2 INJECTION INTRAMUSCULAR; INTRAVENOUS at 01:42

## 2021-01-01 RX ADMIN — ACETAMINOPHEN 325 MG: 650 SUPPOSITORY RECTAL at 17:00

## 2021-01-01 RX ADMIN — LEVETIRACETAM 500 MG: 100 INJECTION, SOLUTION INTRAVENOUS at 10:31

## 2021-01-01 RX ADMIN — LEVETIRACETAM 1500 MG: 100 INJECTION, SOLUTION INTRAVENOUS at 21:42

## 2021-01-01 RX ADMIN — METOPROLOL TARTRATE 2.5 MG: 1 INJECTION, SOLUTION INTRAVENOUS at 12:15

## 2021-01-01 RX ADMIN — METOPROLOL TARTRATE 2.5 MG: 1 INJECTION, SOLUTION INTRAVENOUS at 00:11

## 2021-01-01 RX ADMIN — METOPROLOL TARTRATE 2.5 MG: 1 INJECTION, SOLUTION INTRAVENOUS at 17:59

## 2021-01-01 RX ADMIN — Medication 10 ML: at 14:15

## 2021-01-01 RX ADMIN — MORPHINE SULFATE 5 MG: 10 SOLUTION ORAL at 13:38

## 2021-01-01 RX ADMIN — HEPARIN SODIUM 5000 UNITS: 5000 INJECTION INTRAVENOUS; SUBCUTANEOUS at 09:11

## 2021-01-01 RX ADMIN — LEVETIRACETAM 1000 MG: 100 INJECTION, SOLUTION INTRAVENOUS at 09:31

## 2021-01-01 RX ADMIN — MORPHINE SULFATE 5 MG: 10 SOLUTION ORAL at 20:22

## 2021-01-01 RX ADMIN — HEPARIN SODIUM 5000 UNITS: 5000 INJECTION INTRAVENOUS; SUBCUTANEOUS at 21:57

## 2021-01-01 RX ADMIN — Medication 10 ML: at 22:09

## 2021-01-01 RX ADMIN — Medication 10 ML: at 18:58

## 2021-01-01 RX ADMIN — LORAZEPAM 2 MG: 2 INJECTION INTRAMUSCULAR; INTRAVENOUS at 22:04

## 2021-01-01 RX ADMIN — MORPHINE SULFATE 2 MG: 2 INJECTION, SOLUTION INTRAMUSCULAR; INTRAVENOUS at 12:24

## 2021-01-01 RX ADMIN — Medication 10 ML: at 06:23

## 2021-01-01 RX ADMIN — LEVETIRACETAM 1500 MG: 100 INJECTION, SOLUTION INTRAVENOUS at 21:26

## 2021-01-01 RX ADMIN — GLYCOPYRROLATE 0.1 MG: 0.2 INJECTION, SOLUTION INTRAMUSCULAR; INTRAVENOUS at 17:21

## 2021-01-01 RX ADMIN — POTASSIUM CHLORIDE, DEXTROSE MONOHYDRATE AND SODIUM CHLORIDE 50 ML/HR: 150; 5; 900 INJECTION, SOLUTION INTRAVENOUS at 23:06

## 2021-01-01 RX ADMIN — POTASSIUM CHLORIDE, DEXTROSE MONOHYDRATE AND SODIUM CHLORIDE 50 ML/HR: 150; 5; 900 INJECTION, SOLUTION INTRAVENOUS at 18:19

## 2021-01-01 RX ADMIN — HYDRALAZINE HYDROCHLORIDE 10 MG: 20 INJECTION INTRAMUSCULAR; INTRAVENOUS at 21:33

## 2021-01-01 RX ADMIN — METOPROLOL TARTRATE 2.5 MG: 1 INJECTION, SOLUTION INTRAVENOUS at 11:24

## 2021-01-01 RX ADMIN — LORAZEPAM 2 MG: 2 INJECTION INTRAMUSCULAR; INTRAVENOUS at 02:51

## 2021-01-01 RX ADMIN — HEPARIN SODIUM 5000 UNITS: 5000 INJECTION INTRAVENOUS; SUBCUTANEOUS at 22:08

## 2021-01-01 RX ADMIN — METOPROLOL TARTRATE 2.5 MG: 1 INJECTION, SOLUTION INTRAVENOUS at 00:41

## 2021-01-01 RX ADMIN — LEVETIRACETAM 1500 MG: 100 INJECTION, SOLUTION INTRAVENOUS at 10:01

## 2021-01-01 RX ADMIN — LORAZEPAM 2 MG: 2 INJECTION INTRAMUSCULAR; INTRAVENOUS at 12:00

## 2021-01-01 RX ADMIN — LEVETIRACETAM 1500 MG: 100 INJECTION, SOLUTION INTRAVENOUS at 21:56

## 2021-01-01 RX ADMIN — METOPROLOL TARTRATE 2.5 MG: 1 INJECTION, SOLUTION INTRAVENOUS at 18:17

## 2021-01-01 RX ADMIN — Medication 10 ML: at 17:00

## 2021-01-01 RX ADMIN — METOPROLOL TARTRATE 2.5 MG: 1 INJECTION, SOLUTION INTRAVENOUS at 23:04

## 2021-01-01 RX ADMIN — Medication 10 ML: at 05:09

## 2021-01-01 RX ADMIN — HEPARIN SODIUM 5000 UNITS: 5000 INJECTION INTRAVENOUS; SUBCUTANEOUS at 10:58

## 2021-01-01 RX ADMIN — LIDOCAINE HYDROCHLORIDE 10 ML: 10 INJECTION, SOLUTION EPIDURAL; INFILTRATION; INTRACAUDAL; PERINEURAL at 16:39

## 2021-01-01 RX ADMIN — METOPROLOL TARTRATE 2.5 MG: 1 INJECTION, SOLUTION INTRAVENOUS at 11:38

## 2021-01-01 RX ADMIN — GLYCOPYRROLATE 0.1 MG: 0.2 INJECTION, SOLUTION INTRAMUSCULAR; INTRAVENOUS at 23:50

## 2021-01-01 RX ADMIN — METOPROLOL TARTRATE 2.5 MG: 1 INJECTION, SOLUTION INTRAVENOUS at 06:28

## 2021-01-01 RX ADMIN — POTASSIUM CHLORIDE, DEXTROSE MONOHYDRATE AND SODIUM CHLORIDE 100 ML/HR: 150; 5; 900 INJECTION, SOLUTION INTRAVENOUS at 09:03

## 2021-01-01 RX ADMIN — HEPARIN SODIUM 5000 UNITS: 5000 INJECTION INTRAVENOUS; SUBCUTANEOUS at 10:31

## 2021-01-01 RX ADMIN — LEVETIRACETAM 1500 MG: 100 INJECTION, SOLUTION INTRAVENOUS at 09:00

## 2021-01-01 RX ADMIN — LORAZEPAM 2 MG: 2 SOLUTION, CONCENTRATE ORAL at 23:23

## 2021-01-01 RX ADMIN — HEPARIN SODIUM 5000 UNITS: 5000 INJECTION INTRAVENOUS; SUBCUTANEOUS at 11:24

## 2021-01-01 RX ADMIN — METOPROLOL TARTRATE 2.5 MG: 1 INJECTION, SOLUTION INTRAVENOUS at 18:56

## 2021-01-01 RX ADMIN — LEVETIRACETAM 1000 MG: 100 INJECTION, SOLUTION INTRAVENOUS at 22:00

## 2021-01-01 RX ADMIN — SODIUM CHLORIDE 125 ML/HR: 9 INJECTION, SOLUTION INTRAVENOUS at 22:00

## 2021-01-01 RX ADMIN — MORPHINE SULFATE 5 MG: 10 SOLUTION ORAL at 23:25

## 2021-01-01 RX ADMIN — Medication 10 ML: at 21:19

## 2021-01-01 RX ADMIN — Medication 10 ML: at 22:05

## 2021-01-01 RX ADMIN — HEPARIN SODIUM 5000 UNITS: 5000 INJECTION INTRAVENOUS; SUBCUTANEOUS at 08:55

## 2021-01-01 RX ADMIN — LEVETIRACETAM 1500 MG: 100 INJECTION, SOLUTION INTRAVENOUS at 09:11

## 2021-01-01 RX ADMIN — Medication 10 ML: at 01:43

## 2021-01-01 RX ADMIN — LEVETIRACETAM 1500 MG: 100 INJECTION, SOLUTION INTRAVENOUS at 11:24

## 2021-01-01 RX ADMIN — HEPARIN SODIUM 5000 UNITS: 5000 INJECTION INTRAVENOUS; SUBCUTANEOUS at 21:19

## 2021-01-01 RX ADMIN — METOPROLOL TARTRATE 2.5 MG: 1 INJECTION, SOLUTION INTRAVENOUS at 05:09

## 2021-01-01 RX ADMIN — HEPARIN SODIUM 5000 UNITS: 5000 INJECTION INTRAVENOUS; SUBCUTANEOUS at 21:26

## 2021-01-01 RX ADMIN — MORPHINE SULFATE 2 MG: 2 INJECTION, SOLUTION INTRAMUSCULAR; INTRAVENOUS at 10:14

## 2021-01-01 RX ADMIN — HYDRALAZINE HYDROCHLORIDE 10 MG: 20 INJECTION INTRAMUSCULAR; INTRAVENOUS at 16:26

## 2021-01-01 RX ADMIN — Medication 10 ML: at 06:27

## 2021-01-01 RX ADMIN — METOPROLOL TARTRATE 2.5 MG: 1 INJECTION, SOLUTION INTRAVENOUS at 23:05

## 2021-03-15 NOTE — PROGRESS NOTES
Chief Complaint Patient presents with  Suture Removal  
 
1. Have you been to the ER, urgent care clinic since your last visit? Hospitalized since your last visit? No 
 
2. Have you seen or consulted any other health care providers outside of the [address] [place] [address] since your last visit? Include any pap smears or colon screening. Yes When: Dr. Sushil Kan [phone] Leanne Og is a 80 y.o. female HPI: 
Symptoms include suture removal. Had punch biopsy L flank with DR. Campuzano about 10d ago. Healing well. Due for suture removal today. PMH, SH, Medications/Allergies: reviewed, on chart. Current Outpatient Medications Medication Sig  
 ondansetron (ZOFRAN ODT) 4 mg disintegrating tablet Take 1 Tab by mouth every eight (8) hours as needed for Nausea or Vomiting.  DILT- mg capsule Take 1 Cap by mouth daily.  bisoprolol (ZEBETA) 10 mg tablet Take 1 Tab by mouth two (2) times a day. Indications: pressure and thyroid  furosemide (LASIX) 20 mg tablet 1 daily for 1 week for swollen ankles, then 1 daily as needed for swelling  Indications: fluid  lisinopriL (PRINIVIL, ZESTRIL) 20 mg tablet Take 1 Tab by mouth two (2) times a day. Indications: pressure  atorvastatin (LIPITOR) 40 mg tablet TAKE 1 TABLET BY MOUTH DAILY for cholesterol and stroke prevention  gabapentin (NEURONTIN) 100 mg capsule TAKE 2 CAPSULES BY MOUTH EVERY NIGHT FOR NERVES  
 methIMAzole (TAPAZOLE) 5 mg tablet TAKE 1 TABLET BY MOUTH ON MONDAY, WEDNESDAY, FRIDAY AND SATURDAY FOR THYROID  pantoprazole (PROTONIX) 40 mg tablet TAKE 1 TABLET BY MOUTH DAILY  clopidogreL (PLAVIX) 75 mg tab Take 1 Tab by mouth daily. Indications: prevent strokes  scopolamine (TRANSDERM-SCOP) 1 mg over 3 days pt3d 1 Patch by TransDERmal route every seventy-two (72) hours. Or nausea  Indications: motion sickness  dicyclomine (BENTYL) 20 mg tablet Take 1 Tab by mouth every six (6) hours as needed (abdominal cramps) for up to 20 doses.  
• denosumab (PROLIA) 60 mg/mL injection 1 mL by SubCUTAneous route every 6 months. Indications: brittle bones  
• polyethylene glycol (MIRALAX) 17 gram/dose powder Take 17 g by mouth every Monday, Wednesday, Friday.  
• cyanocobalamin (VITAMIN B-12) 1,000 mcg tablet Take 1,000 mcg by mouth daily.  
• mupirocin (BACTROBAN) 2 % ointment Apply 1 Applicator to affected area two (2) times a day.  
• triamcinolone acetonide (KENALOG) 0.1 % topical cream Apply 1 Applicator to affected area as needed.  
 
No current facility-administered medications for this visit.   
  
ROS: 
Constitutional: No fever, chills or abnormal weight loss 
Respiratory: No cough, SOB  
CV: No chest pain or Palpitations 
 
Visit Vitals 
/80 (BP 1 Location: Right upper arm, BP Patient Position: Sitting, BP Cuff Size: Adult)  
Pulse 87  
Temp 97.3 °F (36.3 °C) (Oral)  
Ht 5' 5\" (1.651 m)  
Wt 130 lb (59 kg)  
SpO2 98%  
BMI 21.63 kg/m²  
 
Wt Readings from Last 3 Encounters:  
03/15/21 130 lb (59 kg)  
12/15/20 128 lb (58.1 kg)  
11/30/20 128 lb 6.4 oz (58.2 kg)  
 
BP Readings from Last 3 Encounters:  
03/15/21 130/80  
12/15/20 (!) 142/84  
11/30/20 110/70  
 
Physical Examination: General appearance - alert, well appearing, and in no distress 
Mental status - alert, oriented to person, place, and time 
Eyes - pupils equal and reactive, extraocular eye movements intact 
ENT - bilateral external ears and nose normal. Normal lips 
Skin- L low back with healed biopsy site with 2x 3-0 Nylon simple interrupted sutures in place. 
 
A/P: 
Punch biopsy flank with suture removal 
Wound healing well. Sutures out today. Get notes + bx report from Derm. 
 
Osteoporosis 
Pt wishes to pause the prolia for now, plan discuss at fall follow-up. 
 
 
F/U 2-3mo.

## 2021-03-26 NOTE — ED PROVIDER NOTES
[facility] 
EMERGENCY DEPARTMENT HISTORY AND PHYSICAL EXAM  
 
 
 
Date of Service: 3/26/2021 Patient Name: Kathy Gonzalez YOB: 1933 Medical Record Number: [id number] History of Presenting Illness Chief Complaint Patient presents with  Toe Injury History Provided By:  patient Additional History:  
Leanne Shannon is a 80 y.o. female who presents ambulatory to the ED with cc of partially avulsed nail on left great toe. Pt states it had been coming up at the tip; she must have caught it on a sock, as there was no trauma. The base of the nail remains attached to the growth matrix, but the nail is angled upward 90 degrees from the bed. There is minimal bleeding from the nailbed. No other injury. There are no other complaints, changes or physical findings at this time. Primary Care Provider: Rony Meeks MD  
Specialist: 
 
Past History Past Medical History:  
Past Medical History:  
Diagnosis Date  Breast cancer ([address]  Constipation  First degree AV block  GERD (gastroesophageal reflux disease)  HTN (hypertension)  Hypercalcemia 10/26/2015  Hyperparathyroidism ([address]  Hypertension  Hypopotassemia  IGT (impaired glucose tolerance)  Neuropathy, diabetic ([address]  Osteoporosis 2008 Elbow Fx  
 Other and unspecified hyperlipidemia  Pneumonia 2006 RLL  
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  
Past Surgical History:  
Procedure Laterality Date  HX APPENDECTOMY  HX BREAST LUMPECTOMY  HX BREAST LUMPECTOMY Left 1998  HX BREAST LUMPECTOMY Right 1999  HX CATARACT REMOVAL Bilateral   
 HX HEENT    
 nose surgery after skin cancer removed  HX HEENT    
 YAG both eyes [address]  HX MASTECTOMY  2002  HX MASTECTOMY Left   
 HX OTHER SURGICAL    
 had left lower rib removed to extract a kidney stone  HX TUBAL LIGATION Family History:  
Family History Problem Relation Age of Onset  Cancer Mother   
     lymphoma  Heart Disease Father  Heart Disease Maternal Grandmother  Diabetes Maternal Grandfather  Heart Disease Paternal Grandmother  Colon Cancer Maternal Aunt  Other Daughter Overdose  Thyroid Disease Neg Hx Social History:  
Social History Tobacco Use  Smoking status: Never Smoker  Smokeless tobacco: Never Used Substance Use Topics  Alcohol use: No  
  Alcohol/week: 0.0 standard drinks Frequency: Never Binge frequency: Never Comment: on the holidays may have a mixed  Drug use: No  
  
 
Allergies: Allergies Allergen Reactions  Latex Rash  Contrast Agent [Iodine] Unknown (comments) and Hives  Doxycycline Unknown (comments) and Hives  Erythromycin Unknown (comments) and Hives  Macrodantin [Nitrofurantoin Macrocrystalline] Unknown (comments)  Nitrofurantoin Hives  Sulfa (Sulfonamide Antibiotics) Hives Eyes turned red  Sulfasalazine Hives Eyes turned red  Tamoxifen Other (comments) and Rash Review of Systems Review of Systems Constitutional: Negative for appetite change, chills and fever. HENT: Negative for congestion. Eyes: Negative for visual disturbance. Respiratory: Negative for cough, shortness of breath and wheezing. Cardiovascular: Negative for chest pain, palpitations and leg swelling. Gastrointestinal: Negative for abdominal pain. Genitourinary: Negative for dysuria, frequency and urgency. Musculoskeletal: Negative for back pain, joint swelling, myalgias and neck stiffness. Skin: Positive for wound. Negative for rash. Neurological: Negative for dizziness, syncope, weakness and headaches. Hematological: Negative for adenopathy.   
Psychiatric/Behavioral: Negative for behavioral problems and dysphoric mood. Physical Exam 
Physical Exam 
Vitals signs and nursing note reviewed. Constitutional:   
   General: She is not in acute distress. Appearance: She is well-developed. HENT:  
   Head: Normocephalic and atraumatic. Eyes:  
   General: No scleral icterus. Conjunctiva/sclera: Conjunctivae normal.  
   Pupils: Pupils are equal, round, and reactive to light. Neck: Musculoskeletal: Normal range of motion and neck supple. Cardiovascular:  
   Rate and Rhythm: Normal rate and regular rhythm. Heart sounds: No murmur. No gallop. Pulmonary:  
   Effort: Pulmonary effort is normal. No respiratory distress. Breath sounds: No stridor. No wheezing or rales. Abdominal:  
   General: Bowel sounds are normal. There is no distension. Palpations: Abdomen is soft. There is no mass. Tenderness: There is no abdominal tenderness. There is no guarding or rebound. Musculoskeletal: Normal range of motion. Lymphadenopathy:  
   Cervical: No cervical adenopathy. Skin: 
   General: Skin is warm and dry. Findings: No erythema or rash. Comments: Nearly completely avulsed nail left great toe, attached only at the base. Minimal bleeding. Neurological:  
   Mental Status: She is alert and oriented to person, place, and time. Cranial Nerves: No cranial nerve deficit. Coordination: Coordination normal.  
 
 
 
Medical Decision Making I am the first provider for this patient. I reviewed the vital signs, available nursing notes, past medical history, past surgical history, family history and social history. Old Medical Records: PCP notes ED Course: 
4:39 PM  
Initial assessment performed. The patients presenting problems have been discussed, and they are in agreement with the care plan formulated and outlined with them. I have encouraged them to ask questions as they arise throughout their visit.  
 
Progress Notes: 5:00 PM  
Nail removed, skin debrided. Will D/C with PCP F/UIvonne Reid MD 
 
 
Procedures: REMOVAL OF NAIL PLATE Date/Time: 3/26/2021 4:48 PM 
Performed by: Neela Osorio MD 
Authorized by: Neela Osorio MD  
 
Consent:  
  Consent obtained:  Verbal 
  Consent given by:  Patient Risks discussed:  Bleeding, pain and permanent nail deformity Location:  
  Foot:  L big toe Pre-procedure details:  
  Skin preparation:  Betadine Anesthesia (see MAR for exact dosages): Anesthesia method:  Nerve block Block anesthetic:  Lidocaine 1% w/o epi Block technique:  Digital 
Nail Removal:  
  Nail removed:  Complete Nail bed repaired: no   
  Removed nail replaced and anchored: no   
Post-procedure details:  
  Dressing:  4x4 sterile gauze Patient tolerance of procedure: Tolerated well, no immediate complications Diagnostic Study Results Labs - No results found for this or any previous visit (from the past 12 hour(s)). Radiologic Studies - The following have been ordered and reviewed: No orders to display CT Results  (Last 48 hours) None CXR Results  (Last 48 hours) None Vital Signs-Reviewed the patient's vital signs. Patient Vitals for the past 12 hrs: 
 Temp Pulse Resp BP SpO2  
03/26/21 1622 98.3 °F (36.8 °C) 85 16 (!) 192/101 100 % Medications Given in the ED: 
Medications  
lidocaine (XYLOCAINE) 10 mg/mL (1 %) injection 10 mL (10 mL SubCUTAneous Canceled Entry 3/26/21 1642) Diagnosis: 
Clinical Impression: 1. Nail avulsion of toe, initial encounter Plan: 
1:  
Follow-up Information Follow up With Specialties Details Why Contact Info Vanessa Hong MD Family Medicine In 3 days  [address] [facility],[address] [address] [phone] 
  
  
 
 
2:  
Current Discharge Medication List  
  
 
Return to ED if worse. Disposition: 
Home 
_______________________________ Attestations:   
This note was performed by Nate Avina MD in its entirety. _______________________________

## 2021-04-05 NOTE — LETTER
2021 4:12 PM    Jocleynn Farmer  [address] [place] [address], [address]    Re: Ms. Bryan Maffucci  [facility] [id number]      To Whom It May Concern: This letter is concerning mental competency for Leanne A Glo Meng, : 1933, followed at  [facility]. Due to medical problems, this patient may have a mental impairment that is affecting her judgement. She is undergoing further evaluation. Please contact the office if you need further information or have any questions.         Sincerely,      Bay Haywood MD

## 2021-04-05 NOTE — TELEPHONE ENCOUNTER
----- Message from Bennett Ann sent at 4/5/2021 12:10 PM EDT -----  Regarding: Aric Briggs MD/ telephone  Caller's first and last name: Joy Vázquez- Daughter  Reason for call: concerns about mother (pt)Callback required yes/no and why: yes to discuss concernsBest contact number(s):[phone]  Details to clarify the request: Harris Moya wouldn't give me any other information, other than she would like MD to give her a call today. I did explain a call back is 24 business hours, so it may not be today. She was fine with that, just wants a call soon as possible.   Thank you

## 2021-04-05 NOTE — TELEPHONE ENCOUNTER
PT with more financial issues lately, and her POA is concerned that some credit cards have been opened under pt's name, which pt doesn't recall. POA needing more help with legal/financial issues. Will discuss competency with DR. Gabriele Paris, pt's neurologist, and see if they can recommend a second provider for evaluation for competency.

## 2021-04-08 NOTE — TELEPHONE ENCOUNTER
----- Message from Shruthikurtis Del Cidpenny sent at 4/8/2021 10:20 AM EDT -----  Regarding: Dr Cristel Mendenhall  General Message/Vendor Calls    Caller's first and last name: Pascual Mclean (Daughter)      Reason for call: Daughter would like to follow up from the conversation regarding pt      Callback required yes/no and why: yes, to follow up from the conversation regarding pt      Best contact number(s): [phone]      Details to clarify the request: Daughter would like to follow up from the conversation regarding pts wellness      Shruthi Toddon

## 2021-04-09 NOTE — TELEPHONE ENCOUNTER
Called  [phone] to contact patient. No Answer, phone just kept ringing.  Will attempt another number

## 2021-04-09 NOTE — TELEPHONE ENCOUNTER
Spoke with patient and she was very tired and stated her grandson Hui Infante was outside. She stated she needed rest and she would call on Monday for Appointment.

## 2021-04-12 NOTE — PROGRESS NOTES
Patient did not show for scheduled Prolia injection. Contacted her by phone and she states it has been put on hold because she did not feel well after last injection. Informed . Agatha Kehr we would not schedule another appointment unless we are notified by Dr. Vasyl Bai.

## 2021-04-16 NOTE — TELEPHONE ENCOUNTER
----- Message from Monie Ngo sent at 4/16/2021 11:58 AM EDT -----  Regarding: /Telephone  Caller's first and last name: [facility]     Reason for call: requesting a physical therapy evaluation     Callback required yes/no and why: yes, to discuss     Best contact number(s): [phone]    Details to clarify the request: N/A

## 2021-04-16 NOTE — TELEPHONE ENCOUNTER
Nursing care in home from Dr Rand York for wound care. Patient and nursing requests home health eval for strengthing and gait training. Verbal order provided.

## 2021-06-03 NOTE — TELEPHONE ENCOUNTER
----- Message from Kelly Ludwig sent at 6/3/2021  1:07 PM EDT -----  Regarding: Steph Oneill MD/ telephone  Caller's first and last name: Hola Quarles  [facility] for [facility] home [facility]  Callback required yes/no and why: noBest contact number(s): [phone] to clarify the request: needs order for pt to have PT at home. Please fax#[phone].   Thank you
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
## 2021-08-20 NOTE — TELEPHONE ENCOUNTER
----- Message from [address] sent at 8/20/2021  9:30 AM EDT -----  Regarding: Dr. Perri Drew telephone  General Message/Vendor Calls    Caller's first and last name: Sarah Torres, daughter      Reason for call: discuss patient      Callback required yes/no and why: yes      Best contact number(s):[phone]      Details to clarify the request: She would like to be contacted by Dr. Julio Waller to discuss patient       [address]
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
## 2021-08-23 NOTE — TELEPHONE ENCOUNTER
Please call Manuel Portillo (daughter)  [phone]    Re: wants to discuss her mother's health issues    Wants to speak to Dr. Cole maza

## 2021-08-24 NOTE — LETTER
2021 12:24 PM    Jessi Neelines    [address] [place], [place], [address]    Re: Ms. Kiera Richmond  [facility] [place] [id number]      To Whom It May Concern: This letter is concerning mental competency for Leanne Pagan, : 1933, followed at  [facility]. As of 2021, due to her dementia, stroke, and brain damage, she is no longer able to handle her personal and financial affairs, and that her condition will not improve. Please contact the office if you need further information or have any questions.         Sincerely,            Sanna Hicks MD

## 2021-08-24 NOTE — TELEPHONE ENCOUNTER
----- Message from Bouchra Armenta sent at 8/24/2021  4:34 PM EDT -----  Regarding: Dr. Osmin Gillespie first and last name: Naif Jang ( Daughter)   Reason for call: Update   Best contact number(s): [phone]  Details to clarify the request: Caller stated that she needs a call back in regards to an update . Pt stated that she needs a call back a soon as possible.
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
## 2021-08-25 NOTE — TELEPHONE ENCOUNTER
Called and spoke with Daughter Mir Torres to see if there was a message or some way I can help her concerning her mother. Daughter is requesting to speak with Dr Kandace Aparicio directly as a follow up to a pervious phone call about patient's dementia. I have advised Mir Torres I would get the message to Dr Kandace Aparicio however he is currently with patients so I was not sure when he would call  She was fine with that.

## 2021-09-07 NOTE — TELEPHONE ENCOUNTER
----- Message from [facility] sent at 9/7/2021 11:56 AM EDT -----  Regarding: /Telephone     Caller's first and last name:Lois-daughter  Reason for call:Lois would like to talk to Angelo Gottlieb about something personal.  Cuca Dumont required yes/no and why:Yes  Best contact number(s):[phone]  Details to clarify the request:n/a
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
## 2021-09-13 NOTE — PROGRESS NOTES
Marc Rodriguez is a 80 y.o. female presenting for/with:    Chief Complaint   Patient presents with    Hypertension       Visit Vitals  BP (!) 146/88 (BP 1 Location: Right arm, BP Patient Position: Sitting, BP Cuff Size: Adult)   Pulse 86   Temp 97.8 °F (36.6 °C) (Temporal)   Resp 18   Ht 5' 7\" (1.702 m)   Wt 118 lb 6.4 oz (53.7 kg)   SpO2 97%   BMI 18.54 kg/m²     Pain Scale: 2/10  Pain Location: Buttocks    1. Have you been to the ER, urgent care clinic since your last visit? Hospitalized since your last visit? NO    2. Have you seen or consulted any other health care providers outside of the [address] [place] [address] since your last visit? Include any pap smears or colon screening. NO    Symptom review:  NO  Fever   NO  Shaking chills  NO  Cough  NO  Body aches  NO  Coughing up blood  NO  Chest congestion  NO  Chest pain  NO  Shortness of breath  NO  Profound Loss of smell/taste  NO  Nausea/Vomiting   NO  Loose stool/Diarrhea  NO  any skin issues    Patient Risk Factors Reviewed as follows:  NO  have you been in Close contact with confirmed COVID19 patient   NO  History of recent travel to affected geographical areas within the past 14 days  NO  COPD  NO  Active Cancer/Leukemia/Lymphoma/Chemotherapy  NO  Oral steroid use  NO  Pregnant  NO  Diabetes Mellitus  NO  Heart disease  NO  Asthma  NO Health care worker at home  NO Health care worker  NO Is there a Pregnant Woman in the home  NO Dialysis pt in the home   NO a large number of people living in the home    Learning Assessment 9/13/2021   PRIMARY LEARNER Patient   PRIMARY LANGUAGE ENGLISH   LEARNER PREFERENCE PRIMARY DEMONSTRATION     -   ANSWERED BY pt   RELATIONSHIP SELF     Fall Risk Assessment, last 12 mths 9/13/2021   Able to walk? Yes   Fall in past 12 months? 1   Do you feel unsteady? 0   Are you worried about falling 0   Is TUG test greater than 12 seconds? 0   Is the gait abnormal? 1   Number of falls in past 12 months 1   Fall with injury?  0 3 most recent PHQ Screens 9/13/2021   PHQ Not Done -   Little interest or pleasure in doing things Not at all   Feeling down, depressed, irritable, or hopeless Not at all   Total Score PHQ 2 0   Trouble falling or staying asleep, or sleeping too much -   Feeling tired or having little energy -   Poor appetite, weight loss, or overeating -   Feeling bad about yourself - or that you are a failure or have let yourself or your family down -   Trouble concentrating on things such as school, work, reading, or watching TV -   Moving or speaking so slowly that other people could have noticed; or the opposite being so fidgety that others notice -   Thoughts of being better off dead, or hurting yourself in some way -   PHQ 9 Score -     Abuse Screening Questionnaire 9/13/2021   Do you ever feel afraid of your partner? N   Are you in a relationship with someone who physically or mentally threatens you? N   Is it safe for you to go home?  Y       ADL Assessment 9/13/2021   Feeding yourself No Help Needed   Getting from bed to chair No Help Needed   Getting dressed No Help Needed   Bathing or showering No Help Needed   Walk across the room (includes cane/walker) No Help Needed   Using the telphone No Help Needed   Taking your medications Help Needed   Preparing meals Help Needed   Managing money (expenses/bills) No Help Needed   Moderately strenuous housework (laundry) Help Needed   Shopping for personal items (toiletries/medicines) Help Needed   Shopping for groceries Help Needed   Driving Help Needed   Climbing a flight of stairs Help Needed   Getting to places beyond walking distances Help Needed      No notes on file
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
## 2021-09-13 NOTE — TELEPHONE ENCOUNTER
----- Message from [facility] sent at 9/13/2021 11:49 AM EDT -----  Regarding: /Telephone     Caller's first and last name:Lois-daughter  Reason for call:Lois would like to speak the to the doctor but provided no detail as to why.   Callback required yes/no and why:Yes  Best contact number(s):[phone]  Details to clarify the request:n/a
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
## 2021-10-23 PROBLEM — R56.9 SEIZURE (HCC): Status: ACTIVE | Noted: 2021-01-01

## 2021-10-23 NOTE — PROGRESS NOTES
Hospitalist Progress Note    NAME: Jose Collado   :  1933   MRN:  [id number]       Assessment / Plan:  Seizures  Hx of stroke with residual right sided hemiparesis and aphasia  HTN, uncontrolled  HLD  Neurology evals on going   Continue with Keppra 1500mg BID due to witnessed seizure this morning  MRI Brain pending  EEG pending  Decrease IVF rate  IV Hydralazine PRN  Schedule IV Metoprolol q6hrs  Unclear when pt will be able to tolerate PO reliably    Estimated discharge date:   Barriers:    Code status: Full  Prophylaxis: Hep SQ  Recommended Disposition: TBD     Subjective:     Chief Complaint / Reason for Physician Visit  Aphasic. Discussed with RN events overnight. Review of Systems:  Symptom Y/N Comments  Symptom Y/N Comments   Fever/Chills    Chest Pain     Poor Appetite    Edema     Cough    Abdominal Pain     Sputum    Joint Pain     SOB/MCKEON    Pruritis/Rash     Nausea/vomit    Tolerating PT/OT     Diarrhea    Tolerating Diet     Constipation    Other       Could NOT obtain due to:      Objective:     VITALS:   Last 24hrs VS reviewed since prior progress note. Most recent are:  Patient Vitals for the past 24 hrs:   Temp Pulse Resp BP SpO2   10/23/21 1513 99.9 °F (37.7 °C) 100 16 (!) 187/90 97 %   10/23/21 1123 98.8 °F (37.1 °C) 97 16 (!) 195/103 97 %   10/23/21 0927 99.1 °F (37.3 °C) 98 14 (!) 189/91 97 %   10/23/21 0750 99.1 °F (37.3 °C) 96 17 (!) 176/82 100 %   10/23/21 0425 98.7 °F (37.1 °C) 93 20 (!) 154/86      No intake or output data in the 24 hours ending 10/23/21 1710     I had a face to face encounter and independently examined this patient on 10/23/2021, as outlined below:  PHYSICAL EXAM:  General: Awake   EENT:  EOMI. Anicteric sclerae. MMM  Resp:  CTA bilaterally, no wheezing or rales. No accessory muscle use  CV:  Regular  rhythm,  No edema  GI:  Soft, Non distended, Non tender.   +Bowel sounds  Neurologic:  Aphasic, does not follow commands, right sided hemiparesis  Psych:   Poor insight  Skin:  No rashes. No jaundice    Reviewed most current lab test results and cultures  YES  Reviewed most current radiology test results   YES  Review and summation of old records today    NO  Reviewed patient's current orders and MAR    YES  PMH/SH reviewed - no change compared to H&P  ________________________________________________________________________  Care Plan discussed with:    Comments   Patient x    Family      RN x    Care Manager     Consultant  x                      Multidiciplinary team rounds were held today with , nursing, pharmacist and clinical coordinator. Patient's plan of care was discussed; medications were reviewed and discharge planning was addressed. ________________________________________________________________________  Total NON critical care TIME:  35   Minutes    Total CRITICAL CARE TIME Spent:   Minutes non procedure based      Comments   >50% of visit spent in counseling and coordination of care     ________________________________________________________________________  Travis Baker MD     Procedures: see electronic medical records for all procedures/Xrays and details which were not copied into this note but were reviewed prior to creation of Plan. LABS:  I reviewed today's most current labs and imaging studies.   Pertinent labs include:  Recent Labs     10/23/21  0626 10/22/21  2008   WBC 11.2* 6.6   HGB 12.3 11.6   HCT 38.3 36.6    245     Recent Labs     10/23/21  0626 10/22/21  2008    144   K 3.8 3.3*   * 106   CO2 23 34*   * 108*   BUN 12 15   CREA 0.92 1.37*   CA 10.3* 10.1   MG  --  1.7   ALB 2.6* 2.9*   TBILI 1.4* 1.1*   ALT 16 9*       Signed: Travis Baker MD

## 2021-10-23 NOTE — PROGRESS NOTES
Spoke with grandson, Usha Christensen. MRI screening form is completed. He states that the pt's baseline functionality is that she is able to walk around the house, fix meals, and call friends on the phone.

## 2021-10-23 NOTE — PROGRESS NOTES
MRI Screening Form needed. I have called the grandson, Jabier Jane, twice today. There was no answer both times.

## 2021-10-23 NOTE — ED PROVIDER NOTES
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 Patient lives with her grandson, Angie Salgado, he states that she had a normal day today. She had no complaints of fever, sweats, chills, dysuria urgency or frequency, diarrhea or constipation. No recent history of fall or head trauma. No complaints of headache. No complaints of chest pain heaviness pressure neck arm or jaw pain today. No complaints of abdominal pain today. He states that she has mild right-sided residual impairment which may have been will get worse tonight. She was sitting in a sofa, stopped talking, her eyes rolled around she started having generalized tonic-clonic seizure-like activity. 911 was called. Upon arrival EMS states that she was having tonic-clonic seizures which lasted approximately 20 minutes until she was given Versed 10 mg IM which stopped her seizures. She was transported to hospital and was noted to be hypotensive with blood pressures in the 80 systolic range in route. She was given a liter of normal saline in route. No history of drug use or alcohol use. Tomasz Wang stated that he thought that her heart rate was elevated when she was having her seizure-like activity. She has not had a history of seizures in the past.  Patient is not on any obvious medications that lowers seizure threshold. She is on gabapentin for chronic pain and Plavix. No history of bruising or easy bleeding. Patient lives with her grandson, Toni Jaimes, and usually ambulates with a walker.            Past Medical History:   Diagnosis Date    [facility] ([facility] [address]     Constipation     First degree AV block     GERD (gastroesophageal reflux disease)     HTN (hypertension)     Hypercalcemia 10/26/2015    [facility]par[facility]roidism ([facility] [address]     Hypertension     Hypopotassemia     IGT (impaired glucose tolerance)     Neuropathy, diabetic ([facility] [address]     Osteoporosis     2008 Elbow Fx    Other and unspecified hyperlipidemia     Pneumonia 2006    RLL    Skin cancer of nose     Toxic multinodular goiter     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    On C-scope 2008        Past Surgical History:   Procedure Laterality Date    HX APPENDECTOMY      HX BREAST LUMPECTOMY      HX BREAST LUMPECTOMY Left 1998    HX BREAST LUMPECTOMY Right 1999    HX CATARACT REMOVAL Bilateral     HX HEENT      nose surgery after skin cancer removed    HX HEENT      YAG both eyes    HX HYSTERECTOMY  1980    HX LITHOTRIPSY  1979    HX MASTECTOMY Right 2002    HX MASTECTOMY Left     HX OTHER SURGICAL      had left lower rib removed to extract a kidney stone    HX TUBAL LIGATION           Family History:   Problem Relation Age of Onset    Cancer Mother         lymphoma    Heart Disease Father     Heart Disease Maternal Grandmother     Diabetes Maternal Grandfather     Heart Disease Paternal Grandmother     Colon Cancer Maternal Aunt     Other Daughter         Overdose    Thyroid Disease Neg Hx        Social History     Socioeconomic History    Marital status:      Spouse name: Not on file    Number of children: 3    Years of education: 15    Highest education level: Some college, no degree   Occupational History    Not on file   Tobacco Use    Smoking status: Never Smoker    Smokeless tobacco: Never Used   Vaping Use    Vaping Use: Never used   Substance and Sexual Activity    Alcohol use: No     Alcohol/week: 0.0 standard drinks     Comment: on the holidays may have a mixed    Drug use: No    Sexual activity: Not Currently     Partners: Male   Other Topics Concern    Not on file   Social History Narrative    Lives in [place] with her grandson.   Had 3 children (1 son and 2 daughters and one of her daughters passed away). . Then her 2nd fiancee passed away in October 2016 after 25 years of being together. 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 Likes to play [employer] and [employer]. Social Determinants of Health     Financial Resource Strain:     Difficulty of Paying Living Expenses:    Food Insecurity:     Worried About Running Out of Food in the Last Year:     [address] in the Last Year:    Transportation Needs:     Lack of Transportation (Medical):  Lack of Transportation (Non-Medical):    Physical Activity:     Days of Exercise per Week:     Minutes of Exercise per Session:    Stress:     Feeling of Stress :    Social Connections:     Frequency of Communication with Friends and Family:     Frequency of Social Gatherings with Friends and Family:     Attends [employer] Services:     Active Member of Clubs or Organizations:     Attends Club or Organization Meetings:     Marital Status:    Intimate Partner Violence:     Fear of Current or Ex-Partner:     Emotionally Abused:     Physically Abused:     Sexually Abused: ALLERGIES: Latex, Contrast agent [iodine], Doxycycline, Erythromycin, Macrodantin [nitrofurantoin macrocrystalline], Nitrofurantoin, Sulfa (sulfonamide antibiotics), Sulfasalazine, and Tamoxifen    Review of Systems   Unable to perform ROS: Acuity of condition       Vitals:    10/23/21 0130 10/23/21 0146 10/23/21 0159 10/23/21 0200   BP: (!) 157/81 (!) 164/87  (!) 152/90   Pulse:   96 96   Resp: 23 20 17 16   Temp:  97.9 °F (36.6 °C)     SpO2: 100% 98% 96% 95%   Weight:       Height:                Physical Exam  Vitals and nursing note reviewed. Constitutional:       General: She is not in acute distress. Appearance: Normal appearance. She is not ill-appearing or toxic-appearing.       Comments: [de-identified] white female who is very sedated on arrival.   HENT: Head: Normocephalic and atraumatic. Right Ear: External ear normal.      Left Ear: External ear normal.      Nose: Nose normal.      Mouth/Throat:      Mouth: Mucous membranes are moist.      Pharynx: No oropharyngeal exudate or posterior oropharyngeal erythema. Eyes:      Extraocular Movements: Extraocular movements intact. Conjunctiva/sclera: Conjunctivae normal.      Pupils: Pupils are equal, round, and reactive to light. Cardiovascular:      Rate and Rhythm: Normal rate and regular rhythm. Heart sounds: Normal heart sounds. No murmur heard. No friction rub. No gallop. Pulmonary:      Effort: Pulmonary effort is normal. No respiratory distress. Breath sounds: Normal breath sounds. No stridor. No wheezing or rales. Chest:      Chest wall: No tenderness. Abdominal:      General: There is no distension. Palpations: Abdomen is soft. There is no mass. Musculoskeletal:         General: No swelling, tenderness, deformity or signs of injury. Normal range of motion. Cervical back: Normal range of motion and neck supple. No tenderness. No muscular tenderness. Right lower leg: No edema. Left lower leg: No edema. Lymphadenopathy:      Cervical: No cervical adenopathy. Skin:     General: Skin is warm and dry. Capillary Refill: Capillary refill takes less than 2 seconds. Coloration: Skin is not jaundiced or pale. Findings: No bruising or rash. Neurological:      Mental Status: She is alert. Cranial Nerves: No cranial nerve deficit. Sensory: No sensory deficit. Motor: No weakness. Coordination: Coordination normal.      Gait: Gait normal.      Comments: Patient is sedated upon arrival, responds to pain. No facial asymmetry noted. Reflexes 2+ and symmetric in the elbows knees and ankles. No clonus noted. Babinski upgoing on right equivocal on left.    Psychiatric:      Comments: Patient sedated, unable to assess        Labs -  Recent Results (from the past 24 hour(s))   GLUCOSE, POC    Collection Time: 10/22/21  8:07 PM   Result Value Ref Range    Glucose (POC) 100 65 - 117 mg/dL    Performed by Nathan Farrell    CBC WITH AUTOMATED DIFF    Collection Time: 10/22/21  8:08 PM   Result Value Ref Range    WBC 6.6 3.6 - 11.0 K/uL    RBC 4.04 3.80 - 5.20 M/uL    HGB 11.6 11.5 - 16.0 g/dL    HCT 36.6 35.0 - 47.0 %    MCV 90.6 80.0 - 99.0 FL    MCH 28.7 26.0 - 34.0 PG    MCHC 31.7 30.0 - 36.5 g/dL    RDW 13.8 11.5 - 14.5 %    PLATELET 696 492 - 525 K/uL    MPV 10.8 8.9 - 12.9 FL    NRBC 0.0 0  WBC    ABSOLUTE NRBC 0.00 0.00 - 0.01 K/uL    NEUTROPHILS 73 32 - 75 %    LYMPHOCYTES 15 12 - 49 %    MONOCYTES 7 5 - 13 %    EOSINOPHILS 4 0 - 7 %    BASOPHILS 1 0 - 1 %    IMMATURE GRANULOCYTES 0 0.0 - 0.5 %    ABS. NEUTROPHILS 4.7 1.8 - 8.0 K/UL    ABS. LYMPHOCYTES 1.0 0.8 - 3.5 K/UL    ABS. MONOCYTES 0.5 0.0 - 1.0 K/UL    ABS. EOSINOPHILS 0.3 0.0 - 0.4 K/UL    ABS. BASOPHILS 0.1 0.0 - 0.1 K/UL    ABS. IMM. GRANS. 0.0 0.00 - 0.04 K/UL    DF AUTOMATED      PLATELET COMMENTS ADEQUATE PLATELETS      RBC COMMENTS ANISOCYTOSIS  1+        RBC COMMENTS POIKILOCYTOSIS  1+        RBC COMMENTS KIN CELLS  1+        RBC COMMENTS SCHISTOCYTES  1+       METABOLIC PANEL, COMPREHENSIVE    Collection Time: 10/22/21  8:08 PM   Result Value Ref Range    Sodium 144 136 - 145 mmol/L    Potassium 3.3 (L) 3.5 - 5.1 mmol/L    Chloride 106 97 - 108 mmol/L    CO2 34 (H) 21 - 32 mmol/L    Anion gap 4 (L) 5 - 15 mmol/L    Glucose 108 (H) 65 - 100 mg/dL    BUN 15 6 - 20 MG/DL    Creatinine 1.37 (H) 0.55 - 1.02 MG/DL    BUN/Creatinine ratio 11 (L) 12 - 20      GFR est AA 44 (L) >60 ml/min/1.73m2    GFR est non-AA 36 (L) >60 ml/min/1.73m2    Calcium 10.1 8.5 - 10.1 MG/DL    Bilirubin, total 1.1 (H) 0.2 - 1.0 MG/DL    ALT (SGPT) 9 (L) 12 - 78 U/L    AST (SGOT) 18 15 - 37 U/L    Alk.  phosphatase 73 45 - 117 U/L    Protein, total 6.0 (L) 6.4 - 8.2 g/dL    Albumin 2.9 (L) 3.5 - 5.0 g/dL    Globulin 3.1 2.0 - 4.0 g/dL    A-G Ratio 0.9 (L) 1.1 - 2.2     MAGNESIUM    Collection Time: 10/22/21  8:08 PM   Result Value Ref Range    Magnesium 1.7 1.6 - 2.4 mg/dL   LACTIC ACID    Collection Time: 10/22/21  8:08 PM   Result Value Ref Range    Lactic acid 4.7 (HH) 0.4 - 2.0 MMOL/L   TROPONIN-HIGH SENSITIVITY    Collection Time: 10/22/21  8:08 PM   Result Value Ref Range    Troponin-High Sensitivity 30 0 - 51 ng/L   ETHYL ALCOHOL    Collection Time: 10/22/21  8:08 PM   Result Value Ref Range    ALCOHOL(ETHYL),SERUM <10 <10 MG/DL   TSH 3RD GENERATION    Collection Time: 10/22/21  8:08 PM   Result Value Ref Range    TSH 1.47 0.36 - 3.74 uIU/mL   CK    Collection Time: 10/22/21  8:08 PM   Result Value Ref Range    CK 65 26 - 192 U/L   URINALYSIS W/ RFLX MICROSCOPIC    Collection Time: 10/22/21  8:20 PM   Result Value Ref Range    Color YELLOW/STRAW      Appearance CLEAR CLEAR      Specific gravity 1.020 1.003 - 1.030      pH (UA) 6.0 5.0 - 8.0      Protein 30 (A) NEG mg/dL    Glucose Negative NEG mg/dL    Ketone Negative NEG mg/dL    Bilirubin Negative NEG      Blood Negative NEG      Urobilinogen 1.0 0.2 - 1.0 EU/dL    Nitrites Negative NEG      Leukocyte Esterase Negative NEG     DRUG SCREEN, URINE    Collection Time: 10/22/21  8:20 PM   Result Value Ref Range    AMPHETAMINES Negative NEG      BARBITURATES Negative NEG      BENZODIAZEPINES Positive (A) NEG      COCAINE Negative NEG      METHADONE Negative NEG      OPIATES Negative NEG      PCP(PHENCYCLIDINE) Negative NEG      THC (TH-CANNABINOL) Negative NEG      Drug screen comment (NOTE)    COVID-19 WITH INFLUENZA A/B    Collection Time: 10/22/21  9:16 PM   Result Value Ref Range    SARS-CoV-2 Not detected NOTD      Influenza A by PCR Not detected NOTD      Influenza B by PCR Not detected NOTD       Radiologic Studies -   CT Results  (Last 48 hours)               10/22/21 2054  CT HEAD WO CONT Final result    Impression:  Stable left parieto-occipital encephalomalacia. No acute   intracranial hemorrhage, mass or infarct. Narrative:  INDICATION: seizure        Exam: Noncontrast CT of the brain is performed with 5 mm collimation. CT dose reduction was achieved with the use of the standardized protocol   tailored for this examination and automatic exposure control for dose   modulation. Direct comparison is made to CT dated August 2018. FINDINGS: There is left parieto-occipital encephalomalacia, unchanged. There is   no evidence of acute territorial infarct. There is no acute intracranial   hemorrhage, mass, mass effect or herniation. Ventricular system is normal.  The   mastoid air cells are well pneumatized. The visualized paranasal sinuses are   normal.                 XR Results (most recent):  Results from Hospital Encounter encounter on 10/22/21    XR CHEST SNGL V    Narrative  INDICATION: Seizure. Portable AP view of the chest.    Direct comparison made to prior chest x-ray dated April 2016. Cardiomediastinal silhouette is stable. Lungs are clear bilaterally. Pleural  spaces are normal and there is no pneumothorax. Osseous structures are diffusely  demineralized, but intact. Impression  No acute cardiopulmonary disease.         MDM  Number of Diagnoses or Management Options  Status epilepticus [facility]): new and requires workup     Amount and/or Complexity of Data Reviewed  Clinical lab tests: ordered and reviewed  Tests in the radiology section of CPT®: ordered and reviewed  Tests in the medicine section of CPT®: ordered and reviewed  Decide to obtain previous medical records or to obtain history from someone other than the patient: yes  Obtain history from someone other than the patient: yes ([address])  Review and summarize past medical records: yes  Discuss the patient with other providers: yes (Dr. Katlyn Dickinson, Dr. Himanshu Arevalo)    Risk of Complications, Morbidity, and/or Mortality  Presenting problems: high  Diagnostic procedures: high  Management options: high  General comments: Different includes new onset seizure, status epilepticus, intracerebral hemorrhage, subdural or epidural hematoma, CVA, metabolic abnormality, electrolyte abnormality    Patient Progress  Patient progress: improved    ED Course as of Oct 23 0223   Fri Oct 22, 2021   2035 EKG demonstrates sinus rhythm with first-degree AV block, ventricular rate normal at 93, VT prolonged at 262, QRS normal at 84, QTc 472, no evidence of acute ischemia or infarction. No significant change when compared to 26 July 2018. [PS]   2115 I have contacted transfer center to discuss care with neurology as patient has had a prolonged episode of seizure and has been sleepy and sedated for about the last hour. She is starting to wake up at this time. Labs are reviewed and remarkable for lactic acid elevated 4.7, CK was normal magnesium was normal, troponin was negative, EtOH is negative TSH is normal, urinalysis negative, UDS is positive for benzos otherwise negative, CBC is unremarkable, CMP remarkable for low potassium of 3.3, anion gap of 4, sugar was 108, BUN 15 creatinine 1.37, GFR is 36 and CO2 is 34. Patient has chronic renal insufficiency and usually has a GFR in the 36 range. Chest x-ray is unremarkable, cranial CT interpreted as IMPRESSION  Stable left parieto-occipital encephalomalacia. No acute intracranial hemorrhage, mass or infarct. Will obtain Covid screening test as anticipate patient possibly could be transferred. [PS]   4675 Care discussed with Dr. Iona Hardy at [facility] who agrees with treating patient with 1 g of Keppra and transfer to their facility for further neurologic evaluation not available here. Will discuss care with hospitalist.    [PS]   [id number] Patient accepted by hospitalist Dr. Kym Giron to [facility]. Patient will be initiated as an ER to ER transfer for holding, awaiting bed assignment.   Care discussed with Dr. Melissa Caballero in the ER    [PS]   [id number] Patient has been stable while here in the ED. She is awake and alert and moving extremities although she has not been very conversant. Vital signs have been stable. [PS]   Sat Oct 23, 2021   0136 Patient appears to be anxious at this time, asking us to help her but cannot express what is bothering her. Vital signs appear to be stable. She does not appear to be having pain. Patient does have a history of dementia and I wonder if she may be having some sundowning or effects of her dementia and anxiety. I will treat her with Ativan for anxiety. [PS]   0221 Patient appears to be less anxious and is resting after small dose of Ativan. Hemodynamically is stable, no further seizure activity is noted during this ED course. [facility] here to transfer patient.     [PS]      ED Course User Index  [PS] Rita De La Cruz MD       Critical Care  Performed by: Rita De La Cruz MD  Authorized by: Rita De La Cruz MD     Critical care provider statement:     Critical care time (minutes):  35    Critical care time was exclusive of:  Separately billable procedures and treating other patients and teaching time    Critical care was necessary to treat or prevent imminent or life-threatening deterioration of the following conditions:  CNS failure or compromise and shock    Critical care was time spent personally by me on the following activities:  Development of treatment plan with patient or surrogate, discussions with consultants, evaluation of patient's response to treatment, examination of patient, obtaining history from patient or surrogate, ordering and performing treatments and interventions, ordering and review of laboratory studies, ordering and review of radiographic studies, pulse oximetry, re-evaluation of patient's condition and review of old charts    I assumed direction of critical care for this patient from another provider in my specialty: no        Orders Placed This Encounter    COVID-19 WITH INFLUENZA A/B    XR CHEST SNGL V    CT HEAD WO CONT    CBC WITH AUTOMATED DIFF    CMP    MAGNESIUM    LACTIC ACID, PLASMA    TROPONIN-HIGH SENSITIVITY    ALCOHOL    TSH, 3RD GENERATION    URINALYSIS W/ RFLX MICROSCOPIC    DRUG SCREEN, URINE    CK    CARDIAC MONITOR - ED ONLY    B/P    Turn or assist with turn approximately every 2 hours if patient is unable to turn self. Remind patient to turn if necessary.     Assess skin per unit guidelines    NURSING-MISCELLANEOUS: Pad/offload medical devices CONTINUOUS    NURSING-MISCELLANEOUS: Maintain HOB at the lowest elevation consistent with medical plan of care CONTINUOUS    CRITICAL CARE (ASAP ONLY)    RT--OXIMETRY, CONTINUOUS    GLUCOSE, POC    EKG 12 LEAD INITIAL    SALINE LOCK IV ONE TIME STAT    sodium chloride (NS) flush 5-10 mL    sodium chloride 0.9 % bolus infusion 1,000 mL    levETIRAcetam (KEPPRA) 1,000 mg in 0.9% sodium chloride 100 mL IVPB    0.9% sodium chloride infusion    LORazepam (ATIVAN) injection 0.26 mg       Medications   levETIRAcetam (KEPPRA) 1,000 mg in 0.9% sodium chloride 100 mL IVPB (0 mg IntraVENous IV Completed 10/22/21 2218)   0.9% sodium chloride infusion (0 mL/hr IntraVENous Continued On External Transport 10/23/21 0206)   sodium chloride (NS) flush 5-10 mL (10 mL IntraVENous Given 10/23/21 0143)   sodium chloride 0.9 % bolus infusion 1,000 mL (0 mL IntraVENous IV Completed 10/22/21 2148)   LORazepam (ATIVAN) injection 0.26 mg (0.26 mg IntraVENous Given 10/23/21 0142)       Patient Vitals for the past 8 hrs:   Temp Pulse Resp BP SpO2   10/23/21 0200  96 16 (!) 152/90 95 %   10/23/21 0159  96 17  96 %   10/23/21 0146 97.9 °F (36.6 °C)  20 (!) 164/87 98 %   10/23/21 0130   23 (!) 157/81 100 %   10/23/21 0124  92 17 (!) 167/89 98 %   10/23/21 0100  92 16 (!) 164/90 98 %   10/23/21 0045  98 15 (!) 176/92 100 %   10/23/21 0030  91 18 (!) 176/93 98 %   10/23/21 0000  96 13 (!) 178/102 99 %   10/22/21 2334  99 19 (!) 175/104 98 %   10/22/21 2332  96 16 (!) 186/109 98 %   10/22/21 2315  96 14 (!) 174/102 98 %   10/22/21 2300  98 17 (!) 175/97 99 %   10/22/21 2255  99 13 (!) 172/99 99 %   10/22/21 2245  94 18 (!) 160/95 97 %   10/22/21 2230  98 21 (!) 179/108 98 %   10/22/21 2215  (!) 109 18 (!) 177/111 100 %   10/22/21 2130  (!) 115 17 (!) 139/93 100 %   10/22/21 2115  96 16 (!) 140/93 100 %   10/22/21 2106  94 14 (!) 144/84 100 %   10/22/21 2036     97 %   10/22/21 2036  89 17 (!) 153/90 97 %   10/22/21 2015  93 20 127/78 93 %   10/22/21 2011  90 14 125/73 96 %   10/22/21 2003 98.3 °F (36.8 °C) 95 15 125/73 98 %         DIAGNOSIS:      ICD-10-CM ICD-9-CM    1. Status epilepticus (HCC)  G40.901 345.3         I have reviewed all pertinent and currently available diagnostic test results for this visit including, but not limited to, labs, xrays, and EKGs. I have reviewed all pertinent and currently available medical records, past medical history, social history and family history. My plan of care and further evaluation and/or disposition is based on these results, as well as the initial, and subsequent, history and physical exam, as well as any additional complaints during the visit. Laboratory tests, medications, x-rays, diagnosis, and need for admission or transfer have been reviewed and discussed with the patient and/or family. Pt and/or family have had the opportunity to ask questions about their care. Patient and/or family verbalized understanding and agreement with care plan. After review of patient's ED evaluation I feel patient will benefit from admission to hospital due to further evaluation and treatment of new onset status epilepticus, IV antiepileptic medications, neurologic evaluation not available here. Patient is being transferred due to the need for specialized care not available at our facility.  I feel that the patient is stable for transfer by ambulance. Ambulance transfer is justified by the need for continuous monitoring, IV fluids, and/or oxygen, and potential need for intervention by ambulance personnel en route. Silvia Palacois MD    Please note that this dictation was completed with Chat[employer], the computer voice recognition software. Quite often unanticipated grammatical, syntax, homophones, and other interpretive errors are inadvertently transcribed by the computer software. Please disregard these errors. Please excuse any errors that have escaped final proofreading.

## 2021-10-23 NOTE — PROGRESS NOTES
Bedside, Verbal and Written shift change report given to Hardeep Barrett (oncoming nurse) by Janeen Gupta (offgoing nurse). Report included the following information SBAR, Kardex, ED Summary, Procedure Summary, Intake/Output, MAR, Accordion and Recent Results.

## 2021-10-23 NOTE — PROGRESS NOTES
End of Shift Note    Bedside shift change report given to Trent Chapa RN (oncoming nurse) by Selvin Davis RN (offgoing nurse). Report included the following information SBAR, ED Summary, Recent Results and Med Rec Status    Shift worked:  7a-7p   Shift summary and any significant changes:     Pt has MRI screening form completed and faxed. Eloina Quiles was updated on pt condition and states he will visit tomorrow. Pt has been moaning/mumbling for most of the day and appears to be in pain. MD notified, saw pt, and MD determined no pain meds needed at that time. Please follow up on pain management for this pt.        Concerns for physician to address:  Pain management   Zone phone for oncoming shift:   [phone]     Patient Information  Leanne Farmer  80 y.o.  10/23/2021  5:02 AM by Karin Lemus MD. Pascual Jaimes was admitted from Home    Problem List  Patient Active Problem List    Diagnosis Date Noted    Seizure [facility]) 10/23/2021    Mixed hyperlipidemia 07/31/2020    History of breast cancer 03/12/2020    CKD (chronic kidney disease) stage 3, GFR 30-59 ml/min ([address] 03/12/2020    Stroke (cerebrum) ([address] 07/23/2018    S/P left mastectomy 01/06/2017    Toxic multinodular goiter 06/17/2016    Osteoporosis 02/22/2016    Hypercalcemia 10/26/2015    Encounter for long-term (current) drug use 10/21/2015    Constipation     Hypertension     GERD (gastroesophageal reflux disease)     Skin cancer of nose      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    On C-scope 2008          Activity:  Activity Level: Bed Rest  Number times ambulated in hallways past shift: 0  Number of times OOB to chair past shift: 0    Cardiac:   Cardiac Monitoring: Yes      Cardiac Rhythm: Sinus Rhythm    Access:   Current line(s): PIV     Genitourinary:   Urinary status: incontinent      Respiratory:      Chronic home O2 use?: NO  Incentive spirometer at bedside: NO       GI:  Last Bowel Movement Date: 10/22/21  Current diet:  DIET NPO  Passing flatus: YES  Tolerating current diet: unable to assess       Pain Management:   Patient states pain is manageable on current regimen: NO    Skin:  Fernando Score: 12  Interventions: PT/OT consult, limit briefs and internal/external urinary devices    Patient Safety:  Fall Score:  Total Score: 4  Interventions: bed/chair alarm and gripper socks  High Fall Risk: Yes  @Rollbelt  @dexterity to release roll belt  Yes/No ( must document dexterity  here by stating Yes or No here, otherwise this is a restraint and must follow restraint documentation policy.)    DVT prophylaxis:  DVT prophylaxis Med- Yes      Wounds: (If Applicable)  Wounds- No  Location     Active Consults:  IP CONSULT TO NEUROLOGY    Length of Stay:  Expected LOS: - - -  Actual LOS: 0  Discharge Plan: MICHELE Sibley RN

## 2021-10-23 NOTE — PROGRESS NOTES
-Please complete MRI History and Safety Screening Form   - Patient cannot be scanned until this form is completed and reviewed in MRI to ensure patient is SAFE and eligible for MRI. - CALL MRI when this has been successfully completed at [phone].

## 2021-10-23 NOTE — ED TRIAGE NOTES
Patient arrived via EMS, ems reported that patient was actively having seizures at the time of their arrival, patient was administered versed, seizure activities stopped. Patient has been unresponsive seen medication was given. Upon arrival patient responsive to pain with limb movement and opening of the eyes. Family denies past medical history of seizures.

## 2021-10-23 NOTE — ED NOTES
Report to Irvin Mota with [facility]. Pt SR on cardiac monitor. Resting - roused to voice. NS infusing to RFA without complication at [id number]/NB. IV LAC and RFA intact without sign or symptom of infiltration. Brief dry.

## 2021-10-23 NOTE — PROGRESS NOTES
Contacted [facility] to arrange ALS transport of patient to ED [facility] ED. Spoke with Temo. Discussed patient condition, and need for cardiac monitoring. ETA is 0230. ED is aware.

## 2021-10-23 NOTE — H&P
Hospitalist Admission Note    NAME: Korina Ordonez   :  1933   MRN:  [id number]     Date/Time:  10/23/2021 5:37 AM    Patient PCP: Vanessa Hong MD  ______________________________________________________________________  Given the patient's current clinical presentation, I have a high level of concern for decompensation if discharged from the emergency department. Complex decision making was performed, which includes reviewing the patient's available past medical records, laboratory results, and x-ray films. My assessment of this patient's clinical condition and my plan of care is as follows. Assessment / Plan:  New onset seizure  Admit patient to telemetry  Seizure precautionstart patient on Keppra  Neurology consultation  Brain MRI    Hypertension  Continue home antihypertensive medication    GERD  Continue PPI    History of hyperthyroidism  Continue methimazole    Hyperlipidemia  Continue Lipitor    HTN  Continue home antihypertensive medication      Code Status: Full  Surrogate Decision Maker:    DVT Prophylaxis: Heparin   GI Prophylaxis: not indicated          Subjective:   CHIEF COMPLAINT: Seizure     HISTORY OF PRESENT ILLNESS:     80years old female with past medical history significant for hyperthyroidism , hypertension, GERD, history of breast cancer transferred from [facility] for evaluation of new onset tonic-clonic seizure started yesterday, patient was sitting in in the Sofa and patient stopped talking on her eyes rolled back and she developed tonicoclonic seizure, patient seizure lasted about 20 minutes and patient was giving Versed IM and her seizure stopped, blood work was significant for low potassium level 3.3, elevated lactic acid 4.7, head CT was done show no acute abnormality. We were asked to admit for work up and evaluation of the above problems.      Past Medical History:   Diagnosis Date    Breast cancer ([address]     Constipation     First degree AV block     GERD (gastroesophageal reflux disease)     HTN (hypertension)     Hypercalcemia 10/26/2015    [facility]     Hypertension     Hypopotassemia     IGT (impaired glucose tolerance)     Neuropathy, diabetic [facility]     Osteoporosis     2008 Elbow Fx    Other and unspecified hyperlipidemia     Pneumonia 2006    RLL    Skin cancer of nose     Toxic multinodular goiter     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    On C-scope 2008         Past Surgical History:   Procedure Laterality Date    HX APPENDECTOMY      HX BREAST LUMPECTOMY      HX BREAST LUMPECTOMY Left 1998    HX BREAST LUMPECTOMY Right 1999    HX CATARACT REMOVAL Bilateral     HX HEENT      nose surgery after skin cancer removed    HX HEENT      YAG both eyes    HX HYSTERECTOMY  1980    HX LITHOTRIPSY  1979    HX MASTECTOMY Right 2002    HX MASTECTOMY Left     HX OTHER SURGICAL      had left lower rib removed to extract a kidney stone    HX TUBAL LIGATION         Social History     Tobacco Use    Smoking status: Never Smoker    Smokeless tobacco: Never Used   Substance Use Topics    Alcohol use: No     Alcohol/week: 0.0 standard drinks     Comment: on the holidays may have a mixed        Family History   Problem Relation Age of Onset    Cancer Mother         lymphoma    Heart Disease Father     Heart Disease Maternal Grandmother     Diabetes Maternal Grandfather     Heart Disease Paternal Grandmother     Colon Cancer Maternal Aunt     Other Daughter         Overdose    Thyroid Disease Neg Hx      Allergies   Allergen Reactions    Latex Rash    Contrast Agent [Iodine] Unknown (comments) and Hives    Doxycycline Unknown (comments) and Hives    Erythromycin Unknown (comments) and Hives    Macrodantin [Nitrofurantoin Macrocrystalline] Unknown (comments)    Nitrofurantoin Hives    Sulfa (Sulfonamide Antibiotics) Hives     Eyes turned red    Sulfasalazine Hives     Eyes turned red  Tamoxifen Other (comments) and Rash        Prior to Admission medications    Medication Sig Start Date End Date Taking? Authorizing Provider   methIMAzole (TAPAZOLE) 5 mg tablet 1 tab mon, wed, fri, sat for thyroid 9/13/21   Rona Caruso MD   pantoprazole (PROTONIX) 40 mg tablet TAKE 1 TABLET BY MOUTH DAILY 7/28/21   Rona Caruso MD   clopidogreL (PLAVIX) 75 mg tab Take 1 Tablet by mouth daily. Indications: prevent strokes 6/14/21   Rona Caruso MD   gabapentin (NEURONTIN) 100 mg capsule TAKE 2 CAPSULES BY MOUTH EVERY NIGHT FOR NERVES 4/29/21   Rona Caruso MD   mupirocin (BACTROBAN) 2 % ointment Apply 1 Applicator to affected area two (2) times a day. 3/3/21   Provider, Historical   triamcinolone acetonide (KENALOG) 0.1 % topical cream Apply 1 Applicator to affected area as needed. 3/3/21   Provider, Historical   ondansetron (ZOFRAN ODT) 4 mg disintegrating tablet Take 1 Tab by mouth every eight (8) hours as needed for Nausea or Vomiting. 1/22/21   Rona Caruso MD   bisoprolol (ZEBETA) 10 mg tablet Take 1 Tab by mouth two (2) times a day. Indications: pressure and thyroid 12/15/20   Rona Caruso MD   furosemide (LASIX) 20 mg tablet 1 daily for 1 week for swollen ankles, then 1 daily as needed for swelling  Indications: fluid 11/30/20   Maia Barnett MD   lisinopriL (PRINIVIL, ZESTRIL) 20 mg tablet Take 1 Tab by mouth two (2) times a day. Indications: pressure 11/30/20   Maia Barnett MD   atorvastatin (LIPITOR) 40 mg tablet TAKE 1 TABLET BY MOUTH DAILY for cholesterol and stroke prevention 10/28/20   Rona Caruso MD   scopolamine (TRANSDERM-SCOP) 1 mg over 3 days pt3d 1 Patch by TransDERmal route every seventy-two (72) hours. Or nausea  Indications: motion sickness 1/16/20   Mrey Cordero MD   dicyclomine (BENTYL) 20 mg tablet Take 1 Tab by mouth every six (6) hours as needed (abdominal cramps) for up to 20 doses.  1/13/20   [facility] Jabari Bell MD   polyethylene glycol (MIRALAX) 17 gram/dose powder Take 17 g by mouth every Monday, Wednesday, Friday. 10/26/18   Rochelle Leung MD   cyanocobalamin (VITAMIN B-12) 1,000 mcg tablet Take 1,000 mcg by mouth daily. Provider, Historical       REVIEW OF SYSTEMS:     I am not able to complete the review of systems because: The patient is intubated and sedated    The patient has altered mental status due to his acute medical problems    The patient has baseline aphasia from prior stroke(s)    The patient has baseline dementia and is not reliable historian    The patient is in acute medical distress and unable to provide information           Total of 12 systems reviewed as follows:       POSITIVE= underlined text  Negative = text not underlined  General:  fever, chills, sweats, generalized weakness, weight loss/gain,      loss of appetite   Eyes:    blurred vision, eye pain, loss of vision, double vision  ENT:    rhinorrhea, pharyngitis   Respiratory:   cough, sputum production, SOB, MCKEON, wheezing, pleuritic pain   Cardiology:   chest pain, palpitations, orthopnea, PND, edema, syncope   Gastrointestinal:  abdominal pain , N/V, diarrhea, dysphagia, constipation, bleeding   Genitourinary:  frequency, urgency, dysuria, hematuria, incontinence   Muskuloskeletal :  arthralgia, myalgia, back pain  Hematology:  easy bruising, nose or gum bleeding, lymphadenopathy   Dermatological: rash, ulceration, pruritis, color change / jaundice  Endocrine:   hot flashes or polydipsia   Neurological:  headache, dizziness, confusion, focal weakness, paresthesia,     Speech difficulties, memory loss, gait difficulty  Psychological: Feelings of anxiety, depression, agitation    Objective:   VITALS:    Visit Vitals  BP (!) 154/86 (BP 1 Location: Right upper arm, BP Patient Position: At rest)   Pulse 93   Temp 98.7 °F (37.1 °C)   Resp 20       PHYSICAL EXAM:    General:    Alert, cooperative, no distress, appears stated age. HEENT: Atraumatic, anicteric sclerae, pink conjunctivae     No oral ulcers, mucosa moist, throat clear, dentition fair  Neck:  Supple, symmetrical,  thyroid: non tender  Lungs:   Clear to auscultation bilaterally. No Wheezing or Rhonchi. No rales. Chest wall:  No tenderness  No Accessory muscle use. Heart:   Regular  rhythm,  No  murmur   No edema  Abdomen:   Soft, non-tender. Not distended. Bowel sounds normal  Extremities: No cyanosis. No clubbing,      Skin turgor normal, Capillary refill normal, Radial dial pulse 2+  Skin:     Not pale. Not Jaundiced  No rashes   Psych:  Good insight. Not depressed. Not anxious or agitated. Neurologic: EOMs intact. No facial asymmetry. No aphasia or slurred speech. Symmetrical strength, Sensation grossly intact. Alert and oriented X 4.     _______________________________________________________________________  Care Plan discussed with:    Comments   Patient y    Family      RN y    Care Manager                    Consultant:      _______________________________________________________________________  Expected  Disposition:   Home with Family y   HH/PT/OT/RN    SNF/LTC    IRON    ________________________________________________________________________  TOTAL TIME:  54 Minutes    Critical Care Provided     Minutes non procedure based      Comments    y Reviewed previous records   >50% of visit spent in counseling and coordination of care y Discussion with patient and/or family and questions answered       ________________________________________________________________________  Signed: Wade Lira MD    Procedures: see electronic medical records for all procedures/Xrays and details which were not copied into this note but were reviewed prior to creation of Plan.     LAB DATA REVIEWED:    Recent Results (from the past 24 hour(s))   GLUCOSE, POC    Collection Time: 10/22/21  8:07 PM   Result Value Ref Range    Glucose (POC) 100 65 - 117 mg/dL    Performed by Rebecca Huggins Denice    CBC WITH AUTOMATED DIFF    Collection Time: 10/22/21  8:08 PM   Result Value Ref Range    WBC 6.6 3.6 - 11.0 K/uL    RBC 4.04 3.80 - 5.20 M/uL    HGB 11.6 11.5 - 16.0 g/dL    HCT 36.6 35.0 - 47.0 %    MCV 90.6 80.0 - 99.0 FL    MCH 28.7 26.0 - 34.0 PG    MCHC 31.7 30.0 - 36.5 g/dL    RDW 13.8 11.5 - 14.5 %    PLATELET 055 388 - 920 K/uL    MPV 10.8 8.9 - 12.9 FL    NRBC 0.0 0  WBC    ABSOLUTE NRBC 0.00 0.00 - 0.01 K/uL    NEUTROPHILS 73 32 - 75 %    LYMPHOCYTES 15 12 - 49 %    MONOCYTES 7 5 - 13 %    EOSINOPHILS 4 0 - 7 %    BASOPHILS 1 0 - 1 %    IMMATURE GRANULOCYTES 0 0.0 - 0.5 %    ABS. NEUTROPHILS 4.7 1.8 - 8.0 K/UL    ABS. LYMPHOCYTES 1.0 0.8 - 3.5 K/UL    ABS. MONOCYTES 0.5 0.0 - 1.0 K/UL    ABS. EOSINOPHILS 0.3 0.0 - 0.4 K/UL    ABS. BASOPHILS 0.1 0.0 - 0.1 K/UL    ABS. IMM. GRANS. 0.0 0.00 - 0.04 K/UL    DF AUTOMATED      PLATELET COMMENTS ADEQUATE PLATELETS      RBC COMMENTS ANISOCYTOSIS  1+        RBC COMMENTS POIKILOCYTOSIS  1+        RBC COMMENTS KIN CELLS  1+        RBC COMMENTS SCHISTOCYTES  1+       METABOLIC PANEL, COMPREHENSIVE    Collection Time: 10/22/21  8:08 PM   Result Value Ref Range    Sodium 144 136 - 145 mmol/L    Potassium 3.3 (L) 3.5 - 5.1 mmol/L    Chloride 106 97 - 108 mmol/L    CO2 34 (H) 21 - 32 mmol/L    Anion gap 4 (L) 5 - 15 mmol/L    Glucose 108 (H) 65 - 100 mg/dL    BUN 15 6 - 20 MG/DL    Creatinine 1.37 (H) 0.55 - 1.02 MG/DL    BUN/Creatinine ratio 11 (L) 12 - 20      GFR est AA 44 (L) >60 ml/min/1.73m2    GFR est non-AA 36 (L) >60 ml/min/1.73m2    Calcium 10.1 8.5 - 10.1 MG/DL    Bilirubin, total 1.1 (H) 0.2 - 1.0 MG/DL    ALT (SGPT) 9 (L) 12 - 78 U/L    AST (SGOT) 18 15 - 37 U/L    Alk.  phosphatase 73 45 - 117 U/L    Protein, total 6.0 (L) 6.4 - 8.2 g/dL    Albumin 2.9 (L) 3.5 - 5.0 g/dL    Globulin 3.1 2.0 - 4.0 g/dL    A-G Ratio 0.9 (L) 1.1 - 2.2     MAGNESIUM    Collection Time: 10/22/21  8:08 PM   Result Value Ref Range    Magnesium 1.7 1.6 - 2.4 mg/dL LACTIC ACID    Collection Time: 10/22/21  8:08 PM   Result Value Ref Range    Lactic acid 4.7 (HH) 0.4 - 2.0 MMOL/L   TROPONIN-HIGH SENSITIVITY    Collection Time: 10/22/21  8:08 PM   Result Value Ref Range    Troponin-High Sensitivity 30 0 - 51 ng/L   ETHYL ALCOHOL    Collection Time: 10/22/21  8:08 PM   Result Value Ref Range    ALCOHOL(ETHYL),SERUM <10 <10 MG/DL   TSH 3RD GENERATION    Collection Time: 10/22/21  8:08 PM   Result Value Ref Range    TSH 1.47 0.36 - 3.74 uIU/mL   CK    Collection Time: 10/22/21  8:08 PM   Result Value Ref Range    CK 65 26 - 192 U/L   URINALYSIS W/ RFLX MICROSCOPIC    Collection Time: 10/22/21  8:20 PM   Result Value Ref Range    Color YELLOW/STRAW      Appearance CLEAR CLEAR      Specific gravity 1.020 1.003 - 1.030      pH (UA) 6.0 5.0 - 8.0      Protein 30 (A) NEG mg/dL    Glucose Negative NEG mg/dL    Ketone Negative NEG mg/dL    Bilirubin Negative NEG      Blood Negative NEG      Urobilinogen 1.0 0.2 - 1.0 EU/dL    Nitrites Negative NEG      Leukocyte Esterase Negative NEG     DRUG SCREEN, URINE    Collection Time: 10/22/21  8:20 PM   Result Value Ref Range    AMPHETAMINES Negative NEG      BARBITURATES Negative NEG      BENZODIAZEPINES Positive (A) NEG      COCAINE Negative NEG      METHADONE Negative NEG      OPIATES Negative NEG      PCP(PHENCYCLIDINE) Negative NEG      THC (TH-CANNABINOL) Negative NEG      Drug screen comment (NOTE)    COVID-19 WITH INFLUENZA A/B    Collection Time: 10/22/21  9:16 PM   Result Value Ref Range    SARS-CoV-2 Not detected NOTD      Influenza A by PCR Not detected NOTD      Influenza B by PCR Not detected NOTD

## 2021-10-23 NOTE — ED NOTES
Pt incontinent of large amount of urine. Brief changed/pericare performed. Pt moans out with treatment. Verbal reassurance given during care.

## 2021-10-23 NOTE — ED NOTES
TRANSFER - OUT REPORT:    Verbal report given to Ilsa Harris RN on Leanne Rahman  being transferred to [facility] Rm 3105  for urgent transfer       Report consisted of patients Situation, Background, Assessment and   Recommendations    Information from the following report(s) SBAR was reviewed with the receiving nurse. Lines:   Saline Lock 10/22/21 Left Antecubital (Active)       Saline Lock 10/22/21 Anterior;Right Forearm (Active)        Opportunity for questions and clarification was provided.       Patient transported with:   Monitor
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
## 2021-10-23 NOTE — ED NOTES
Pt restless, attempting to remove monitoring devices, moaning out. Brief dry, Repositioned, medicated per MD order.

## 2021-10-24 NOTE — PROGRESS NOTES
Hospitalist Progress Note    NAME: Alen Millard   :  1933   MRN:  [id number]       Assessment / Plan:  Seizures  Hx of stroke with residual right sided hemiparesis and aphasia  HTN, uncontrolled  HLD  Neurology evals on going   Continue with Keppra 1500mg BID due to witnessed seizure this morning  MRI Brain pending  EEG pending  Decrease IVF rate  IV Hydralazine PRN  Schedule IV Metoprolol q6hrs  Unclear when pt will be able to tolerate PO reliably    10/24:  Bedside swallow eval  Continue IV Keppra  MRI Brain, EEG pending  NPO till safe to swallow  Neuro evals appreciated    Estimated discharge date:   Barriers:    Code status: Full  Prophylaxis: Hep SQ  Recommended Disposition: TBD     Subjective:     Chief Complaint / Reason for Physician Visit  Aphasic. Discussed with RN events overnight. Review of Systems:  Symptom Y/N Comments  Symptom Y/N Comments   Fever/Chills    Chest Pain     Poor Appetite    Edema     Cough    Abdominal Pain     Sputum    Joint Pain     SOB/MCKEON    Pruritis/Rash     Nausea/vomit    Tolerating PT/OT     Diarrhea    Tolerating Diet     Constipation    Other       Could NOT obtain due to:      Objective:     VITALS:   Last 24hrs VS reviewed since prior progress note. Most recent are:  Patient Vitals for the past 24 hrs:   Temp Pulse Resp BP SpO2   10/24/21 0613  (!) 106  (!) 154/81    10/24/21 0308 98.6 °F (37 °C) 86 16 138/79 92 %   10/24/21 0040  (!) 104  (!) 142/80    10/23/21 2250 99.1 °F (37.3 °C) 98 16 (!) 140/85 96 %   10/23/21 1931 99.8 °F (37.7 °C) 100 16 (!) 186/86 96 %   10/23/21 1513 99.9 °F (37.7 °C) 100 16 (!) 187/90 97 %     No intake or output data in the 24 hours ending 10/24/21 1310     I had a face to face encounter and independently examined this patient on 10/24/2021, as outlined below:  PHYSICAL EXAM:  General: Awake   EENT:  EOMI. Anicteric sclerae. MMM  Resp:  CTA bilaterally, no wheezing or rales.   No accessory muscle use  CV:  Regular  rhythm,  No edema  GI:  Soft, Non distended, Non tender. +Bowel sounds  Neurologic:  Aphasic, does not follow commands, right sided hemiparesis  Psych:   Poor insight  Skin:  No rashes. No jaundice    Reviewed most current lab test results and cultures  YES  Reviewed most current radiology test results   YES  Review and summation of old records today    NO  Reviewed patient's current orders and MAR    YES  PMH/SH reviewed - no change compared to H&P  ________________________________________________________________________  Care Plan discussed with:    Comments   Patient x    Family      RN x    Care Manager     Consultant  x                      Multidiciplinary team rounds were held today with , nursing, pharmacist and clinical coordinator. Patient's plan of care was discussed; medications were reviewed and discharge planning was addressed. ________________________________________________________________________  Total NON critical care TIME:  35   Minutes    Total CRITICAL CARE TIME Spent:   Minutes non procedure based      Comments   >50% of visit spent in counseling and coordination of care     ________________________________________________________________________  Donna Rothman MD     Procedures: see electronic medical records for all procedures/Xrays and details which were not copied into this note but were reviewed prior to creation of Plan. LABS:  I reviewed today's most current labs and imaging studies.   Pertinent labs include:  Recent Labs     10/23/21  0626 10/22/21  2008   WBC 11.2* 6.6   HGB 12.3 11.6   HCT 38.3 36.6    245     Recent Labs     10/23/21  0626 10/22/21  2008    144   K 3.8 3.3*   * 106   CO2 23 34*   * 108*   BUN 12 15   CREA 0.92 1.37*   CA 10.3* 10.1   MG  --  1.7   ALB 2.6* 2.9*   TBILI 1.4* 1.1*   ALT 16 9*       Signed: Donna Rothman MD

## 2021-10-24 NOTE — PROGRESS NOTES
Neurology Note    Patient ID:  Kimi Jones  [id number]  89 y.o.  8/2/1933      Date of Consultation:  October 24, 2021        Assessment and Plan:    The patient is a pleasant 80-year-old female with history of prior stroke with residual aphasia and right-sided weakness who presents with new onset of seizures which began with status epilepticus. Her examination does reveal aphasia and right-sided weakness. New onset seizures: The etiology of the seizures may be related to prior stroke versus a new vascular event. MRI pending. To be completed this am.  EEG pending  No further seizures overnight or this am.   continue Keppra at 1500 mg twice a day. If additional seizure medication is needed, I will begin Vimpat at 50 mg twice a day. History of stroke:  Risk for stroke include hypertension, dyslipidemia, prior stroke. Continue Plavix  I will order carotid Doppler study. Hypertension: Aggressive control with goal systolic blood pressure under 140  Dyslipidemia: Continue lipid-lowering therapy. lipitor 40 mg  Please check updated hemoglobin A1c and lipid panel. aggressive physical therapy and Occupational Therapy. Neurology will continue to follow along during hospitalization. Subjective: no verbal output this am       History of Present Illness:   Leanne Linn is a 80 y.o. female with a history of hypothyroidism, hypertension, reflux disease and breast cancer who was transferred from [facility] after having a new onset of tonic-clonic seizure activity. She was sitting on her couch at home and the patient stopped talking with her eyes rolled back in her head and developed tonic-clonic seizure activity. The seizure reportedly lasted close to 20 minutes and the patient was given then dose of Versed by EMS and the seizure stopped. She was noted to have a low potassium and elevated lactic acid. Head CT done at [facility] revealed no abnormalities.   She was started on Keppra at the outside hospital. No further seizures over the past 24 hours.     Past Medical History:   Diagnosis Date    Breast cancer ([facility] [address]     Constipation     First degree AV block     GERD (gastroesophageal reflux disease)     HTN (hypertension)     Hypercalcemia 10/26/2015    Hyperparathyroidism ([facility] [address]     Hypertension     Hypopotassemia     IGT (impaired glucose tolerance)     Neuropathy, diabetic ([facility] [address]     Osteoporosis     2008 Elbow Fx    Other and unspecified hyperlipidemia     Pneumonia 2006    RLL    Skin cancer of nose     Toxic multinodular goiter     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    On C-scope 2008         Past Surgical History:   Procedure Laterality Date    HX APPENDECTOMY      HX BREAST LUMPECTOMY      HX BREAST LUMPECTOMY Left 1998    HX BREAST LUMPECTOMY Right 1999    HX CATARACT REMOVAL Bilateral     HX HEENT      nose surgery after skin cancer removed    HX HEENT      YAG both eyes    HX HYSTERECTOMY  1980    HX LITHOTRIPSY  1979    HX MASTECTOMY Right 2002    HX MASTECTOMY Left     HX OTHER SURGICAL      had left lower rib removed to extract a kidney stone    HX TUBAL LIGATION          Family History   Problem Relation Age of Onset    Cancer Mother         lymphoma    Heart Disease Father     Heart Disease Maternal Grandmother     Diabetes Maternal Grandfather     Heart Disease Paternal Grandmother     Colon Cancer Maternal Aunt     Other Daughter         Overdose    Thyroid Disease Neg Hx         Social History     Tobacco Use    Smoking status: Never Smoker    Smokeless tobacco: Never Used   Substance Use Topics    Alcohol use: No     Alcohol/week: 0.0 standard drinks     Comment: on the holidays may have a mixed        Allergies   Allergen Reactions    Latex Rash    Contrast Agent [Iodine] Unknown (comments) and Hives    Doxycycline Unknown (comments) and Hives    Erythromycin Unknown (comments) and Hives    Macrodantin [Nitrofurantoin Macrocrystalline] Unknown (comments)    Nitrofurantoin Hives    Sulfa (Sulfonamide Antibiotics) Hives     Eyes turned red    Sulfasalazine Hives     Eyes turned red    Tamoxifen Other (comments) and Rash          Current Facility-Administered Medications   Medication Dose Route Frequency    sodium chloride (NS) flush 5-40 mL  5-40 mL IntraVENous Q8H    sodium chloride (NS) flush 5-40 mL  5-40 mL IntraVENous PRN    LORazepam (ATIVAN) injection 2 mg  2 mg IntraVENous Q5MIN PRN    heparin (porcine) injection 5,000 Units  5,000 Units SubCUTAneous Q12H    dextrose 5% - 0.9% NaCl with KCl 20 mEq/L infusion  50 mL/hr IntraVENous CONTINUOUS    clopidogreL (PLAVIX) tablet 75 mg  75 mg Oral DAILY    methIMAzole (TAPAZOLE) tablet 5 mg  5 mg Oral DAILY    atorvastatin (LIPITOR) tablet 40 mg  40 mg Oral QHS    levETIRAcetam (KEPPRA) 1,500 mg in 0.9% sodium chloride 100 mL IVPB  1,500 mg IntraVENous Q12H    hydrALAZINE (APRESOLINE) 20 mg/mL injection 10 mg  10 mg IntraVENous Q6H PRN    metoprolol (LOPRESSOR) injection 2.5 mg  2.5 mg IntraVENous Q6H         Review of Systems:    [x]Unable to obtain  ROS due to  [x]mental status change  []sedated   []intubated    Objective:     Visit Vitals  BP (!) 154/81 (BP 1 Location: Right upper arm)   Pulse (!) 106   Temp 98.6 °F (37 °C)   Resp 16   SpO2 92%       Physical Exam:    General:  appears well nourished in no acute distress  Neck: no carotid bruits  Lungs: clear to auscultation  Heart:  no murmurs, regular rate  Lower extremity: peripheral pulses palpable and no edema  Skin: intact    Neurological exam:    Arousable. She would not follow commands. She had minimal verbal output today. She would moan in discomfort. Cranial nerves:   II-XII were tested    Perrrla  Visual huston were full. She does blink to visual threat.   There is a mild right gaze preference  Eomi, no evidence of nystagmus  Facial sensation:  normal and symmetric  Facial motor: Right facial droop  Hearing intact  SCM strength intact  Tongue: midline without fasciculations    Motor:   No evidence of fasciculations    Strength testing:  She cannot participate fully with motor testing but the right hemiparesis continues to be present    Sensory:  Withdraws to painful stimulation times all 4    Reflexes:    Right Left  Biceps  2 2  Triceps 2 2  Brachiorad. 2 2  Patella  2 2  Achilles - -    Plantar response: Extensor on the right      Cerebellar testing:  no tremor apparent,     Labs:     Lab Results   Component Value Date/Time    Hemoglobin A1c 6.2 07/24/2018 05:05 AM    Sodium 138 10/23/2021 06:26 AM    Potassium 3.8 10/23/2021 06:26 AM    Chloride 110 (H) 10/23/2021 06:26 AM    Glucose 102 (H) 10/23/2021 06:26 AM    BUN 12 10/23/2021 06:26 AM    Creatinine 0.92 10/23/2021 06:26 AM    Calcium 10.3 (H) 10/23/2021 06:26 AM    WBC 11.2 (H) 10/23/2021 06:26 AM    HCT 38.3 10/23/2021 06:26 AM    HGB 12.3 10/23/2021 06:26 AM    PLATELET 767 37/61/3355 06:26 AM       Imaging:    Results from Hospital Encounter encounter on 07/23/18    MRI BRAIN WO CONT    Narrative      EXAM:  MRI BRAIN WO CONT    INDICATION:    possible stroke    COMPARISON:  CT head 7/23/2018. CONTRAST: None. TECHNIQUE:  Multiplanar multisequence acquisition without contrast of the brain. FINDINGS:  Acute cortical/subcortical infarcts involving the left parieto-occipital lobe  and left posterior insula, and additional acute infarct in the deep white matter  of the left frontoparietal lobe. There is no significant mass effect. No  evidence of acute hemorrhage. Mild generalized parenchymal volume loss with commensurate dilation of the sulci  and ventricular system. Scattered periventricular and deep white matter T2/FLAIR  hyperintensities, consistent with mild chronic microvascular ischemic disease. Small chronic microhemorrhage in the right thalamus. There is no cerebellar  tonsillar herniation.  Expected arterial flow-voids are present. The paranasal sinuses, mastoid air cells, and middle ears are clear. The orbital  contents are within normal limits. No significant osseous or scalp lesions are  identified. Impression  IMPRESSION:  1. Acute infarcts in the left frontoparietal deep white matter, posterior  insula, and parieto-occipital lobe. No mass effect. No acute intracranial  hemorrhage. Results from [place] encounter on 10/22/21    CT HEAD WO CONT    Narrative  INDICATION: seizure    Exam: Noncontrast CT of the brain is performed with 5 mm collimation. CT dose reduction was achieved with the use of the standardized protocol  tailored for this examination and automatic exposure control for dose  modulation. Direct comparison is made to CT dated August 2018. FINDINGS: There is left parieto-occipital encephalomalacia, unchanged. There is  no evidence of acute territorial infarct. There is no acute intracranial  hemorrhage, mass, mass effect or herniation. Ventricular system is normal.  The  mastoid air cells are well pneumatized. The visualized paranasal sinuses are  normal.    Impression  Stable left parieto-occipital encephalomalacia. No acute  intracranial hemorrhage, mass or infarct. head CT from October 22, 2021. There was atrophy and significant left parietal occipital encephalomalacia. Creatinine 0.9, liver function normal , albumin 2.6           Active Problems:    Seizure ([address] (10/23/2021)         I spent   40  minutes providing care to this  acutely ill inpatient with > 50% of the time counseling and assisting in the coordination of care of the patient on the patient's hospital floor/unit.              Signed By:  Ya Flores DO FAAN    October 24, 2021

## 2021-10-24 NOTE — PROGRESS NOTES
Spoke with albina last night updated on pt condition. Grandson requested that pt have no visitors. Believes pt would not want others to see her at this time.

## 2021-10-25 PROBLEM — R47.01 APHASIA: Status: ACTIVE | Noted: 2021-01-01

## 2021-10-25 PROBLEM — R54 FRAILTY SYNDROME IN GERIATRIC PATIENT: Status: ACTIVE | Noted: 2021-01-01

## 2021-10-25 NOTE — PROCEDURES
EEG REPORT    Patient Name: Kane Borrego  : 1933  Age: 80 y.o. Ordering physician: Dr. Griselda Raya  Date of EEG: 10/25/2021  7:12-7:33  Diagnosis: status epilepticus  Interpreting physician: Cassie Michael D.O. FAAN    Procedure: EEG    CLINICAL INDICATION: The patient is a 80 y.o. female who is being evaluated for baseline electro cerebral activities and to rule out seizure focus. Current Facility-Administered Medications   Medication Dose Route Frequency    acetaminophen (TYLENOL) suppository 325 mg  325 mg Rectal Q4H PRN    sodium chloride (NS) flush 5-40 mL  5-40 mL IntraVENous Q8H    sodium chloride (NS) flush 5-40 mL  5-40 mL IntraVENous PRN    LORazepam (ATIVAN) injection 2 mg  2 mg IntraVENous Q5MIN PRN    heparin (porcine) injection 5,000 Units  5,000 Units SubCUTAneous Q12H    dextrose 5% - 0.9% NaCl with KCl 20 mEq/L infusion  50 mL/hr IntraVENous CONTINUOUS    clopidogreL (PLAVIX) tablet 75 mg  75 mg Oral DAILY    methIMAzole (TAPAZOLE) tablet 5 mg  5 mg Oral DAILY    atorvastatin (LIPITOR) tablet 40 mg  40 mg Oral QHS    levETIRAcetam (KEPPRA) 1,500 mg in 0.9% sodium chloride 100 mL IVPB  1,500 mg IntraVENous Q12H    hydrALAZINE (APRESOLINE) 20 mg/mL injection 10 mg  10 mg IntraVENous Q6H PRN    metoprolol (LOPRESSOR) injection 2.5 mg  2.5 mg IntraVENous Q6H           DESCRIPTION OF PROCEDURE:     This is a digitally recorded electroencephalogram  Electrodes were applied in accordance with the international 10-20 system of electrode placement. 18 channels of scalp EEG are recorded  A channel was used for EoG  Another channel was used for ECG   The data is stored digitally and reviewed in reformatted montages for optimal display  EEG  was reviewed in both bipolar and referential montages    Description of Activity: The background of this recording contains no well-formed alpha activity seen.   There was a mixture of diffuse theta and delta activity identified throughout the study. There was a small amount of beta activity seen. Throughout the recording, there were no clear areas of focal slowing nor spike or spike-and-wave discharges seen. Hyperventilation was not performed. Photic stimulation produced no response in the posterior head regions. Clinical Interpretation: This EEG is abnormal. There is mild to moderate generalized slowing as seen in encephalopathies. There is no focal asymmetry, seizures or epileptiform discharges seen. Clinical correlation is recommended         MORRIS Desouza

## 2021-10-25 NOTE — PROGRESS NOTES
Problem: Pressure Injury - Risk of  Goal: *Prevention of pressure injury  Description: Document [place] Scale and appropriate interventions in the flowsheet. Outcome: Progressing Towards Goal  Note: Pressure Injury Interventions:  Sensory Interventions: Assess changes in LOC, Avoid rigorous massage over bony prominences, Check visual cues for pain, Discuss PT/OT consult with provider, Float heels, Keep linens dry and wrinkle-free, Maintain/enhance activity level, Minimize linen layers, Monitor skin under medical devices, Turn and reposition approx.  every two hours (pillows and wedges if needed)    Moisture Interventions: Absorbent underpads, Apply protective barrier, creams and emollients, Check for incontinence Q2 hours and as needed, Limit adult briefs, Maintain skin hydration (lotion/cream), Minimize layers, Moisture barrier    Activity Interventions: PT/OT evaluation, Increase time out of bed    Mobility Interventions: PT/OT evaluation    Nutrition Interventions: Document food/fluid/supplement intake, Offer support with meals,snacks and hydration, Discuss nutritional consult with provider    Friction and Shear Interventions: Apply protective barrier, creams and emollients, Lift team/patient mobility team, Minimize layers

## 2021-10-25 NOTE — PROGRESS NOTES
Bedside shift change report given to [facility] [place] (oncoming nurse) by Fox Ortiz (offgoing nurse). Report included the following information SBAR, Kardex, ED Summary, Intake/Output, MAR and Recent Results. Assumed patient care at 3 pm. Prn given for patient's elevated bp.

## 2021-10-25 NOTE — PROGRESS NOTES
Neurology Note    Patient ID:  Jose Collado  [id number]  3 y.o.  8/2/1933      Date of Consultation:  October 25, 2021      Assessment and Plan:    The patient is a pleasant 55-year-old female with history of prior stroke with residual aphasia and right-sided weakness who presents with new onset of seizures which began with status epilepticus. Her examination does reveal aphasia and right-sided weakness. New onset seizures: The etiology of the seizures may be related to prior stroke with new superimposed stroke. EEG with no ongoing seizures  No further seizures   continue Keppra at 1500 mg twice a day. If additional seizure medication is needed,  begin Vimpat at 50 mg twice a day. Acute stroke, with history of prior stroke:  Risk for stroke include hypertension, dyslipidemia, prior stroke. Continue Plavix  Carotid doppler study with no evidence on flow limiting stenosis of preliminary read. Hypertension: Aggressive control with goal systolic blood pressure under 140  Dyslipidemia: Continue lipid-lowering therapy. lipitor 40 mg  Updated hemoglobin A1c and lipid levels were at goal  Continued pt, ot,speech  Given her history of of prior stroke with superimposed new stroke and recent status epilepticus, I would recommend a palliative care consult as the patient is going to have significant neurological impairment. The patient did have notable cognitive impairment prior to admission. Care coordination also should be involved. There is no other additional neurology recommendations at this time. If questions arise, please not hesitate to contact me and I will return to see the patient. Subjective: no verbal output this am       History of Present Illness:   Leanne Song is a 80 y.o. female with a history of hypothyroidism, hypertension, reflux disease and breast cancer who was transferred from [facility] after having a new onset of tonic-clonic seizure activity. She was sitting on her couch at home and the patient stopped talking with her eyes rolled back in her head and developed tonic-clonic seizure activity. The seizure reportedly lasted close to 20 minutes and the patient was given then dose of Versed by EMS and the seizure stopped. She was noted to have a low potassium and elevated lactic acid. Head CT done at [facility] general revealed no abnormalities. She was started on Keppra at the outside hospital. No further seizures over the past 48 hours. The patient did receive her MRI last evening which did reveal an acute stroke. The patient has minimal verbal output this a.m.   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    On C-scope 2008         Past Surgical History:   Procedure Laterality Date    HX APPENDECTOMY      HX BREAST LUMPECTOMY      HX BREAST LUMPECTOMY Left 1998    HX BREAST LUMPECTOMY Right 1999    HX CATARACT REMOVAL Bilateral     HX HEENT      nose surgery after skin cancer removed    HX HEENT      YAG both eyes    HX HYSTERECTOMY  1980    HX LITHOTRIPSY  1979    HX MASTECTOMY Right 2002    HX MASTECTOMY Left     HX OTHER SURGICAL      had left lower rib removed to extract a kidney stone    HX TUBAL LIGATION          Family History   Problem Relation Age of Onset    Cancer Mother         lymphoma    Heart Disease Father     Heart Disease Maternal Grandmother     Diabetes Maternal Grandfather     Heart Disease Paternal Grandmother  Colon Cancer Maternal Aunt     Other Daughter         Overdose    Thyroid Disease Neg Hx         Social History     Tobacco Use    Smoking status: Never Smoker    Smokeless tobacco: Never Used   Substance Use Topics    Alcohol use: No     Alcohol/week: 0.0 standard drinks     Comment: on the holidays may have a mixed        Allergies   Allergen Reactions    Latex Rash    Contrast Agent [Iodine] Unknown (comments) and Hives    Doxycycline Unknown (comments) and Hives    Erythromycin Unknown (comments) and Hives    Macrodantin [Nitrofurantoin Macrocrystalline] Unknown (comments)    Nitrofurantoin Hives    Sulfa (Sulfonamide Antibiotics) Hives     Eyes turned red    Sulfasalazine Hives     Eyes turned red    Tamoxifen Other (comments) and Rash          Current Facility-Administered Medications   Medication Dose Route Frequency    acetaminophen (TYLENOL) suppository 325 mg  325 mg Rectal Q4H PRN    sodium chloride (NS) flush 5-40 mL  5-40 mL IntraVENous Q8H    sodium chloride (NS) flush 5-40 mL  5-40 mL IntraVENous PRN    LORazepam (ATIVAN) injection 2 mg  2 mg IntraVENous Q5MIN PRN    heparin (porcine) injection 5,000 Units  5,000 Units SubCUTAneous Q12H    dextrose 5% - 0.9% NaCl with KCl 20 mEq/L infusion  50 mL/hr IntraVENous CONTINUOUS    clopidogreL (PLAVIX) tablet 75 mg  75 mg Oral DAILY    methIMAzole (TAPAZOLE) tablet 5 mg  5 mg Oral DAILY    atorvastatin (LIPITOR) tablet 40 mg  40 mg Oral QHS    levETIRAcetam (KEPPRA) 1,500 mg in 0.9% sodium chloride 100 mL IVPB  1,500 mg IntraVENous Q12H    hydrALAZINE (APRESOLINE) 20 mg/mL injection 10 mg  10 mg IntraVENous Q6H PRN    metoprolol (LOPRESSOR) injection 2.5 mg  2.5 mg IntraVENous Q6H         Review of Systems:    [x]Unable to obtain  ROS due to  [x]mental status change  []sedated   []intubated    Objective:     Visit Vitals  BP (!) 171/98 (BP 1 Location: Right upper arm, BP Patient Position: At rest;Lying)   Pulse 98   Temp 98.5 °F (36.9 °C)   Resp 20   SpO2 94%       Physical Exam:    General:  appears well nourished in no acute distress  Neck: no carotid bruits  Lungs: clear to auscultation  Heart:  no murmurs, regular rate  Lower extremity: peripheral pulses palpable and no edema  Skin: intact    Neurological exam:    Arousable. She would not follow commands. She had minimal verbal output today. She would continue to  moan in discomfort. Cranial nerves:   II-XII were tested    Perrrla  Visual huston were full. She does blink to visual threat. There is a mild right gaze preference  Eomi, no evidence of nystagmus  Facial sensation:  normal and symmetric  Facial motor: Right facial droop  Hearing intact  SCM strength intact  Tongue: midline without fasciculations    Motor:   No evidence of fasciculations    Strength testing:  She cannot participate fully with motor testing but the right hemiparesis continues to be present    Sensory:  Withdraws to painful stimulation x all 4    Reflexes:    Right Left  Biceps  2 2  Triceps 2 2  Brachiorad. 2 2  Patella  2 2  Achilles - -    Plantar response: Extensor on the right    Cerebellar testing:  no tremor apparent     Labs:     Lab Results   Component Value Date/Time    Hemoglobin A1c 5.3 10/24/2021 11:28 AM    Sodium 139 10/25/2021 12:02 AM    Potassium 3.3 (L) 10/25/2021 12:02 AM    Chloride 110 (H) 10/25/2021 12:02 AM    Glucose 123 (H) 10/25/2021 12:02 AM    BUN 21 (H) 10/25/2021 12:02 AM    Creatinine 1.01 10/25/2021 12:02 AM    Calcium 10.7 (H) 10/25/2021 12:02 AM    WBC 11.0 10/25/2021 12:02 AM    HCT 33.4 (L) 10/25/2021 12:02 AM    HGB 11.1 (L) 10/25/2021 12:02 AM    PLATELET 177 18/27/9231 12:02 AM       Imaging:    Results from Hospital Encounter encounter on 07/23/18    MRI BRAIN WO CONT    Narrative      EXAM:  MRI BRAIN WO CONT    INDICATION:    possible stroke    COMPARISON:  CT head 7/23/2018. CONTRAST: None.     TECHNIQUE:  Multiplanar multisequence acquisition without contrast of the brain. FINDINGS:  Acute cortical/subcortical infarcts involving the left parieto-occipital lobe  and left posterior insula, and additional acute infarct in the deep white matter  of the left frontoparietal lobe. There is no significant mass effect. No  evidence of acute hemorrhage. Mild generalized parenchymal volume loss with commensurate dilation of the sulci  and ventricular system. Scattered periventricular and deep white matter T2/FLAIR  hyperintensities, consistent with mild chronic microvascular ischemic disease. Small chronic microhemorrhage in the right thalamus. There is no cerebellar  tonsillar herniation. Expected arterial flow-voids are present. The paranasal sinuses, mastoid air cells, and middle ears are clear. The orbital  contents are within normal limits. No significant osseous or scalp lesions are  identified. Impression  IMPRESSION:  1. Acute infarcts in the left frontoparietal deep white matter, posterior  insula, and parieto-occipital lobe. No mass effect. No acute intracranial  hemorrhage. Results from [place] encounter on 10/22/21    CT HEAD WO CONT    Narrative  INDICATION: seizure    Exam: Noncontrast CT of the brain is performed with 5 mm collimation. CT dose reduction was achieved with the use of the standardized protocol  tailored for this examination and automatic exposure control for dose  modulation. Direct comparison is made to CT dated August 2018. FINDINGS: There is left parieto-occipital encephalomalacia, unchanged. There is  no evidence of acute territorial infarct. There is no acute intracranial  hemorrhage, mass, mass effect or herniation. Ventricular system is normal.  The  mastoid air cells are well pneumatized. The visualized paranasal sinuses are  normal.    Impression  Stable left parieto-occipital encephalomalacia. No acute  intracranial hemorrhage, mass or infarct. head CT from October 22, 2021.   There was atrophy and significant left parietal occipital encephalomalacia. Creatinine 0.9, liver function normal , albumin 2.6    LDL 14  Hemoglobin A1c 5.3. I did independently review the brain MRI from October 24, 2021. There is acute on chronic infarct in the left posterior superior temporal lobe. She did have chronic small vessel ischemic disease and atrophy. Active Problems:    Seizure ([facility] (10/23/2021)         I spent   35  minutes providing care to this  acutely ill inpatient with > 50% of the time counseling and assisting in the coordination of care of the patient on the patient's hospital floor/unit.              Signed By:  Kt Tanner DO FAAN    October 25, 2021

## 2021-10-25 NOTE — PROGRESS NOTES
Hospitalist Progress Note    NAME: Leanne Cortés   :  1933   MRN:  [id number]       Assessment / Plan:  Seizures  Hx of stroke with residual right sided hemiparesis and aphasia  HTN, uncontrolled  HLD  Neurology evals on going   Continue with Keppra 1500mg BID due to witnessed seizure this morning  MRI Brain pending  EEG pending  Decrease IVF rate  IV Hydralazine PRN  Schedule IV Metoprolol q6hrs  Unclear when pt will be able to tolerate PO reliably    10/24:  Bedside swallow eval  Continue IV Keppra  MRI Brain, EEG pending  NPO till safe to swallow  Neuro evals appreciated    10/25:  Acute CVA  Continue with IV Keppra  EEG was abnormal  Appreciate Neurology evals    Brain MRI: IMPRESSION  1. Acute on chronic infarct in the left posterior/superior temporal lobe. 2. Chronic infarct of the left occipital lobe. 3. Chronic small vessel ischemic disease. Age-related cerebral volume loss. As per discussion within [facility] today, ? APS involvement. Further information needs to be obtained regarding pt's social status and safety at home if discharged there. Palliative evals requested as well. Fever  Procal negative  Follow fever curve. Hold ABX for now. CXR negative. UA pending. ?central fever      Estimated discharge date:   Barriers:    Code status: Full  Prophylaxis: Hep SQ  Recommended Disposition: TBD     Subjective:     Chief Complaint / Reason for Physician Visit  Aphasic. Discussed with RN events overnight. Review of Systems:  Symptom Y/N Comments  Symptom Y/N Comments   Fever/Chills    Chest Pain     Poor Appetite    Edema     Cough    Abdominal Pain     Sputum    Joint Pain     SOB/MCKEON    Pruritis/Rash     Nausea/vomit    Tolerating PT/OT     Diarrhea    Tolerating Diet     Constipation    Other       Could NOT obtain due to:      Objective:     VITALS:   Last 24hrs VS reviewed since prior progress note.  Most recent are:  Patient Vitals for the past 24 hrs:   Temp Pulse Resp BP SpO2 10/25/21 1151 97.7 °F (36.5 °C) 90 18 (!) 170/113 94 %   10/25/21 0730 98.5 °F (36.9 °C) 98 20 (!) 171/98 94 %   10/25/21 0314 (!) 100.5 °F (38.1 °C) (!) 101 16 (!) 150/75 91 %   10/24/21 1935 (!) 100.6 °F (38.1 °C) 100 16 (!) 173/96 96 %   10/24/21 1605 100.4 °F (38 °C) 91 16 (!) 169/78 98 %   10/24/21 1600  97      10/24/21 1355 100.2 °F (37.9 °C) (!) 104 16 (!) 143/90 98 %       Intake/Output Summary (Last 24 hours) at 10/25/2021 1340  Last data filed at 10/25/2021 1151  Gross per 24 hour   Intake    Output 100 ml   Net -100 ml        I had a face to face encounter and independently examined this patient on 10/25/2021, as outlined below:  PHYSICAL EXAM:  General: Awake   EENT:  EOMI. Anicteric sclerae. MMM  Resp:  CTA bilaterally, no wheezing or rales. No accessory muscle use  CV:  Regular  rhythm,  No edema  GI:  Soft, Non distended, Non tender. +Bowel sounds  Neurologic:  Aphasic, does not follow commands, right sided hemiparesis  Psych:   Poor insight  Skin:  No rashes. No jaundice    Reviewed most current lab test results and cultures  YES  Reviewed most current radiology test results   YES  Review and summation of old records today    NO  Reviewed patient's current orders and MAR    YES  PMH/SH reviewed - no change compared to H&P  ________________________________________________________________________  Care Plan discussed with:    Comments   Patient x    Family      RN x    Care Manager     Consultant  x                      Multidiciplinary team rounds were held today with , nursing, pharmacist and clinical coordinator. Patient's plan of care was discussed; medications were reviewed and discharge planning was addressed.      ________________________________________________________________________  Total NON critical care TIME:  35   Minutes    Total CRITICAL CARE TIME Spent:   Minutes non procedure based      Comments   >50% of visit spent in counseling and coordination of care ________________________________________________________________________  Laura Tolentino MD     Procedures: see electronic medical records for all procedures/Xrays and details which were not copied into this note but were reviewed prior to creation of Plan. LABS:  I reviewed today's most current labs and imaging studies.   Pertinent labs include:  Recent Labs     10/25/21  0002 10/23/21  0626 10/22/21  2008   WBC 11.0 11.2* 6.6   HGB 11.1* 12.3 11.6   HCT 33.4* 38.3 36.6    209 245     Recent Labs     10/25/21  0002 10/23/21  0626 10/22/21  2008    138 144   K 3.3* 3.8 3.3*   * 110* 106   CO2 22 23 34*   * 102* 108*   BUN 21* 12 15   CREA 1.01 0.92 1.37*   CA 10.7* 10.3* 10.1   MG 1.7  --  1.7   ALB  --  2.6* 2.9*   TBILI  --  1.4* 1.1*   ALT  --  16 9*       Signed: Laura Tolentino MD

## 2021-10-25 NOTE — PROGRESS NOTES
Spiritual Care Assessment/Progress Note  [facility]      NAME: Mirella Hanson      MRN: [id number]  AGE: 80 y.o. SEX: female  Faith Affiliation: Yazidi   Language: English     10/25/2021     Total Time (in minutes): 10     Spiritual Assessment begun in [facility] through conversation with:         []Patient        [] Family    [] Friend(s)        Reason for Consult: Palliative Care, Initial/Spiritual Assessment     Spiritual beliefs: (Please include comment if needed)     [] Identifies with a manjinder tradition:         [] Supported by a manjinder community:            [] Claims no spiritual orientation:           [] Seeking spiritual identity:                [] Adheres to an individual form of spirituality:           [x] Not able to assess:                           Identified resources for coping:      [] Prayer                               [] Music                  [] Guided Imagery     [] Family/friends                 [] Pet visits     [] Devotional reading                         [x] Unknown     [] Other:                                       Interventions offered during this visit: (See comments for more details)    Patient Interventions: Initial visit           Plan of Care:     [x] Support spiritual and/or cultural needs    [] Support AMD and/or advance care planning process      [] Support grieving process   [] Coordinate Rites and/or Rituals    [] Coordination with community clergy   [] No spiritual needs identified at this time   [] Detailed Plan of Care below (See Comments)  [] Make referral to Music Therapy  [] Make referral to Pet Therapy     [] Make referral to Addiction services  [] Make referral to [facility]  [] Make referral to Spiritual Care Partner  [] No future visits requested        [x] Follow up upon further referrals     Comments:  Reviewed chart prior to visit on Neuro unit for spiritual assessment. No family/friends present.  Patient appeared to be sleeping soundly;  did not attempt to wake her. Unable to assess for spiritual needs or concerns at this time.        DEVAUGHN Ritter, [facility], Staff [address]    [address] Paging Service  [phone])

## 2021-10-25 NOTE — PROGRESS NOTES
Chart reviewed. CM attempted to reach daughter/mPOA, Bello Pardo, to complete assessment this evening. Left voicemail and awaiting response. CM then made attempt to contact grandson with no response. Will re-attempt contact at a later time.     QAMAR Felipe   Care Manager, [facility]  [phone]

## 2021-10-25 NOTE — PROGRESS NOTES
Bedside shift change report given to Rizawna Iqbal RN (oncoming nurse) by Emmanuel Dueñas RN (offgoing nurse). Report included the following information SBAR, Kardex, Intake/Output and MAR.

## 2021-10-25 NOTE — CONSULTS
Palliative Medicine Consult  Agustin: [phone] ([id number])    Patient Name: Jostin Ramírez  YOB: 1933    Date of Initial Consult: 10/25/21  Reason for Consult: Care Decisions  Requesting Provider: Breezy Hernández MD  Primary Care Physician: Amanda Richardson MD     SUMMARY:   Jostin Ramírez is a 80 y.o. with a past history of stroke w/ aphasia R sided weakness, htn, hypothyroidism, HLD, breast CA w/ L mastectomy who was admitted on 10/23/2021 from home with a diagnosis of tonic clonic seizure. Current medical issues leading to Palliative Medicine involvement include: care decisions, and goals of care. Social:Lives with grandson Elpidio Mckenzie in [place]. PALLIATIVE DIAGNOSES:   1. Goals Of Care  2. Fraility  3. Debility  4. Aphasia  5. Emotional Support       PLAN:   1. Prior to visit, I completed an extensive review of patient's medical records, including medical documentation, vital signs, MARs, and results of various labs and other diagnostics. 2. Examined patient in room, she was alert and responded to her name by making eye contact. She was lying in bed unable to fully verbally communicate other than repeating same sentence which seemed mostly incomprehensible. She was seemingly tearful however unable to communicate her feelings. I reached out to her grandson Elpidio Mckenzie via phone who answered and after introducing myself to him he asked \"can I call you back? \" I stated sure can I give you my call back number. ..when I heard a click then silence. I also called the daughter Reid Emanuel who is listed as her POA and healthcare agent and left a voicemail for her to call me back this was her cell- [phone], called home phone [phone] no answer. Patient had a short period of jerking motions upon turning her for a skin assessment that calmed down soon after. As of [phone] still awaiting a call back from daughter. 3. Will continue to follow.    4. Initial consult note routed to primary continuity provider and/or primary health care team members spoke with Preston Smith, [facility]. 5.Communicated plan of care with: Palliative Karli STANLEY 192 Team     GOALS OF CARE / TREATMENT PREFERENCES:     GOALS OF CARE:       TREATMENT PREFERENCES:   Code Status: Full Code    Patient's personal goals include: unable to discuss patient aphasic    Important upcoming milestones or family events: unable to discuss patient aphasic    The patient identifies the following as important for living well: unable to discuss patient aphasic      Advance Care Planning:  [x] [facility] Interdisciplinary Team has updated the ACP Navigator with [address] and Patient Capacity      Primary Decision Maker (Active): Olena Raymundo - [address] Planning 6/14/2021   Patient's Healthcare Decision Maker is: Legal Next of M[address] Directive Yes, on file   Does the patient have other document types -               Other:    As far as possible, the palliative care team has discussed with patient / health care proxy about goals of care / treatment preferences for patient. HISTORY:     History obtained from: Chart    CHIEF COMPLAINT: Seizure    HPI/SUBJECTIVE:    The patient is: 81 yo  female with a past medical history of stroke with aphasia R sided weakness, HTN, hypothyroidism, HLD, breast CA with L mastectomy who presented to the ED as a transfer from [facility] after having a new onset of tonic-clonic seizure activity. She was sitting on her couch at home and the patient stopped talking with her eyes rolled back in her head and developed tonic-clonic seizure activity. The seizure reportedly lasted close to 20 minutes and the patient was given then dose of Versed by EMS and the seizure stopped. She was noted to have a low potassium and elevated lactic acid. Head CT done [facility] revealed no abnormalities.   She was started on Keppra at the outside hospital.   [] Verbal and participatory  [x] Non-participatory due to:   Aphasia    Clinical Pain Assessment (nonverbal scale for severity on nonverbal patients):   Clinical Pain Assessment  Severity: 4     Activity (Movement): Lying quietly, normal position    Duration: for how long has pt been experiencing pain (e.g., 2 days, 1 month, years)  Frequency: how often pain is an issue (e.g., several times per day, once every few days, constant)     FUNCTIONAL ASSESSMENT:     Palliative Performance Scale (PPS):  PPS: 30       PSYCHOSOCIAL/SPIRITUAL SCREENING:     Palliative IDT has assessed this patient for cultural preferences / practices and a referral made as appropriate to needs (Cultural Services, Patient Advocacy, Ethics, etc.)    Any spiritual / Confucianist concerns:  [] Yes /  [x] No    Caregiver Burnout:  [] Yes /  [x] No /  [] No Caregiver Present      Anticipatory grief assessment:   [x] Normal  / [] Maladaptive       ESAS Anxiety: Anxiety: 4    ESAS Depression: Depression: 0        REVIEW OF SYSTEMS:     Positive and pertinent negative findings in ROS are noted above in HPI. The following systems were [x] reviewed / [] unable to be reviewed as noted in HPI  Other findings are noted below. Systems: constitutional, ears/nose/mouth/throat, respiratory, gastrointestinal, genitourinary, musculoskeletal, integumentary, neurologic, psychiatric, endocrine. Positive findings noted below. Modified ESAS Completed by: provider   Fatigue: 3 Drowsiness: 2   Depression: 0 Pain: 4   Anxiety: 4 Nausea: 0   Anorexia: 4 Dyspnea: 0           Stool Occurrence(s): 1        PHYSICAL EXAM:     From RN flowsheet:  Wt Readings from Last 3 Encounters:   10/22/21 120 lb (54.4 kg)   09/13/21 118 lb 6.4 oz (53.7 kg)   03/26/21 130 lb (59 kg)     Blood pressure (!) 170/113, pulse 90, temperature 97.7 °F (36.5 °C), resp. rate 18, SpO2 94 %.     Pain Scale 1: FACES                      Constitutional: Alert, resting in bed, NAD  Eyes: PERRL, anicteric, not able perform accomodation exam  ENMT: no nasal discharge, moist mucous membranes  Cardiovascular: regular rhythm, distal pulses intact  Respiratory: breathing not labored, symmetric, Lungs CTA all lung fields  Gastrointestinal: soft non-tender, +bowel sounds  Musculoskeletal: no deformity, no tenderness to palpation  Skin: warm, dry  Neurologic: Alert and oriented x1 responds to name, not following commands, moving left arm and left leg involuntarily, aphasic         HISTORY:     Active Problems:    Seizure ([address] (10/23/2021)      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    On C-scope 2008       Past Surgical History:   Procedure Laterality Date    HX APPENDECTOMY      HX BREAST LUMPECTOMY      HX BREAST LUMPECTOMY Left 1998    HX BREAST LUMPECTOMY Right 1999    HX CATARACT REMOVAL Bilateral     HX HEENT      nose surgery after skin cancer removed    HX HEENT      YAG both eyes    HX HYSTERECTOMY  1980    HX LITHOTRIPSY  1979    HX MASTECTOMY Right 2002    HX MASTECTOMY Left     HX OTHER SURGICAL      had left lower rib removed to extract a kidney stone    HX TUBAL LIGATION        Family History   Problem Relation Age of Onset    Cancer Mother         lymphoma    Heart Disease Father     Heart Disease Maternal Grandmother     Diabetes Maternal Grandfather     Heart Disease Paternal Grandmother     Colon Cancer Maternal Aunt     Other Daughter         Overdose    Thyroid Disease Neg Hx History reviewed, no pertinent family history.   Social History     Tobacco Use    Smoking status: Never Smoker    Smokeless tobacco: Never Used   Substance Use Topics    Alcohol use: No     Alcohol/week: 0.0 standard drinks     Comment: on the holidays may have a mixed     Allergies   Allergen Reactions    Latex Rash    Contrast Agent [Iodine] Unknown (comments) and Hives    Doxycycline Unknown (comments) and Hives    Erythromycin Unknown (comments) and Hives    Macrodantin [Nitrofurantoin Macrocrystalline] Unknown (comments)    Nitrofurantoin Hives    Sulfa (Sulfonamide Antibiotics) Hives     Eyes turned red    Sulfasalazine Hives     Eyes turned red    Tamoxifen Other (comments) and Rash      Current Facility-Administered Medications   Medication Dose Route Frequency    acetaminophen (TYLENOL) suppository 325 mg  325 mg Rectal Q4H PRN    sodium chloride (NS) flush 5-40 mL  5-40 mL IntraVENous Q8H    sodium chloride (NS) flush 5-40 mL  5-40 mL IntraVENous PRN    LORazepam (ATIVAN) injection 2 mg  2 mg IntraVENous Q5MIN PRN    heparin (porcine) injection 5,000 Units  5,000 Units SubCUTAneous Q12H    dextrose 5% - 0.9% NaCl with KCl 20 mEq/L infusion  50 mL/hr IntraVENous CONTINUOUS    clopidogreL (PLAVIX) tablet 75 mg  75 mg Oral DAILY    methIMAzole (TAPAZOLE) tablet 5 mg  5 mg Oral DAILY    atorvastatin (LIPITOR) tablet 40 mg  40 mg Oral QHS    levETIRAcetam (KEPPRA) 1,500 mg in 0.9% sodium chloride 100 mL IVPB  1,500 mg IntraVENous Q12H    hydrALAZINE (APRESOLINE) 20 mg/mL injection 10 mg  10 mg IntraVENous Q6H PRN    metoprolol (LOPRESSOR) injection 2.5 mg  2.5 mg IntraVENous Q6H          LAB AND IMAGING FINDINGS:     Lab Results   Component Value Date/Time    WBC 11.0 10/25/2021 12:02 AM    HGB 11.1 (L) 10/25/2021 12:02 AM    PLATELET 148 59/91/8986 12:02 AM     Lab Results   Component Value Date/Time    Sodium 139 10/25/2021 12:02 AM    Potassium 3.3 (L) 10/25/2021 12:02 AM Chloride 110 (H) 10/25/2021 12:02 AM    CO2 22 10/25/2021 12:02 AM    BUN 21 (H) 10/25/2021 12:02 AM    Creatinine 1.01 10/25/2021 12:02 AM    Calcium 10.7 (H) 10/25/2021 12:02 AM    Magnesium 1.7 10/25/2021 12:02 AM    Phosphorus 3.2 10/12/2020 01:08 PM      Lab Results   Component Value Date/Time    Alk. phosphatase 77 10/23/2021 06:26 AM    Protein, total 6.4 10/23/2021 06:26 AM    Albumin 2.6 (L) 10/23/2021 06:26 AM    Globulin 3.8 10/23/2021 06:26 AM     Lab Results   Component Value Date/Time    INR 1.0 07/23/2018 02:30 PM    Prothrombin time 9.9 07/23/2018 02:30 PM    aPTT 22.6 07/23/2018 02:30 PM      No results found for: IRON, FE, TIBC, IBCT, PSAT, FERR   No results found for: PH, PCO2, PO2  No components found for: [address]   Lab Results   Component Value Date/Time     (H) 10/23/2021 06:26 AM                Total time: 75  Counseling / coordination time, spent as noted above: 65  > 50% counseling / coordination?: yes     Prolonged service was provided for  []30 min   []75 min in face to face time in the presence of the patient, spent as noted above. Time Start:   Time End:   Note: this can only be billed with 15164 (initial) or 50854 (follow up). If multiple start / stop times, list each separately.
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
## 2021-10-26 NOTE — PROGRESS NOTES
Transition of Care Plan:    RUR: 11  Disposition: SNF  Follow up appointments: PCP  DME needed: Pt has access to RW   Transportation at [address] or means to access home: Pt's grandson has access to keys       IM Medicare Letter: Will provide upon d/c  Is patient a BCPI-A Bundle:     No      If yes, was Bundle Letter given?:     Caregiver Contact: Yes pt's daughter and Marlon Coffman   Discharge Caregiver contacted prior to discharge? Yes    Reason for Admission:  Seizure                     RUR Score:     11              Plan for utilizing home health:      Yes if needed    PCP: First and Last name:  Daniel Tyson MD     Name of Practice:    Are you a current patient: Yes/No: Yes   Approximate date of last visit: September    Can you participate in a virtual visit with your PCP:                     Current Advanced Directive/Advance Care Plan: Full Code      Healthcare Decision Maker:   Click here to complete [address] including selection of the Healthcare Decision Maker Relationship (ie \"Primary\")             Primary Decision Maker (Active): Niles Sammy - Daughter - [phone]                  Transition of Care Plan:      SNF    Care Management Interventions  PCP Verified by CM:  Yes  Transition of Care Consult (CM Consult): Discharge Planning (As per pt's daughter/mpoa pt had multiple SNF/Ip rehab admissions including [address] )  Support Systems: Other Family Member(s) ([facility] is staying with her grandson Karrie Barbour [phone])  Confirm Follow Up Transport: Family  Discharge Location  Discharge Placement: [place] (ACP) Conversation      Date of Conversation: 10/22/2021  Conducted with: Patient with Decision Making Capacity and Healthcare Decision Maker: Next of Kin by law (only applies in absence of a Healthcare Power of  or Legal Guardian)    Healthcare Decision Maker:     Primary Decision Maker (Active): Beto Reilly - Daughter - [phone]  Click here to complete [address] including selection of the Healthcare Decision Maker Relationship (ie \"Primary\")          Content/Action Overview: Has ACP document(s) on file - reflects the patient's care preferences  Reviewed DNR/DNI and patient elects Full Code (Attempt Resuscitation)    Length of Voluntary ACP Conversation in minutes:  16 minutes    Wendy Sanchez received a return call from pt's daughter Harinder who is her assigned MPOA. Harinder said she is living in [place] and she didn't know about pt's hospitalization. Pt is staying with her grandson Adithya Bran . Sounded to be family is not communicate each other about pt's care . As per harley pt's PCP who knows much about which rehab and [facility] providers who provided services for pt. Pearljerri was given floor RN unit number to get current pt's status. Pearljerri was given pt's floor cm contact number.      Pharmacy- [address] [facility]  ED Case Manager   Ext -[phone]

## 2021-10-26 NOTE — PROGRESS NOTES
End of Shift Note    Bedside shift change report given to Kaylan Lanier (oncoming nurse) by Raquel Cooper RN (offgoing nurse). Report included the following information SBAR, ED Summary, Recent Results and Med Rec Status    Shift worked:  2942-0770   Shift summary and any significant changes:     DO NOT GIVE ANY INFORMATION OR ACCESS TO GRANDSON, OR ANYONE OTHER THAN DAUGHTER. Pt is a confidential pt. Pt daughter Annelise and hert  are the only people allowed to visitor.        Concerns for physician to address:  NONE   [facility] phone for oncoming shift:   [phone]     Patient Information  Leanne Farmer  80 y.o.  10/23/2021  5:02 AM by Mallika Pérez MD. Camino Done was admitted from Home    Problem List  Patient Active Problem List    Diagnosis Date Noted    Goals of care, counseling/discussion     Frailty     Debility     Need for emotional support     Frailty syndrome in geriatric patient 10/25/2021    Aphasia 10/25/2021    Seizure ([address] 10/23/2021    Mixed hyperlipidemia 07/31/2020    History of breast cancer 03/12/2020    CKD (chronic kidney disease) stage 3, GFR 30-59 ml/min ([address] 03/12/2020    Stroke (cerebrum) ([address] 07/23/2018    S/P left mastectomy 01/06/2017    Toxic multinodular goiter 06/17/2016    Osteoporosis 02/22/2016    Hypercalcemia 10/26/2015    Encounter for long-term (current) drug use 10/21/2015    Constipation     Hypertension     GERD (gastroesophageal reflux disease)     Skin cancer of nose      Past Medical History:   Diagnosis Date    Breast cancer ([address]     Constipation     First degree AV block     GERD (gastroesophageal reflux disease)     HTN (hypertension)     Hypercalcemia 10/26/2015    Hyperparathyroidism ([address]     Hypertension     Hypopotassemia     IGT (impaired glucose tolerance)     Neuropathy, diabetic ([address]     Osteoporosis     2008 Elbow Fx    Other and unspecified hyperlipidemia     Pneumonia 2006    RLL    Skin cancer of nose  Toxic multinodular goiter     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    On C-scope 2008          Activity:  Activity Level: Bed Rest  Number times ambulated in hallways past shift: 0  Number of times OOB to chair past shift: 0    Cardiac:   Cardiac Monitoring: Yes      Cardiac Rhythm: Sinus Rhythm    Access:   Current line(s): PIV     Genitourinary:   Urinary status: incontinent      Respiratory:   O2 Device: None (Room air)  Chronic home O2 use?: NO  Incentive spirometer at bedside: NO       GI:  Last Bowel Movement Date: 10/25/21  Current diet:  DIET NPO  Passing flatus: YES  Tolerating current diet: unable to assess       Pain Management:   Patient states pain is manageable on current regimen: NO    Skin:  Fernando Score: 11  Interventions: PT/OT consult, limit briefs and internal/external urinary devices    Patient Safety:  Fall Score:  Total Score: 3  Interventions: bed/chair alarm and gripper socks  High Fall Risk: Yes  @Rollbelt  @dexterity to release roll belt  Yes/No ( must document dexterity  here by stating Yes or No here, otherwise this is a restraint and must follow restraint documentation policy.)    DVT prophylaxis:  DVT prophylaxis Med- Yes      Wounds: (If Applicable)  Wounds- No  Location     Active Consults:  IP CONSULT TO NEUROLOGY  IP CONSULT TO PALLIATIVE CARE - PROVIDER    Length of Stay:  Expected LOS: 2d 21h  Actual LOS: 3  Discharge Plan: MICHELE Motley RN
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
## 2021-10-26 NOTE — PROGRESS NOTES
Spiritual Care Assessment/Progress Note  [facility]      NAME: Nick Bonilla      MRN: [id number]  AGE: 80 y.o. SEX: female  Anabaptist Affiliation: Religion   Language: English     10/26/2021     Total Time (in minutes): 10     Spiritual Assessment begun in [facility]LE[facility] through conversation with:         []Patient        [] Family    [] Friend(s)        Reason for Consult: Palliative Care, Initial/Spiritual Assessment     Spiritual beliefs: (Please include comment if needed)     [] Identifies with a manjinder tradition:         [] Supported by a manjinder community:            [] Claims no spiritual orientation:           [] Seeking spiritual identity:                [] Adheres to an individual form of spirituality:           [x] Not able to assess:                           Identified resources for coping:      [] Prayer                               [] Music                  [] Guided Imagery     [] Family/friends                 [] Pet visits     [] Devotional reading                         [x] Unknown     [] Other:                                              Interventions offered during this visit: (See comments for more details)    Patient Interventions: Initial visit     Family/Friend(s): Initial Assessment     Plan of Care:     [] Support spiritual and/or cultural needs    [] Support AMD and/or advance care planning process      [] Support grieving process   [] Coordinate Rites and/or Rituals    [] Coordination with community clergy   [] No spiritual needs identified at this time   [] Detailed Plan of Care below (See Comments)  [] Make referral to Music Therapy  [] Make referral to Pet Therapy     [] Make referral to Addiction services  [] Make referral to [facility]  [] Make referral to Spiritual Care Partner  [] No future visits requested        [x] Follow up upon further referrals     Comments:     Attempted to perform Initial PAlliative Care assessment for Ms. Ayala Moshe in 3105. Upon arrival, pt was appeared sleeping.  made a care call for Pt's grandson, Mr. Romel Hercules. He did not answer 's call. Unable to assess.      [address] [facility] Haresh Perkins,ThIvonneM, Sabrina 603 Provider   Paging Service [phone])
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
## 2021-10-26 NOTE — TELEPHONE ENCOUNTER
----- Message from Evelyn Whiteside sent at 10/26/2021  8:11 AM EDT -----  Subject: Message to Provider    QUESTIONS  Information for Provider? Patient daughter Aleida Jane calling about [address] was put in Oct 23 she is altered   state. She wants a call back asap. daughter needs a call concerned. ---------------------------------------------------------------------------  --------------  Eh Blocker INFO  What is the best way for the office to contact you? OK to leave message on   voicemail  Preferred Call Back Phone Number? [phone]  ---------------------------------------------------------------------------  --------------  SCRIPT ANSWERS  Relationship to Patient? Third Party  Representative Name?  Aleida Jane

## 2021-10-26 NOTE — PROGRESS NOTES
CM made attempt to complete CM initial assessment was unsuccessful. Left vm to pt's Mpoa and  daughter Bello Pardo , requested return call. Floor Cm contact number is also provided in voice mail.     Gladys Poster MSW  ED Case Manager   Ext -[phone]
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
## 2021-10-27 NOTE — PROGRESS NOTES
Problem: Pressure Injury - Risk of  Goal: *Prevention of pressure injury  Description: Document Fernando Scale and appropriate interventions in the flowsheet. Outcome: Progressing Towards Goal  Note: Pressure Injury Interventions:  Sensory Interventions: Assess changes in LOC    Moisture Interventions: Absorbent underpads    Activity Interventions: Pressure redistribution bed/mattress(bed type)    Mobility Interventions: Turn and reposition approx. every two hours(pillow and wedges)    Nutrition Interventions: Document food/fluid/supplement intake    Friction and Shear Interventions: Minimize layers                Problem: Patient Education: Go to Patient Education Activity  Goal: Patient/Family Education  Outcome: Progressing Towards Goal     Problem: Seizure Disorder (Adult)  Goal: *STG: Remains free of seizure activity  Outcome: Progressing Towards Goal  Goal: *STG: Maintains lab values within therapeutic range  Outcome: Progressing Towards Goal  Goal: *STG/LTG: Complies with medication therapy  Outcome: Progressing Towards Goal  Goal: *STG: Remains free of injury during seizure activity  Outcome: Progressing Towards Goal  Goal: *STG: Remains safe in hospital  Outcome: Progressing Towards Goal  Goal: Interventions  Outcome: Progressing Towards Goal     Problem: Patient Education: Go to Patient Education Activity  Goal: Patient/Family Education  Outcome: Progressing Towards Goal     Problem: Falls - Risk of  Goal: *Absence of Falls  Description: Document Agustin Fall Risk and appropriate interventions in the flowsheet.   Outcome: Progressing Towards Goal  Note: Fall Risk Interventions:  Mobility Interventions: Bed/chair exit alarm    Mentation Interventions: Bed/chair exit alarm    Medication Interventions: Assess postural VS orthostatic hypotension, Bed/chair exit alarm    Elimination Interventions: Call light in reach, Bed/chair exit alarm, Patient to call for help with toileting needs    History of Falls Interventions: Bed/chair exit alarm         Problem: Patient Education: Go to Patient Education Activity  Goal: Patient/Family Education  Outcome: Progressing Towards Goal

## 2021-10-27 NOTE — PROGRESS NOTES
Pt's order set changed to comfort measures only, per family request. Manuela Peres came to the room and prayed with the family. Pt resting comfortably at this time. Family has left for the night to return home (6 hours away) and will return another day. Will continue to monitor pt closely and provide care for maximum comfort.

## 2021-10-27 NOTE — PROGRESS NOTES
Transfer orders in for patient. Spoke with Bed Placement. Pt will be going to Oncology Unit bed 1142. Room is currently being prepared and will be ready around 7pm.    Called [phone] to give report (SBAR, Recent Results, and MAR) to receiving nurse. Waited on hold for 15 minutes. Will try to call again later.

## 2021-10-27 NOTE — PROGRESS NOTES
Palliative Medicine Consult  Vamsi: [phone] ([phone])    Patient Name: Kraig Quarles  YOB: 1933    Date of Initial Consult: 10/25/21  Reason for Consult: Care Decisions  Requesting Provider: Tea Henry MD  Primary Care Physician: Aida Ward MD     SUMMARY:   Kraig Quarles is a 80 y.o. with a past history of stroke w/ aphasia R sided weakness, htn, hypothyroidism, HLD, breast CA w/ L mastectomy who was admitted on 10/23/2021 from home with a diagnosis of tonic clonic seizure. Current medical issues leading to Palliative Medicine involvement include: care decisions, and goals of care. 10/26/21: Resting in bed, sounds like she is whining but is incomprehensible with words. Keppra IV infusing. 10/27/21: Resting in bed crying and moaning intermittently. Patient moved to a new room yesterday afternoon and made private due to situation with albina Wilson. Social:Lives with albina Wilson in [facility]. PALLIATIVE DIAGNOSES:   1. Goals Of Care  2. Fraility  3. Debility  4. Aphasia  5. Emotional Support to family and patient       PLAN:   1. Prior to visit, I completed an extensive review of patient's medical records, including medical documentation, vital signs, MARs, and results of various labs and other diagnostics. 2. Examined patient in room this morning, dtr Lois at bedside. Patient lying in bed moaning and crying out intermittently for no obvious reason and patient unable to verbalize. Had a lengthy conversation with daughter regarding her mothers prognosis moving forward. She understands the likelihood of her mother returning to her baseline and returning home is very low. She agrees that she would not want her mother to suffer or have heroic measures performed to prolong the inevitable with her current medical condition. She is requesting to speak with Neurology so reached out to Dr. Remigio Reyez who agreed to meet with her between 3-5.  Discussed with daughter the option of transitioning to comfort care and possibly hospice. She plans to make a decision on those measures once she has a conversation with Neurology. As for now she is agreeable that we give her mother morphine SL to comfort her and remove the IV that infiltrated and not restart for now until she makes that decision. She is overwhelmed and tearful at this time. Also spoke to Magdalena Christian RN made her aware that patients IV infiltrated and needs to be removed and the daughter is requesting mouth care for her mother. 0  in room to discuss prognosis after discussion the dtr Lexie Cevallos agrees to move forward with comfort care at this time and hospice. Orders placed for comfort measures only, medications ordered prn, and transfer order placed. 3. Will continue to follow    4. Initial consult note routed to primary continuity provider and/or primary health care team members. Spoke with Magdalena Christian RN, [address] and  .   5.Communicated plan of care with: Palliative IDT, [address] Team     GOALS OF CARE / TREATMENT PREFERENCES:     GOALS OF CARE:  Patient/Health Care Proxy Stated Goals: Comfort    TREATMENT PREFERENCES:   Code Status: DNR    Patient's personal goals include: unable to discuss patient aphasic    Important upcoming milestones or family events: unable to discuss patient aphasic    The patient identifies the following as important for living well: unable to discuss patient aphasic      Advance Care Planning:  [x] The [facility] Interdisciplinary Team has updated the ACP Navigator with [address] and Patient Capacity      Primary Decision Maker (Active): Margarita Dies - Daughter - New Daisha 10/27/2021   Patient's [address] is: Named in scanned ACP document   Confirm Advance Directive Yes, on file   Does the patient have other document types -       Medical Interventions: Comfort measures       Other:    As far as possible, the palliative care team has discussed with patient / health care proxy about goals of care / treatment preferences for patient. HISTORY:     History obtained from: Chart    CHIEF COMPLAINT: Seizure    HPI/SUBJECTIVE:    The patient is: 79 yo  female with a past medical history of stroke with aphasia R sided weakness, HTN, hypothyroidism, HLD, breast CA with L mastectomy who presented to the ED as a transfer from [facility] after having a new onset of tonic-clonic seizure activity. She was sitting on her couch at home and the patient stopped talking with her eyes rolled back in her head and developed tonic-clonic seizure activity. The seizure reportedly lasted close to 20 minutes and the patient was given then dose of Versed by EMS and the seizure stopped. She was noted to have a low potassium and elevated lactic acid. Head CT done at [address] [place] [address] revealed no abnormalities.   She was started on Keppra at the outside hospital.   [] Verbal and participatory  [x] Non-participatory due to:   Aphasia    Clinical Pain Assessment (nonverbal scale for severity on nonverbal patients):   Clinical Pain Assessment  Severity: 4     Activity (Movement): Lying quietly, normal position    Duration: for how long has pt been experiencing pain (e.g., 2 days, 1 month, years)  Frequency: how often pain is an issue (e.g., several times per day, once every few days, constant)     FUNCTIONAL ASSESSMENT:     Palliative Performance Scale (PPS):  PPS: 10       PSYCHOSOCIAL/SPIRITUAL SCREENING:     Palliative IDT has assessed this patient for cultural preferences / practices and a referral made as appropriate to needs (Cultural Services, Patient Advocacy, Ethics, etc.)    Any spiritual / Advent concerns:  [] Yes /  [x] No    Caregiver Burnout:  [] Yes /  [x] No /  [] No Caregiver Present      Anticipatory grief assessment:   [x] Normal  / [] Maladaptive       ESAS Anxiety: Anxiety: 4    ESAS Depression: Depression: 0 REVIEW OF SYSTEMS:     Positive and pertinent negative findings in ROS are noted above in HPI. The following systems were [x] reviewed / [] unable to be reviewed as noted in HPI  Other findings are noted below. Systems: constitutional, ears/nose/mouth/throat, respiratory, gastrointestinal, genitourinary, musculoskeletal, integumentary, neurologic, psychiatric, endocrine. Positive findings noted below. Modified ESAS Completed by: provider   Fatigue: 3 Drowsiness: 2   Depression: 0 Pain: 4   Anxiety: 4 Nausea: 0   Anorexia: 4 Dyspnea: 0           Stool Occurrence(s): 1        PHYSICAL EXAM:     From RN flowsheet:  Wt Readings from Last 3 Encounters:   10/22/21 120 lb (54.4 kg)   09/13/21 118 lb 6.4 oz (53.7 kg)   03/26/21 130 lb (59 kg)     Blood pressure (!) 147/98, pulse 96, temperature 97.4 °F (36.3 °C), resp. rate 18, SpO2 98 %.     Pain Scale 1: FLACC  Pain Intensity 1: 0                   Constitutional: Not alert, resting in bed crying out  Eyes: PERRL, anicteric, not able perform accomodation exam  ENMT: no nasal discharge, dry mucous membranes  Cardiovascular: regular rhythm, distal pulses intact  Respiratory: breathing not labored, symmetric, Lungs CTA all lung fields  Gastrointestinal: soft non-tender, +bowel sounds  Musculoskeletal: no deformity, no tenderness to palpation  Skin: warm, dry  Neurologic: No response to verbal today, not following commands, incomprehensible words         HISTORY:     Active Problems:    Seizure ([address] (10/23/2021)      Frailty syndrome in geriatric patient (10/25/2021)      Aphasia (10/25/2021)      Goals of care, counseling/discussion ()      Frailty ()      Debility ()      Need for emotional support ()      Past Medical History:   Diagnosis Date    Breast cancer ([address]     Constipation     First degree AV block     GERD (gastroesophageal reflux disease)     HTN (hypertension)     Hypercalcemia 10/26/2015    Hyperparathyroidism ([address]     Hypertension     Hypopotassemia     IGT (impaired glucose tolerance)     [facility]urop[facility]     Osteoporosis     2008 Elbow Fx    Other and unspecified hyperlipidemia     Pneumonia 2006    RLL    Skin cancer of nose     Toxic multinodular goiter     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    On C-scope 2008       Past Surgical History:   Procedure Laterality Date    HX APPENDECTOMY      HX BREAST LUMPECTOMY      HX BREAST LUMPECTOMY Left 1998    HX BREAST LUMPECTOMY Right 1999    HX CATARACT REMOVAL Bilateral     HX HEENT      nose surgery after skin cancer removed    HX HEENT      YAG both eyes    HX HYSTERECTOMY  1980    HX LITHOTRIPSY  1979    HX MASTECTOMY Right 2002    HX MASTECTOMY Left     HX OTHER SURGICAL      had left lower rib removed to extract a kidney stone    HX TUBAL LIGATION        Family History   Problem Relation Age of Onset    Cancer Mother         lymphoma    Heart Disease Father     Heart Disease Maternal Grandmother     Diabetes Maternal Grandfather     Heart Disease Paternal Grandmother     Colon Cancer Maternal Aunt     Other Daughter         Overdose    Thyroid Disease Neg Hx       History reviewed, no pertinent family history.   Social History     Tobacco Use    Smoking status: Never Smoker    Smokeless tobacco: Never Used   Substance Use Topics    Alcohol use: No     Alcohol/week: 0.0 standard drinks     Comment: on the holidays may have a mixed     Allergies   Allergen Reactions    Latex Rash    Contrast Agent [Iodine] Unknown (comments) and Hives    Doxycycline Unknown (comments) and Hives    Erythromycin Unknown (comments) and Hives    Macrodantin [Nitrofurantoin Macrocrystalline] Unknown (comments)    Nitrofurantoin Hives    Sulfa (Sulfonamide Antibiotics) Hives     Eyes turned red    Sulfasalazine Hives     Eyes turned red    Tamoxifen Other (comments) and Rash      Current Facility-Administered Medications   Medication Dose Route Frequency    morphine (ROXANOL) concentrated oral syringe 5 mg  5 mg SubLINGual Q3H PRN    glycopyrrolate (ROBINUL) injection 0.1 mg  0.1 mg IntraMUSCular TID    scopolamine (TRANSDERM-SCOP) 1 mg over 3 days 1 Patch  1 Patch TransDERmal Q72H    LORazepam (INTENSOL) 2 mg/mL oral concentrate 2 mg  2 mg SubLINGual Q5MIN PRN    acetaminophen (TYLENOL) suppository 325 mg  325 mg Rectal Q4H PRN          LAB AND IMAGING FINDINGS:     Lab Results   Component Value Date/Time    WBC 14.0 (H) 10/27/2021 02:54 AM    HGB 12.2 10/27/2021 02:54 AM    PLATELET 113 18/58/8637 02:54 AM     Lab Results   Component Value Date/Time    Sodium 144 10/27/2021 02:54 AM    Potassium 3.3 (L) 10/27/2021 02:54 AM    Chloride 118 (H) 10/27/2021 02:54 AM    CO2 22 10/27/2021 02:54 AM    BUN 39 (H) 10/27/2021 02:54 AM    Creatinine 1.04 (H) 10/27/2021 02:54 AM    Calcium 11.8 (H) 10/27/2021 02:54 AM    Magnesium 2.1 10/27/2021 02:54 AM    Phosphorus 3.2 10/12/2020 01:08 PM      Lab Results   Component Value Date/Time    Alk. phosphatase 77 10/23/2021 06:26 AM    Protein, total 6.4 10/23/2021 06:26 AM    Albumin 2.6 (L) 10/23/2021 06:26 AM    Globulin 3.8 10/23/2021 06:26 AM     Lab Results   Component Value Date/Time    INR 1.0 07/23/2018 02:30 PM    Prothrombin time 9.9 07/23/2018 02:30 PM    aPTT 22.6 07/23/2018 02:30 PM      No results found for: IRON, FE, TIBC, IBCT, PSAT, FERR   No results found for: PH, PCO2, PO2  No components found for: [place]   Lab Results   Component Value Date/Time     (H) 10/23/2021 06:26 AM                Total time: 45  Counseling / coordination time, spent as noted above: 30  > 50% counseling / coordination?: yes     Prolonged service was provided for  []30 min   []75 min in face to face time in the presence of the patient, spent as noted above. Time Start:   Time End:   Note: this can only be billed with 99087 (initial) or 85070 (follow up). If multiple start / stop times, list each separately.

## 2021-10-27 NOTE — PROGRESS NOTES
Bedside shift change report given to Vi Anthony RN by Stuart Damico. Report included the following information SBAR, ED Summary, Intake/Output, MAR and Recent Results. 0830 Patient's daughter and son in law at bedside.

## 2021-10-27 NOTE — PROGRESS NOTES
Spiritual Care Assessment/Progress Note  [facility]      NAME: Leilani Valdovinos      MRN: [id number]  AGE: 80 y.o. SEX: female  Gnosticist Affiliation: Jainism   Language: English     10/27/2021     Total Time (in minutes): 6     Spiritual Assessment begun in [facility] through conversation with:         []Patient        [x] Family    [] Friend(s)        Reason for Consult: Palliative Care, Interdisciplinary Rounds     Spiritual beliefs: (Please include comment if needed)     [x] Identifies with a manjinder tradition:         [] Supported by a manjinder community:            [] Claims no spiritual orientation:           [] Seeking spiritual identity:                [] Adheres to an individual form of spirituality:           [] Not able to assess:                           Identified resources for coping:      [x] Prayer                               [] Music                  [] Guided Imagery     [x] Family/friends                 [] Pet visits     [] Devotional reading                         [] Unknown     [] Other:                                               Interventions offered during this visit: (See comments for more details)    Patient Interventions: Initial visit     Family/Friend(s): Initial Assessment     Plan of Care:     [x] Support spiritual and/or cultural needs    [] Support AMD and/or advance care planning process      [] Support grieving process   [] Coordinate Rites and/or Rituals    [] Coordination with community clergy   [] No spiritual needs identified at this time   [] Detailed Plan of Care below (See Comments)  [] Make referral to Music Therapy  [] Make referral to Pet Therapy     [] Make referral to Addiction services  [] Make referral to [facility]  [] Make referral to Spiritual Care Partner  [] No future visits requested        [x] Follow up upon further referrals     Comments: Provided support for this pt in [facility].   Pt's dtr and GENO were present during this visit. Pt was resting comfortably and never awakened. Reviewed pt's chart prior to this visit to augment any observed or expressed support needs this pt may have. Facilitated life review to assess potential support needs or coping strategies. Provided pastoral support as pt's family offered review of pt's current situation. According to family, pt has a terminal prognosis and family is having to decide on the next level of care that would be best for pt. Family will be traveling back and forth from [place] to provide support for this pt and requested that [facility] provide prayer when they visit with pt. Pt's family also shared, upon [facility] request, that pt loves Skip  and music from the 45s. Pt loves to dance so music is meaningful to her. Pt also likes the television show [employer] and the [employer] station. [address] [place] [address] will coordinate with Music Therapist to assess the efficacy of a session being offered for pt. Offered prayer for pt and pt's family. Assured pt's family of continued prayers and affirmed ongoing availability of support. Ana Paula Maria MDiv.  Staff   Request  Support/Spiritual Care Services via [facility]

## 2021-10-27 NOTE — PROGRESS NOTES
End of Shift Note    Bedside shift change report given to Vandana Pang (oncoming nurse) by Omar Galicia RN (offgoing nurse).   Report included the following information SBAR, Kardex, Intake/Output and MAR    Shift worked:  night   Shift summary and any significant changes:     uneventful night, bedrest, made comfortable in bed, safety precautions maintained        Concerns for physician to address:  none   Zone phone for oncoming shift:        Patient Information  Leanne Farmer  80 y.o.  10/23/2021  5:02 AM by Oren Coffey MD. Kraig Quarles was admitted from Home    Problem List  Patient Active Problem List    Diagnosis Date Noted    Goals of care, counseling/discussion     Frailty     Debility     Need for emotional support     Frailty syndrome in geriatric patient 10/25/2021    Aphasia 10/25/2021    Seizure ([address] 10/23/2021    Mixed hyperlipidemia 07/31/2020    History of breast cancer 03/12/2020    CKD (chronic kidney disease) stage 3, GFR 30-59 ml/min ([address] 03/12/2020    Stroke (cerebrum) ([address] 07/23/2018    S/P left mastectomy 01/06/2017    Toxic multinodular goiter 06/17/2016    Osteoporosis 02/22/2016    Hypercalcemia 10/26/2015    Encounter for long-term (current) drug use 10/21/2015    Constipation     Hypertension     GERD (gastroesophageal reflux disease)     Skin cancer of nose      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    On C-scope 2008        Core Measures:  CVA: Yes Yes    Activity:  Activity Level: Bed Rest  Number times ambulated in hallways past shift: 0  Number of times OOB to chair past shift: 0    Cardiac:   Cardiac Monitoring: Yes      Cardiac Rhythm: Sinus Rhythm    Access:   Current line(s): PIV     Genitourinary:   Urinary status: incontinent    Respiratory:   O2 Device: None (Room air)  Chronic home O2 use?: NO  Incentive spirometer at bedside: NO       GI:  Last Bowel Movement Date: 10/25/21  Current diet:  DIET NPO  Passing flatus: YES  Tolerating current diet: YES       Pain Management:   Patient states pain is manageable on current regimen: N/A    Skin:  Fernando Score: 11  Interventions: turn team, speciality bed, increase time out of bed and limit briefs    Patient Safety:  Fall Score:  Total Score: 3  Interventions: bed/chair alarm, assistive device (walker, cane, etc), gripper socks and pt to call before getting OOB  High Fall Risk: Yes  @Rollbelt  @dexterity to release roll belt  Yes/No ( must document dexterity  here by stating Yes or No here, otherwise this is a restraint and must follow restraint documentation policy.)    DVT prophylaxis:  DVT prophylaxis Med- yes  DVT prophylaxis SCD or GERRY- No     Wounds: (If Applicable)  Wounds- No  Location     Active Consults:  IP CONSULT TO NEUROLOGY  IP CONSULT TO PALLIATIVE CARE - PROVIDER    Length of Stay:  Expected LOS: 2d 21h  Actual LOS: 4  Discharge Plan: No       Steph Millard RN

## 2021-10-27 NOTE — PROGRESS NOTES
Neurology Note    Patient ID:  Trever Denise  [id number]  33 y.o.  8/2/1933      Date of Consultation:  October 27, 2021      Assessment and Plan:    The patient is a pleasant 42-year-old female with history of prior stroke with residual aphasia and right-sided weakness who presents with new onset of seizures which began with status epilepticus. Her examination does reveal aphasia and right-sided weakness. New onset seizures: The etiology of the seizures may be related to prior stroke with new superimposed stroke. EEG with no ongoing seizures  No further seizures   continue Keppra at 1500 mg twice a day. Acute stroke, with history of prior stroke:  Risk for stroke include hypertension, dyslipidemia, prior stroke. Continue Plavix  Carotid doppler study with no evidence on flow limiting stenosis of preliminary read. Hypertension: Aggressive control with goal systolic blood pressure under 140  Dyslipidemia: Continue lipid-lowering therapy. lipitor 40 mg  Updated hemoglobin A1c and lipid levels were at goal    I spoke at length with the patient's daughter at the bedside this afternoon. I discussed given the patient's history of prior stroke, dementia, and now new stroke that the patient is going to be left with significant neurological impairment. I would not expect the patient to be able to perform her activities of daily living independently. I answered all the questions that the patient's daughter had completely. She is looking to pursue hospice. There is no other additional neurology recommendations at this time. If questions arise, please not hesitate to contact me and I will return to see the patient.          Subjective: no verbal output this am       History of Present Illness:   Deloris A Adina Snellen is a 80 y.o. female with a history of hypothyroidism, hypertension, reflux disease and breast cancer who was transferred from [facility] after having a new onset of tonic-clonic seizure activity. She did have neuro imaging suggestive of an acute stroke.     There has been no improvement in her neurological function since admission  Past Medical History:   Diagnosis Date    Breast cancer ([facility]     Constipation     First degree AV block     GERD (gastroesophageal reflux disease)     HTN (hypertension)     Hypercalcemia 10/26/2015    Hyperparathyroidism ([facility]     Hypertension     Hypopotassemia     IGT (impaired glucose tolerance)     Neuropathy, diabetic ([facility]     Osteoporosis     2008 Elbow Fx    Other and unspecified hyperlipidemia     Pneumonia 2006    RLL    Skin cancer of nose     Toxic multinodular goiter     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    On C-scope 2008         Past Surgical History:   Procedure Laterality Date    HX APPENDECTOMY      HX BREAST LUMPECTOMY      HX BREAST LUMPECTOMY Left 1998    HX BREAST LUMPECTOMY Right 1999    HX CATARACT REMOVAL Bilateral     HX HEENT      nose surgery after skin cancer removed    HX HEENT      YAG both eyes    HX HYSTERECTOMY  1980    HX LITHOTRIPSY  1979    HX MASTECTOMY Right 2002    HX MASTECTOMY Left     HX OTHER SURGICAL      had left lower rib removed to extract a kidney stone    HX TUBAL LIGATION          Family History   Problem Relation Age of Onset    Cancer Mother         lymphoma    Heart Disease Father     Heart Disease Maternal Grandmother     Diabetes Maternal Grandfather     Heart Disease Paternal Grandmother     Colon Cancer Maternal Aunt     Other Daughter         Overdose    Thyroid Disease Neg Hx         Social History     Tobacco Use    Smoking status: Never Smoker    Smokeless tobacco: Never Used   Substance Use Topics    Alcohol use: No     Alcohol/week: 0.0 standard drinks     Comment: on the holidays may have a mixed        Allergies   Allergen Reactions    Latex Rash    Contrast Agent [Iodine] Unknown (comments) and Hives    Doxycycline Unknown (comments) and Hives    Erythromycin Unknown (comments) and Hives    Macrodantin [Nitrofurantoin Macrocrystalline] Unknown (comments)    Nitrofurantoin Hives    Sulfa (Sulfonamide Antibiotics) Hives     Eyes turned red    Sulfasalazine Hives     Eyes turned red    Tamoxifen Other (comments) and Rash          Current Facility-Administered Medications   Medication Dose Route Frequency    morphine (ROXANOL) concentrated oral syringe 5 mg  5 mg SubLINGual Q3H PRN    acetaminophen (TYLENOL) suppository 325 mg  325 mg Rectal Q4H PRN    sodium chloride (NS) flush 5-40 mL  5-40 mL IntraVENous Q8H    sodium chloride (NS) flush 5-40 mL  5-40 mL IntraVENous PRN    LORazepam (ATIVAN) injection 2 mg  2 mg IntraVENous Q5MIN PRN    heparin (porcine) injection 5,000 Units  5,000 Units SubCUTAneous Q12H    dextrose 5% - 0.9% NaCl with KCl 20 mEq/L infusion  50 mL/hr IntraVENous CONTINUOUS    clopidogreL (PLAVIX) tablet 75 mg  75 mg Oral DAILY    methIMAzole (TAPAZOLE) tablet 5 mg  5 mg Oral DAILY    atorvastatin (LIPITOR) tablet 40 mg  40 mg Oral QHS    levETIRAcetam (KEPPRA) 1,500 mg in 0.9% sodium chloride 100 mL IVPB  1,500 mg IntraVENous Q12H    hydrALAZINE (APRESOLINE) 20 mg/mL injection 10 mg  10 mg IntraVENous Q6H PRN    metoprolol (LOPRESSOR) injection 2.5 mg  2.5 mg IntraVENous Q6H         Review of Systems:    [x]Unable to obtain  ROS due to  [x]mental status change  []sedated   []intubated    Objective:     Visit Vitals  BP (!) 150/101 (BP 1 Location: Right upper arm, BP Patient Position: At rest)   Pulse 96   Temp 97.6 °F (36.4 °C)   Resp 18   SpO2 99%       Physical Exam:    General:   in no acute distress  Neck: no carotid bruits  Lungs: clear to auscultation  Heart:  no murmurs, regular rate  Lower extremity: peripheral pulses palpable and no edema  Skin: intact    Neurological exam:    Minimally arousable. She will not follow any commands.     Perrrla  Facial motor: Right facial droop    Motor:   No evidence of fasciculations    Strength testing:  Minimal spontaneous movement seen in the left upper and lower extremity. No movement on the right. Plantar response: Extensor on the right    Cerebellar testing:  no tremor apparent      Labs:     Lab Results   Component Value Date/Time    Hemoglobin A1c 5.3 10/24/2021 11:28 AM    Sodium 144 10/27/2021 02:54 AM    Potassium 3.3 (L) 10/27/2021 02:54 AM    Chloride 118 (H) 10/27/2021 02:54 AM    Glucose 143 (H) 10/27/2021 02:54 AM    BUN 39 (H) 10/27/2021 02:54 AM    Creatinine 1.04 (H) 10/27/2021 02:54 AM    Calcium 11.8 (H) 10/27/2021 02:54 AM    WBC 14.0 (H) 10/27/2021 02:54 AM    HCT 38.0 10/27/2021 02:54 AM    HGB 12.2 10/27/2021 02:54 AM    PLATELET 914 72/24/5455 02:54 AM       Imaging:    Results from Hospital Encounter encounter on 07/23/18    MRI BRAIN WO CONT    Narrative      EXAM:  MRI BRAIN WO CONT    INDICATION:    possible stroke    COMPARISON:  CT head 7/23/2018. CONTRAST: None. TECHNIQUE:  Multiplanar multisequence acquisition without contrast of the brain. FINDINGS:  Acute cortical/subcortical infarcts involving the left parieto-occipital lobe  and left posterior insula, and additional acute infarct in the deep white matter  of the left frontoparietal lobe. There is no significant mass effect. No  evidence of acute hemorrhage. Mild generalized parenchymal volume loss with commensurate dilation of the sulci  and ventricular system. Scattered periventricular and deep white matter T2/FLAIR  hyperintensities, consistent with mild chronic microvascular ischemic disease. Small chronic microhemorrhage in the right thalamus. There is no cerebellar  tonsillar herniation. Expected arterial flow-voids are present. The paranasal sinuses, mastoid air cells, and middle ears are clear. The orbital  contents are within normal limits. No significant osseous or scalp lesions are  identified. Impression  IMPRESSION:  1.  Acute infarcts in the left frontoparietal deep white matter, posterior  insula, and parieto-occipital lobe. No mass effect. No acute intracranial  hemorrhage. Results from [place] encounter on 10/22/21    CT HEAD WO CONT    Narrative  INDICATION: seizure    Exam: Noncontrast CT of the brain is performed with 5 mm collimation. CT dose reduction was achieved with the use of the standardized protocol  tailored for this examination and automatic exposure control for dose  modulation. Direct comparison is made to CT dated August 2018. FINDINGS: There is left parieto-occipital encephalomalacia, unchanged. There is  no evidence of acute territorial infarct. There is no acute intracranial  hemorrhage, mass, mass effect or herniation. Ventricular system is normal.  The  mastoid air cells are well pneumatized. The visualized paranasal sinuses are  normal.    Impression  Stable left parieto-occipital encephalomalacia. No acute  intracranial hemorrhage, mass or infarct. head CT from October 22, 2021. There was atrophy and significant left parietal occipital encephalomalacia. Creatinine 0.9, liver function normal , albumin 2.6    LDL 14  Hemoglobin A1c 5.3.     brain MRI from October 24, 2021. There is acute on chronic infarct in the left posterior superior temporal lobe. She did have chronic small vessel ischemic disease and atrophy. Active Problems:    Seizure ([address] (10/23/2021)      Frailty syndrome in geriatric patient (10/25/2021)      Aphasia (10/25/2021)      Goals of care, counseling/discussion ()      Frailty ()      Debility ()      Need for emotional support ()         I spent   30 minutes providing care to this  acutely ill inpatient with > 50% of the time counseling and assisting in the coordination of care of the patient on the patient's hospital floor/unit.              Signed By:  Emi Vega DO FAAN    October 27, 2021

## 2021-10-27 NOTE — PROGRESS NOTES
Participated in [address] IDT for this pt in 2082. [facility] will remain available to provide pastoral support through compassionate presence in the hope to gain rapport and assess any emotional or spiritual support needs that may arise for this pt and/or pt's family. Kenneth Eaton MDiv.  Staff   Request  Support/Spiritual Care Services via [address] [place] [address]

## 2021-10-27 NOTE — PROGRESS NOTES
Hospitalist Progress Note    NAME: Leanne Monroy   :  1933   MRN:  [id number]       Assessment / Plan:  New onset seizures  Acute CVA  Hx of stroke with residual right sided hemiparesis and aphasia  HTN, uncontrolled  HLD    -MRI brain showed acute on chronic infarct in left posterior/superior temporal lobe, chronic infarct of left occipital lobe  -Continue IV Keppra  -continue Plavix, atorvastatin  -Currently n.p.o.   continue antihypertensive medication      Fever, resolved  Procal negative  ? central fever      Estimated discharge date:   Barriers:    Surrogate decision-maker, daughter    Code status: DNR  Prophylaxis: Hep SQ  Recommended Disposition: TBD, likely hospice     Subjective:     Chief Complaint / Reason for Physician Visit  Aphasic. Discussed with RN events overnight. Review of Systems:  Symptom Y/N Comments  Symptom Y/N Comments   Fever/Chills    Chest Pain     Poor Appetite    Edema     Cough    Abdominal Pain     Sputum    Joint Pain     SOB/MCKEON    Pruritis/Rash     Nausea/vomit    Tolerating PT/OT     Diarrhea    Tolerating Diet     Constipation    Other       Could NOT obtain due to:      Objective:     VITALS:   Last 24hrs VS reviewed since prior progress note. Most recent are:  Patient Vitals for the past 24 hrs:   Temp Pulse Resp BP SpO2   10/27/21 1554 97.4 °F (36.3 °C)  18 (!) 147/98 98 %   10/27/21 0818 97.6 °F (36.4 °C) 96 18 (!) 150/101 99 %   10/27/21 0624 97.4 °F (36.3 °C) 99 18 (!) 195/104 95 %   10/26/21 2307 97.4 °F (36.3 °C) 100 18 (!) 170/90 95 %   10/26/21 2038 97.4 °F (36.3 °C) 99 18 (!) 178/96 95 %   10/26/21 1759    (!) 163/76    10/26/21 1613 98.7 °F (37.1 °C) (!) 112 16 (!) 194/97 93 %     No intake or output data in the 24 hours ending 10/27/21 1559     I had a face to face encounter and independently examined this patient on 10/27/2021, as outlined below:  PHYSICAL EXAM:  General: Awake   EENT:  EOMI. Anicteric sclerae.  MMM  Resp:  CTA bilaterally, no wheezing or rales. No accessory muscle use  CV:  Regular  rhythm,  No edema  GI:  Soft, Non distended, Non tender. +Bowel sounds  Neurologic:  Aphasic, does not follow commands, right sided hemiparesis  Psych:   Unable to assess  Skin:  No rashes. No jaundice    Reviewed most current lab test results and cultures  YES  Reviewed most current radiology test results   YES  Review and summation of old records today    NO  Reviewed patient's current orders and MAR    YES  PMH/SH reviewed - no change compared to H&P  ________________________________________________________________________  Care Plan discussed with:    Comments   Patient x    Family      RN x    Care Manager     Consultant  x                      Multidiciplinary team rounds were held today with , nursing, pharmacist and clinical coordinator. Patient's plan of care was discussed; medications were reviewed and discharge planning was addressed. ________________________________________________________________________  Total NON critical care TIME:  42   Minutes    Total CRITICAL CARE TIME Spent:   Minutes non procedure based      Comments   >50% of visit spent in counseling and coordination of care     ________________________________________________________________________  Kathy Pimentel MD     Procedures: see electronic medical records for all procedures/Xrays and details which were not copied into this note but were reviewed prior to creation of Plan. LABS:  I reviewed today's most current labs and imaging studies.   Pertinent labs include:  Recent Labs     10/27/21  0254 10/25/21  0002   WBC 14.0* 11.0   HGB 12.2 11.1*   HCT 38.0 33.4*    232     Recent Labs     10/27/21  0254 10/25/21  0002    139   K 3.3* 3.3*   * 110*   CO2 22 22   * 123*   BUN 39* 21*   CREA 1.04* 1.01   CA 11.8* 10.7*   MG 2.1 1.7       Signed: Kathy Pimentel MD

## 2021-10-28 PROBLEM — Z51.5 ENCOUNTER FOR END OF LIFE CARE: Status: ACTIVE | Noted: 2021-01-01

## 2021-10-28 NOTE — ROUTINE PROCESS
TRANSFER - OUT REPORT:    Verbal report given on Leanne Vega Frederick  being transferred to Oncology (unit) room [phone] for change in patient condition(Comfort Measures Only)       Report consisted of patients Situation, Background, Assessment and   Recommendations(SBAR). Information from the following report(s) SBAR, ED Summary and MAR was reviewed with the receiving nurse. Opportunity for questions and clarification was provided.

## 2021-10-28 NOTE — PROGRESS NOTES
Palliative Medicine Consult  [place]: [phone])    Patient Name: Trey Medeiros  YOB: 1933    Called to bedside to pronounce patients death. On exam, no heart sounds or breath sounds were noted after one minute of auscultation. Pupils were fixed and dilated without pupillary light reflex. Patient was pronounced dead on 10/28/21 at 1403.

## 2021-10-28 NOTE — PROGRESS NOTES
Transition of Care Plan:    RUR: 13%Low   Disposition: Possible Hospice-eval pending   Follow up appointments: Follow up with recommendation   DME needed:  TBD pending eval   Transportation at Discharge: BLS transport to be arrange-pending d/c  Keys or means to access home:  Family to provide       IM Medicare Letter: 2nd  Medicare Letter   Is patient a BCPI-A Bundle:    N/A    If yes, was Bundle Letter given?:     Caregiver Contact: Juan Thibodeaux (daughter) [phone]  Discharge Caregiver contacted prior to discharge? Family to be contacted       CM: Verenice Carranza is currently working with pt in the [place] unit. CM acknowledged consult. CM sent referral to [facility] to complete info session with family to determine pt's goals of care. CM will arrange additional services as requested, upon completion of hospice info session.     QAMAR Tubbs, [address] [place] [address]

## 2021-10-28 NOTE — HOSPICE
[facility]  [phone]     Patient Name: Mirella Hanson  YOB: 1933  Age: 80 y.o. [address] RN Note:  Hospice consult received, reviewing chart. Will follow up with Unit Nurse and Care Manager to discuss plan of care, patient status and discharge disposition within the hour. Thank you for the opportunity to be of service to this patient.     Jose Eduardo Donnelly RN  Clinical Nurse Liaison   [address]  D)[phone] (k) [phone]

## 2021-10-28 NOTE — PROGRESS NOTES
Palliative Medicine Consult  Vamsi: [phone] ([phone])    Patient Name: Kraig Quarles  YOB: 1933    Date of Initial Consult: 10/25/21  Reason for Consult: Care Decisions  Requesting Provider: Tea Henry MD  Primary Care Physician: Aida Ward MD     SUMMARY:   Kraig Quarles is a 80 y.o. with a past history of stroke w/ aphasia R sided weakness, htn, hypothyroidism, HLD, breast CA w/ L mastectomy who was admitted on 10/23/2021 from home with a diagnosis of tonic clonic seizure. Current medical issues leading to Palliative Medicine involvement include: care decisions, and goals of care. 10/26/21: Resting in bed, sounds like she is whining but is incomprehensible with words. Keppra IV infusing. 10/27/21: Resting in bed crying and moaning intermittently. Patient moved to a new room yesterday afternoon and made private due to situation with albina Wilson. 10/28: pt lying in bed moaning, tachycardic (140s). Pt transitioned to [facility] yesterday. Social:Lives with albina Wilson in [place]. PALLIATIVE DIAGNOSES:   1. Goals Of Care  2. Fraility  3. Debility  4. Aphasia  5. Emotional Support to family and patient  6. End of life care  7. Pain control   PLAN:   1. Prior to visit, I completed a  review of patient's medical records, including medical documentation, vital signs, MARs, and results of various labs and other diagnostics. was called this am by FERCHO Clayton; despite oral morphine and ativan pt is moaning, appears to be in pain, RN requests IV medications for better symptom control. Also discussed with Hospice team, who will assess pt for GIP this am.   1. D/c SL morphine and ativan. 2. RN able to place IV easily. 3. Morphine 2mg IV q. 1 hour prn pain. 4. Ativan 0.5mg q. 1 hour prn anxiety, agitation. 5. Ativan 2mg IV q. 5 min prn seizures. 2. Spoke with dtr, discussed pt's elevated HR might be 2/2 pain, will see if new orders help.   Also let her know to expect a call from Hospice team today. 3. Initial consult note routed to primary continuity provider and/or primary health care team members. Spoke with . 4.Communicated plan of care with: Palliative Karli STANLEY [facility] Team     GOALS OF CARE / TREATMENT PREFERENCES:     GOALS OF CARE:  Patient/Health Care Proxy Stated Goals: Comfort    TREATMENT PREFERENCES:   Code Status: DNR    Patient's personal goals include: unable to discuss patient aphasic    Important upcoming milestones or family events: unable to discuss patient aphasic    The patient identifies the following as important for living well: unable to discuss patient aphasic      Advance Care Planning:  [x] The [facility] Interdisciplinary Team has updated the ACP Navigator with Health Care Decision Maker and Patient Capacity      Primary Decision Maker (Active): Crystal Trejo - Daughter - New Daisha 10/27/2021   Patient's [address] is: Named in scanned ACP document   Confirm Advance Directive Yes, on file   Does the patient have other document types -       Medical Interventions: Comfort measures       Other:    As far as possible, the palliative care team has discussed with patient / health care proxy about goals of care / treatment preferences for patient. HISTORY:     History obtained from: Chart    CHIEF COMPLAINT: Seizure    HPI/SUBJECTIVE:    The patient is: 81 yo  female with a past medical history of stroke with aphasia R sided weakness, HTN, hypothyroidism, HLD, breast CA with L mastectomy who presented to the ED as a transfer from [facility] after having a new onset of tonic-clonic seizure activity. She was sitting on her couch at home and the patient stopped talking with her eyes rolled back in her head and developed tonic-clonic seizure activity.   The seizure reportedly lasted close to 20 minutes and the patient was given then dose of Versed by EMS and the seizure stopped. She was noted to have a low potassium and elevated lactic acid. Head CT done at [facility] revealed no abnormalities. She was started on Keppra at the outside hospital.   [] Verbal and participatory  [x] Non-participatory due to:   Aphasia    Clinical Pain Assessment (nonverbal scale for severity on nonverbal patients):   Clinical Pain Assessment  Severity: 7  Location: pt moaning, delirious, unable to communicate  Character: pt moaning, delirious, unable to communicate  Duration: pt moaning, delirious, unable to communicate  Effect: pt moaning, delirious, unable to communicate  Factors: pt moaning, delirious, unable to communicate  Frequency: pt moaning, delirious, unable to communicate     Activity (Movement): Restless, excessive activity and/or withdrawal reflexes    Duration: for how long has pt been experiencing pain (e.g., 2 days, 1 month, years)  Frequency: how often pain is an issue (e.g., several times per day, once every few days, constant)     FUNCTIONAL ASSESSMENT:     Palliative Performance Scale (PPS):  PPS: 10       PSYCHOSOCIAL/SPIRITUAL SCREENING:     Palliative IDT has assessed this patient for cultural preferences / practices and a referral made as appropriate to needs (Cultural Services, Patient Advocacy, Ethics, etc.)    Any spiritual / Evangelical concerns:  [] Yes /  [x] No    Caregiver Burnout:  [] Yes /  [x] No /  [] No Caregiver Present      Anticipatory grief assessment:   [x] Normal  / [] Maladaptive       ESAS Anxiety: Anxiety: 5    ESAS Depression: Depression: 0        REVIEW OF SYSTEMS:     Positive and pertinent negative findings in ROS are noted above in HPI. The following systems were [x] reviewed / [] unable to be reviewed as noted in HPI  Other findings are noted below. Systems: constitutional, ears/nose/mouth/throat, respiratory, gastrointestinal, genitourinary, musculoskeletal, integumentary, neurologic, psychiatric, endocrine.  Positive findings noted below.  Modified ESAS Completed by: provider   Fatigue: 3 Drowsiness: 2   Depression: 0 Pain: 7   Anxiety: 5 Nausea: 0   Anorexia: 4 Dyspnea: 0           Stool Occurrence(s): 1        PHYSICAL EXAM:     From RN flowsheet:  Wt Readings from Last 3 Encounters:   10/22/21 120 lb (54.4 kg)   09/13/21 118 lb 6.4 oz (53.7 kg)   03/26/21 130 lb (59 kg)     Blood pressure (!) 161/112, pulse (!) 146, temperature 97.7 °F (36.5 °C), resp. rate 22, SpO2 94 %.     Pain Scale 1: Adult Nonverbal Pain Scale  Pain Intensity 1: 0                   Constitutional: moaning, not following commands  Eyes: PERRL, anicteric, not able perform accomodation exam  ENMT: no nasal discharge, dry mucous membranes  Cardiovascular: tachy, regular rhythm, distal pulses intact  Respiratory: breathing not labored, symmetric, Lungs CTA all lung fields  Gastrointestinal: soft non-tender, +bowel sounds  Musculoskeletal: no deformity, no tenderness to palpation  Skin: warm, dry  Neurologic: No response to verbal today, not following commands          HISTORY:     Active Problems:    Seizure ([address] (10/23/2021)      Frailty syndrome in geriatric patient (10/25/2021)      Aphasia (10/25/2021)      Goals of care, counseling/discussion ()      Frailty ()      Debility ()      Need for emotional support ()      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    On C-scope 2008       Past Surgical History:   Procedure Laterality Date    HX APPENDECTOMY      HX BREAST LUMPECTOMY      HX BREAST LUMPECTOMY Left 1998    HX BREAST LUMPECTOMY Right 1999    HX CATARACT REMOVAL Bilateral     HX HEENT      nose surgery after skin cancer removed    HX HEENT      YAG both eyes    HX HYSTERECTOMY  1980    HX LITHOTRIPSY  1979    HX MASTECTOMY Right 2002    HX MASTECTOMY Left     HX OTHER SURGICAL      had left lower rib removed to extract a kidney stone    HX TUBAL LIGATION        Family History   Problem Relation Age of Onset    Cancer Mother         lymphoma    Heart Disease Father     Heart Disease Maternal Grandmother     Diabetes Maternal Grandfather     Heart Disease Paternal Grandmother     Colon Cancer Maternal Aunt     Other Daughter         Overdose    Thyroid Disease Neg Hx       History reviewed, no pertinent family history.   Social History     Tobacco Use    Smoking status: Never Smoker    Smokeless tobacco: Never Used   Substance Use Topics    Alcohol use: No     Alcohol/week: 0.0 standard drinks     Comment: on the holidays may have a mixed     Allergies   Allergen Reactions    Latex Rash    Contrast Agent [Iodine] Unknown (comments) and Hives    Doxycycline Unknown (comments) and Hives    Erythromycin Unknown (comments) and Hives    Macrodantin [Nitrofurantoin Macrocrystalline] Unknown (comments)    Nitrofurantoin Hives    Sulfa (Sulfonamide Antibiotics) Hives     Eyes turned red    Sulfasalazine Hives     Eyes turned red    Tamoxifen Other (comments) and Rash      Current Facility-Administered Medications   Medication Dose Route Frequency    morphine injection 2 mg  2 mg IntraVENous Q1H PRN    LORazepam (ATIVAN) injection 0.5 mg  0.5 mg IntraVENous Q1H PRN    LORazepam (ATIVAN) injection 2 mg  2 mg IntraVENous Q5MIN PRN    glycopyrrolate (ROBINUL) injection 0.1 mg  0.1 mg IntraMUSCular TID    scopolamine (TRANSDERM-SCOP) 1 mg over 3 days 1 Patch  1 Patch TransDERmal Q72H    acetaminophen (TYLENOL) suppository 325 mg  325 mg Rectal Q4H PRN          LAB AND IMAGING FINDINGS:     Lab Results   Component Value Date/Time    WBC 14.0 (H) 10/27/2021 02:54 AM    HGB 12.2 10/27/2021 02:54 AM    PLATELET 688 27/51/5584 02:54 AM     Lab Results   Component Value Date/Time    Sodium 144 10/27/2021 02:54 AM    Potassium 3.3 (L) 10/27/2021 02:54 AM    Chloride 118 (H) 10/27/2021 02:54 AM    CO2 22 10/27/2021 02:54 AM    BUN 39 (H) 10/27/2021 02:54 AM    Creatinine 1.04 (H) 10/27/2021 02:54 AM    Calcium 11.8 (H) 10/27/2021 02:54 AM    Magnesium 2.1 10/27/2021 02:54 AM    Phosphorus 3.2 10/12/2020 01:08 PM      Lab Results   Component Value Date/Time    Alk. phosphatase 77 10/23/2021 06:26 AM    Protein, total 6.4 10/23/2021 06:26 AM    Albumin 2.6 (L) 10/23/2021 06:26 AM    Globulin 3.8 10/23/2021 06:26 AM     Lab Results   Component Value Date/Time    INR 1.0 07/23/2018 02:30 PM    Prothrombin time 9.9 07/23/2018 02:30 PM    aPTT 22.6 07/23/2018 02:30 PM      No results found for: IRON, FE, TIBC, IBCT, PSAT, FERR   No results found for: PH, PCO2, PO2  No components found for: [place]   Lab Results   Component Value Date/Time     (H) 10/23/2021 06:26 AM                Total time: 40  Counseling / coordination time, spent as noted above: 35  > 50% counseling / coordination?: yes     Prolonged service was provided for  []30 min   []75 min in face to face time in the presence of the patient, spent as noted above. Time Start:   Time End:   Note: this can only be billed with 89873 (initial) or 46507 (follow up). If multiple start / stop times, list each separately.

## 2021-10-28 NOTE — PROGRESS NOTES
Responded to page for this pt's death in [address]. No family or friends present during this visit. Contact spiritual care services for any spiritual or emotional support needs. Azael Lutz MDiv.  Staff   Request  Support/Spiritual Care Services via [address] [place] [address]

## 2021-10-28 NOTE — PROGRESS NOTES
End of Shift Note    Bedside shift change report given to Adrianne (oncoming nurse) by Alberto Nicholas (offgoing nurse). Report included the following information SBAR, Kardex and MAR    Shift worked:  7p-7a     Shift summary and any significant changes:     Scheduled medications have been given, see MAR. Patient was given doses of Morphine for pain and one dose of Ativan for restlessness. Patient was suctioned multiple times during my shift. Patient does not have any IV access. Patient has been repositioned during my shift. Frequent rounding has been done. Concerns for physician to address:       Zone phone for oncoming shift:          Activity:  Activity Level: Bed Rest  Number times ambulated in hallways past shift: 0  Number of times OOB to chair past shift: 0    Cardiac:   Cardiac Monitoring: No      Cardiac Rhythm: Sinus Rhythm    Access:   Current line(s): no access     Genitourinary:   Urinary status: incontinent    Respiratory:   O2 Device: None (Room air)  Chronic home O2 use?: NO  Incentive spirometer at bedside: NO     GI:  Last Bowel Movement Date: 10/25/21  Current diet:  DIET NPO  Passing flatus: YES  Tolerating current diet: YES       Pain Management:   Patient states pain is manageable on current regimen: YES    Skin:  Fernando Score: 14  Interventions: float heels    Patient Safety:  Fall Score:  Total Score: 2  Interventions: bed/chair alarm  High Fall Risk: Yes    Length of Stay:  Expected LOS: 2d 21h  Actual LOS: [facility]

## 2021-10-28 NOTE — DISCHARGE SUMMARY
Hospitalist Discharge Summary     Patient ID:  Marco Waller  [id number]  78 y.o.  8/2/1933  10/23/2021    PCP on record: Harshad Moreland MD    Admit date: 10/23/2021  Discharge date and time: 10/28/2021    DISCHARGE DIAGNOSIS:    See below    CONSULTATIONS:  IP CONSULT TO NEUROLOGY  IP CONSULT TO PALLIATIVE CARE - PROVIDER    Excerpted HPI from H&P of Eloina Alcantara MD:      ______________________________________________________________________  DISCHARGE SUMMARY/HOSPITAL COURSE:  for full details see H&P, daily progress notes, labs, consult notes. Ms olvera 79-year-old female with a past medical history of breast cancer s/p left mastectomy, hypertension, HLD, hyperparathyroidism, toxic multinodular goiter, hypertension, HLD, dementia, CVA in 2018 with right-sided residual improvement who presented with the ED with complaint of altered mental status. She was seen at outside ED, for seizures, generalized tonic-clonic, received IV Versed. Case was discussed with Dr. Ifeanyi Stern, and eventually she was transferred at our hospital.  She had another seizure episode after arrival.  She was loaded with Keppra. Seizure were controlled. For further work-up for for seizure, she had MRI done, which showed acute on chronic infarct in left posterior/superior temporal lobe. She was seen by speech therapy, she remained n.p.o. due to her poor mental status. Remained inappropriate for further PT/OT evaluation. Palliative was consulted, appreciate their help, they have discussed with the daughter who is medical power of , who has traveled down from [place]. Neurologist has discussed with her, due to patient's underlying conditions, dementia and a new CVA, patient not a candidate for a PEG tube. Family had decided regarding comfort care, she was made comfort care on 10/27.   She was pronounced dead on 10/28    New onset seizures  Acute CVA  Hx of stroke with residual right sided hemiparesis and aphasia  HTN,   HLD       _______________________________________________________________________  Laura Ales to examine patient today, as patient passed away before I can see her  _______________________________________________________________________  DISCHARGE MEDICATIONS:   Current Discharge Medication List              __________________________________________________________  ___________________________________________________________________      Total time in minutes spent coordinating this discharge (includes going over instructions, follow-up, prescriptions, and preparing report for sign off to her PCP) :  >30 minutes    Signed:  Ruthie Kirkpatrick MD

## 2021-10-28 NOTE — PROGRESS NOTES
At 1320 I found patient pulseless  and not breathing. I notified charge nurse and he notified the physician.

## 2021-10-28 NOTE — HOSPICE
This LMSW and Hospice Liaison Henny Lam RN left voicemails for pt's daughter/MPOA Thu Delvalle to provide hospice information. Per Vivian Lozada RN, pt meets GIP level of care. Will call daughter again to make contact.       Dom Ballard, [address] [place] [address]    [phone]

## 2021-10-29 NOTE — PROGRESS NOTES
RAPID RESPONSE TEAM     Accessed chart to review elevate MEWS score;  Maninder Diamond MD documented as attending physician. Patient was hospice.      Marcie Maldonado RN  Ext. [phone]

## 2025-06-11 NOTE — TELEPHONE ENCOUNTER
----- Message from Tracie Fermín sent at 5/7/2018  1:35 PM EDT -----  Regarding: Dr. Qiana Angeles  The patient is letting the doctor know that her bp reading on 4/21/18 181/110 pulse 77, 4/24/18 182/108 pulse 72, 5/2/18 166/86 pulse 74, 5/2/18 later in the day 130/82. The patient was also told to change the instructions of Rx Dil Tiazem XR 120mg capsules to two in the morning and one Rx Zebeta in the evening. The patient stated that she did feel sleepy and dozed off a couple of times. The patient is also requesting that a refill for Dil Tiazem XR 120mg is called into the pharmacy.  ([facility])[phone] (k)[phone] 